# Patient Record
Sex: MALE | Race: WHITE | NOT HISPANIC OR LATINO | Employment: FULL TIME | URBAN - METROPOLITAN AREA
[De-identification: names, ages, dates, MRNs, and addresses within clinical notes are randomized per-mention and may not be internally consistent; named-entity substitution may affect disease eponyms.]

---

## 2017-03-07 ENCOUNTER — OFFICE VISIT (OUTPATIENT)
Dept: FAMILY MEDICINE | Facility: CLINIC | Age: 42
End: 2017-03-07
Payer: COMMERCIAL

## 2017-03-07 VITALS
HEIGHT: 73 IN | BODY MASS INDEX: 25.74 KG/M2 | TEMPERATURE: 98.4 F | WEIGHT: 194.25 LBS | DIASTOLIC BLOOD PRESSURE: 64 MMHG | OXYGEN SATURATION: 98 % | HEART RATE: 62 BPM | SYSTOLIC BLOOD PRESSURE: 116 MMHG

## 2017-03-07 DIAGNOSIS — G47.00 INSOMNIA, UNSPECIFIED: ICD-10-CM

## 2017-03-07 DIAGNOSIS — F10.21 ALCOHOL DEPENDENCE IN REMISSION (H): Primary | ICD-10-CM

## 2017-03-07 PROCEDURE — 36415 COLL VENOUS BLD VENIPUNCTURE: CPT | Performed by: FAMILY MEDICINE

## 2017-03-07 PROCEDURE — 80076 HEPATIC FUNCTION PANEL: CPT | Performed by: FAMILY MEDICINE

## 2017-03-07 PROCEDURE — 99214 OFFICE O/P EST MOD 30 MIN: CPT | Performed by: FAMILY MEDICINE

## 2017-03-07 RX ORDER — DISULFIRAM 250 MG/1
250 TABLET ORAL DAILY
Qty: 90 TABLET | Refills: 1 | Status: SHIPPED | OUTPATIENT
Start: 2017-03-07 | End: 2017-05-25

## 2017-03-07 RX ORDER — TEMAZEPAM 7.5 MG/1
7.5 CAPSULE ORAL
Qty: 30 CAPSULE | Refills: 2 | Status: SHIPPED | OUTPATIENT
Start: 2017-03-07 | End: 2021-07-12

## 2017-03-07 RX ORDER — TEMAZEPAM 15 MG/1
15 CAPSULE ORAL
Qty: 30 CAPSULE | Refills: 3 | Status: CANCELLED | OUTPATIENT
Start: 2017-03-07

## 2017-03-07 ASSESSMENT — ANXIETY QUESTIONNAIRES
6. BECOMING EASILY ANNOYED OR IRRITABLE: MORE THAN HALF THE DAYS
7. FEELING AFRAID AS IF SOMETHING AWFUL MIGHT HAPPEN: SEVERAL DAYS
5. BEING SO RESTLESS THAT IT IS HARD TO SIT STILL: NOT AT ALL
GAD7 TOTAL SCORE: 6
1. FEELING NERVOUS, ANXIOUS, OR ON EDGE: MORE THAN HALF THE DAYS
IF YOU CHECKED OFF ANY PROBLEMS ON THIS QUESTIONNAIRE, HOW DIFFICULT HAVE THESE PROBLEMS MADE IT FOR YOU TO DO YOUR WORK, TAKE CARE OF THINGS AT HOME, OR GET ALONG WITH OTHER PEOPLE: NOT DIFFICULT AT ALL
2. NOT BEING ABLE TO STOP OR CONTROL WORRYING: NOT AT ALL
3. WORRYING TOO MUCH ABOUT DIFFERENT THINGS: SEVERAL DAYS

## 2017-03-07 ASSESSMENT — PATIENT HEALTH QUESTIONNAIRE - PHQ9: 5. POOR APPETITE OR OVEREATING: NOT AT ALL

## 2017-03-07 NOTE — NURSING NOTE
"No chief complaint on file.      Initial /64 (Cuff Size: Adult Large)  Pulse 62  Temp 98.4  F (36.9  C) (Oral)  Ht 6' 1\" (1.854 m)  Wt 194 lb 4 oz (88.1 kg)  SpO2 98%  BMI 25.63 kg/m2 Estimated body mass index is 25.63 kg/(m^2) as calculated from the following:    Height as of this encounter: 6' 1\" (1.854 m).    Weight as of this encounter: 194 lb 4 oz (88.1 kg).  Medication Reconciliation: complete     Niharika Barry, CMA      "

## 2017-03-07 NOTE — MR AVS SNAPSHOT
After Visit Summary   3/7/2017    Jaun Lobo    MRN: [id number]           Patient Information     Date Of Birth          1975        Visit Information        Provider Department      3/7/2017 3:00 PM Parker Bush MD [facility]        Today's Diagnoses     Alcohol dependence in remission (HCC)    -  1    Insomnia, unspecified           Follow-ups after your visit        Additional Services     MENTAL HEALTH REFERRAL       Your provider has referred you to: [place] chemical dependency counseling [phone]     All scheduling is subject to the client's specific insurance plan & benefits, provider/location availability, and provider clinical specialities.  Please arrive 15 minutes early for your first appointment and bring your completed paperwork.    Please be aware that coverage of these services is subject to the terms and limitations of your health insurance plan.  Call member services at your health plan with any benefit or coverage questions.                  Who to contact     If you have questions or need follow up information about today's clinic visit or your schedule please contact [facility] directly at [phone].  Normal or non-critical lab and imaging results will be communicated to you by MyChart, letter or phone within 4 business days after the clinic has received the results. If you do not hear from us within 7 days, please contact the clinic through MyChart or phone. If you have a critical or abnormal lab result, we will notify you by phone as soon as possible.  Submit refill requests through [employer] or call your pharmacy and they will forward the refill request to us. Please allow 3 business days for your refill to be completed.          Additional Information About Your Visit        c-LEctahart Information     [employer] lets you send messages to your doctor, view your test results, renew your prescriptions, schedule appointments and  "more. To sign up, go to [email or web address] . Click on \"Log in\" on the left side of the screen, which will take you to the Welcome page. Then click on \"Sign up Now\" on the right side of the page.     You will be asked to enter the access code listed below, as well as some personal information. Please follow the directions to create your username and password.     Your access code is: [email or web address]  Expires: 2017  3:36 PM     Your access code will  in 90 days. If you need help or a new code, please call your [place] clinic or [phone].        Care EveryWhere ID     This is your Care EveryWhere ID. This could be used by other organizations to access your [place] medical records  [id number]        Your Vitals Were     Pulse Temperature Height Pulse Oximetry BMI (Body Mass Index)       62 98.4  F (36.9  C) (Oral) 6' 1\" (1.854 m) 98% 25.63 kg/m2        Blood Pressure from Last 3 Encounters:   17 116/64   16 124/70   07/28/15 106/70    Weight from Last 3 Encounters:   17 194 lb 4 oz (88.1 kg)   16 182 lb (82.6 kg)   07/28/15 185 lb (83.9 kg)              We Performed the Following     Hepatic panel (Albumin, ALT, AST, Bili, Alk Phos, TP)     MENTAL HEALTH REFERRAL          Today's Medication Changes          These changes are accurate as of: 3/7/17  3:37 PM.  If you have any questions, ask your nurse or doctor.               Start taking these medicines.        Dose/Directions    disulfiram 250 MG tablet   Commonly known as:  ANTABUSE   Used for:  Alcohol dependence in remission (H)   Started by:  Parker Bush MD        Dose:  250 mg   Take 1 tablet (250 mg) by mouth daily   Quantity:  90 tablet   Refills:  1         These medicines have changed or have updated prescriptions.        Dose/Directions    * temazepam 15 MG capsule   Commonly known as:  RESTORIL   This may have changed:  Another medication with the same name was added. Make sure you understand how and " when to take each.   Used for:  Insomnia, unspecified   Changed by:  Parker Bush MD        Dose:  15 mg   Take 1 capsule (15 mg) by mouth nightly as needed for sleep   Quantity:  30 capsule   Refills:  2       * temazepam 7.5 MG capsule   Commonly known as:  RESTORIL   This may have changed:  You were already taking a medication with the same name, and this prescription was added. Make sure you understand how and when to take each.   Used for:  Insomnia, unspecified   Changed by:  Parker Bush MD        Dose:  7.5 mg   Take 1 capsule (7.5 mg) by mouth nightly as needed for sleep   Quantity:  30 capsule   Refills:  2       * Notice:  This list has 2 medication(s) that are the same as other medications prescribed for you. Read the directions carefully, and ask your doctor or other care provider to review them with you.         Where to get your medicines      These medications were sent to [facility] [place] [address]     Phone:  [phone]     disulfiram 250 MG tablet         Some of these will need a paper prescription and others can be bought over the counter.  Ask your nurse if you have questions.     Bring a paper prescription for each of these medications     temazepam 7.5 MG capsule                Primary Care Provider Office Phone # Fax #    Parker Bush [phone]       [place] [phone]        Thank you!     Thank you for choosing [facility]  for your care. Our goal is always to provide you with excellent care. Hearing back from our patients is one way we can continue to improve our services. Please take a few minutes to complete the written survey that you may receive in the mail after your visit with us. Thank you!             Your Updated Medication List - Protect others around you: Learn how to safely use, store  and throw away your medicines at www.disposemymeds.org.          This list is accurate as of: 3/7/17  3:37 PM.  Always use your most recent med list.                   Brand Name Dispense Instructions for use    albuterol 108 (90 BASE) MCG/ACT Inhaler    PROAIR HFA/PROVENTIL HFA/VENTOLIN HFA     Inhale 2 puffs into the lungs every 4 hours as needed for shortness of breath / dyspnea or wheezing       disulfiram 250 MG tablet    ANTABUSE    90 tablet    Take 1 tablet (250 mg) by mouth daily       melatonin 3 MG tablet      Take 3 mg by mouth nightly as needed.       * NALTREXONE HCL PO      Take 50 mg by mouth Reported on 3/7/2017       * naltrexone 50 MG tablet    DEPADE;REVIA    90 tablet    Take 1 tablet (50 mg) by mouth daily       St Johns Wort 450 MG Caps     60    once per day       * temazepam 15 MG capsule    RESTORIL    30 capsule    Take 1 capsule (15 mg) by mouth nightly as needed for sleep       * temazepam 7.5 MG capsule    RESTORIL    30 capsule    Take 1 capsule (7.5 mg) by mouth nightly as needed for sleep       * Notice:  This list has 4 medication(s) that are the same as other medications prescribed for you. Read the directions carefully, and ask your doctor or other care provider to review them with you.

## 2017-03-07 NOTE — PROGRESS NOTES
SUBJECTIVE:                                                    Jaun Lobo is a 42 year old male who presents to clinic today for the following health issues:    Medication Followup of Naltrexone    Taking Medication as prescribed: NO due to side effects, las taken 1 month ago     Side Effects:  Extreme irritability and loss of sexual interest    Medication Helping Symptoms:  Yes    Additional: pt would like to try Campral      Medication Followup of Temazepam    Taking Medication as prescribed: yes    Side Effects:  None    Medication Helping Symptoms:  yes     Went to [facility] a year ago for alcohol use.   Used naltrexone. Helps him to not drink.   Side effects are bothering him - extreme irritability and loss of sexual drive.   Stopped taking it for several months - symptoms improved and started drinking again.   Does not want to drink, restarted meds and side effects are back.   Goes to [employer] meetings.     Problem list and histories reviewed & adjusted, as indicated.  Additional history: as documented    Labs reviewed in EPIC    Reviewed and updated as needed this visit by clinical staff       Reviewed and updated as needed this visit by Provider           Social History     Social History     Marital status: Single     Spouse name: N/A     Number of children: 0     Years of education: N/A     Occupational History       [employer]     Social History Main Topics     Smoking status: Never Smoker     Smokeless tobacco: Never Used     Alcohol use 10.0 oz/week     20 Cans of beer per week      Comment: 40 drinks a week      Drug use: No     Sexual activity: Yes     Partners: Female     Other Topics Concern     Parent/Sibling W/ Cabg, Mi Or Angioplasty Before 65f 55m? No     Social History Narrative    Balanced Diet - Yes    Osteoporosis Preventative measures-  Dairy servings per day: 2 and Weight bearing exercise    Regular Exercise -  Yes Describe Runs every AM for 15 minutes.    Dental Exam  "up - YES - Date: 05/2003    Seatbelts used - Yes    Self Testicular Exam -  Yes    Abuse: Current or Past (Physical, Sexual or Emotional)- No    Do you have any concerns about STD's -  No    Do you feel safe in your environment - Yes    Guns stored in the home - No    Sunscreen used - No    Lipids - YES - Date: Has been several years.    Glucose -  YES - Date: Has been several years.    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                         Allergies   Allergen Reactions     Animal Dander      Dust Mite Extract      No Known Drug Allergies      Ragweeds      goldenrod     Seasonal Allergies      Patient Active Problem List   Diagnosis     Allergic rhinitis     Anxiety state     Headache     Ankylosing spondylitis (H)     DDD (degenerative disc disease), lumbar     Low back pain     Herpes simplex esophagitis     CARDIOVASCULAR SCREENING; LDL GOAL LESS THAN 160     Insomnia     GERD (gastroesophageal reflux disease)     Esophagitis     Alcohol dependence in remission (HCC)     Reviewed medications, social history and  past medical and surgical history.    Review of system: for general, respiratory, CVS, GI and psychiatry negative except for noted above.     EXAM:  /64 (Cuff Size: Adult Large)  Pulse 62  Temp 98.4  F (36.9  C) (Oral)  Ht 6' 1\" (1.854 m)  Wt 194 lb 4 oz (88.1 kg)  SpO2 98%  BMI 25.63 kg/m2  Constitutional: healthy, alert and no distress   Psychiatric: mentation appears normal and affect normal/bright       ASSESSMENT / PLAN:  (F10.21) Alcohol dependence in remission (HCC)  (primary encounter diagnosis)  Comment: irritability with naltrexone. Will stop that. We talked about antabuse and I explained it may be less effective as many people just do not take it when they drink alcohol. I " recommended him to take it in the morning when he feels his will power is highest. We talked about unpleasant reaction with any alcohol product including foot and mouthwash containing alcohol. Baseline LFT today and every 6 months if baseline is normal. Also discussed seeing chemical dependency evaluator. He wishes to see them but wishes to find a counselor who does not use 12 steps as he finds it Quaker. Information for CD counselor given.   Plan: disulfiram (ANTABUSE) 250 MG tablet, Hepatic         panel (Albumin, ALT, AST, Bili, Alk Phos, TP),         MENTAL HEALTH REFERRAL             (G47.00) Insomnia, unspecified  Comment:  Felt 15mg was too much but 7.5 was not covered. Will try it again. Uses very infrequently.   Plan: temazepam (RESTORIL) 7.5 MG capsule           Follow up in 6 months if doing well.

## 2017-03-07 NOTE — LETTER
[facility]   [address] [place]   [address]  [phone]    March 9, 2017      Jaun Lobo  [address] [facility] [address]              Dear Mr. Lobo,    Your baseline liver enzymes are stable today.    Results for orders placed or performed in visit on 03/07/17   Hepatic panel (Albumin, ALT, AST, Bili, Alk Phos, TP)   Result Value Ref Range    Bilirubin Direct 0.2 0.0 - 0.2 mg/dL    Bilirubin Total 0.5 0.2 - 1.3 mg/dL    Albumin 4.0 3.4 - 5.0 g/dL    Protein Total 7.1 6.8 - 8.8 g/dL    Alkaline Phosphatase 56 40 - 150 U/L    ALT 23 0 - 70 U/L    AST 17 0 - 45 U/L           Sincerely,    Parker Bush MD/nr

## 2017-03-08 LAB
ALBUMIN SERPL-MCNC: 4 G/DL (ref 3.4–5)
ALP SERPL-CCNC: 56 U/L (ref 40–150)
ALT SERPL W P-5'-P-CCNC: 23 U/L (ref 0–70)
AST SERPL W P-5'-P-CCNC: 17 U/L (ref 0–45)
BILIRUB DIRECT SERPL-MCNC: 0.2 MG/DL (ref 0–0.2)
BILIRUB SERPL-MCNC: 0.5 MG/DL (ref 0.2–1.3)
PROT SERPL-MCNC: 7.1 G/DL (ref 6.8–8.8)

## 2017-03-08 ASSESSMENT — PATIENT HEALTH QUESTIONNAIRE - PHQ9: SUM OF ALL RESPONSES TO PHQ QUESTIONS 1-9: 13

## 2017-03-08 ASSESSMENT — ANXIETY QUESTIONNAIRES: GAD7 TOTAL SCORE: 6

## 2017-04-26 ENCOUNTER — OFFICE VISIT (OUTPATIENT)
Dept: FAMILY MEDICINE | Facility: CLINIC | Age: 42
End: 2017-04-26
Payer: COMMERCIAL

## 2017-04-26 VITALS
HEIGHT: 73 IN | HEART RATE: 62 BPM | DIASTOLIC BLOOD PRESSURE: 82 MMHG | WEIGHT: 184.5 LBS | OXYGEN SATURATION: 98 % | BODY MASS INDEX: 24.45 KG/M2 | TEMPERATURE: 98.5 F | SYSTOLIC BLOOD PRESSURE: 132 MMHG

## 2017-04-26 DIAGNOSIS — Z11.3 SCREENING FOR STDS (SEXUALLY TRANSMITTED DISEASES): ICD-10-CM

## 2017-04-26 DIAGNOSIS — Z13.220 SCREENING FOR HYPERLIPIDEMIA: ICD-10-CM

## 2017-04-26 DIAGNOSIS — Z00.00 ROUTINE GENERAL MEDICAL EXAMINATION AT A HEALTH CARE FACILITY: Primary | ICD-10-CM

## 2017-04-26 DIAGNOSIS — F10.21 ALCOHOL DEPENDENCE IN REMISSION (H): ICD-10-CM

## 2017-04-26 DIAGNOSIS — Z13.1 SCREENING FOR DIABETES MELLITUS: ICD-10-CM

## 2017-04-26 PROCEDURE — 87389 HIV-1 AG W/HIV-1&-2 AB AG IA: CPT | Performed by: FAMILY MEDICINE

## 2017-04-26 PROCEDURE — 80053 COMPREHEN METABOLIC PANEL: CPT | Performed by: FAMILY MEDICINE

## 2017-04-26 PROCEDURE — 86803 HEPATITIS C AB TEST: CPT | Performed by: FAMILY MEDICINE

## 2017-04-26 PROCEDURE — 36415 COLL VENOUS BLD VENIPUNCTURE: CPT | Performed by: FAMILY MEDICINE

## 2017-04-26 PROCEDURE — 99396 PREV VISIT EST AGE 40-64: CPT | Performed by: FAMILY MEDICINE

## 2017-04-26 PROCEDURE — 80061 LIPID PANEL: CPT | Performed by: FAMILY MEDICINE

## 2017-04-26 PROCEDURE — 87591 N.GONORRHOEAE DNA AMP PROB: CPT | Performed by: FAMILY MEDICINE

## 2017-04-26 PROCEDURE — 86780 TREPONEMA PALLIDUM: CPT | Performed by: FAMILY MEDICINE

## 2017-04-26 PROCEDURE — 87491 CHLMYD TRACH DNA AMP PROBE: CPT | Performed by: FAMILY MEDICINE

## 2017-04-26 NOTE — LETTER
[facility]   [address] [place]   [address]  [phone]    April 28, 2017      Jaun Lobo  [address] [facility] [address]              Dear Mr. Lobo,    Your liver enzymes are high and it is abnormal. It was normal a month ago.   We should hold off on antabuse and should recheck liver enzymes again in a month to make sure it is going back to normal  All of your STD tests are fine.  Your kidney function is fine.    Your good cholesterol (HDL) is low, which improves with exercise. Remaining cholesterol panel is fine.     Results for orders placed or performed in visit on 04/26/17   LIPID REFLEX TO DIRECT LDL PANEL   Result Value Ref Range    Cholesterol 151 <200 mg/dL    Triglycerides 122 <150 mg/dL    HDL Cholesterol 38 (L) >39 mg/dL    LDL Cholesterol Calculated 89 <100 mg/dL    Non HDL Cholesterol 113 <130 mg/dL   Comprehensive metabolic panel   Result Value Ref Range    Sodium 138 133 - 144 mmol/L    Potassium 4.4 3.4 - 5.3 mmol/L    Chloride 105 94 - 109 mmol/L    Carbon Dioxide 24 20 - 32 mmol/L    Anion Gap 9 3 - 14 mmol/L    Glucose 90 70 - 99 mg/dL    Urea Nitrogen 13 7 - 30 mg/dL    Creatinine 0.90 0.66 - 1.25 mg/dL    GFR Estimate >90  Non  GFR Calc   >60 mL/min/1.7m2    GFR Estimate If Black >90   GFR Calc   >60 mL/min/1.7m2    Calcium 8.7 8.5 - 10.1 mg/dL    Bilirubin Total 0.9 0.2 - 1.3 mg/dL    Albumin 4.1 3.4 - 5.0 g/dL    Protein Total 6.9 6.8 - 8.8 g/dL    Alkaline Phosphatase 64 40 - 150 U/L     (H) 0 - 70 U/L    AST 69 (H) 0 - 45 U/L   HIV Antigen Antibody Combo   Result Value Ref Range    HIV Antigen Antibody Combo  NR     Nonreactive   HIV-1 p24 Ag & HIV-1/HIV-2 Ab Not Detected     Anti Treponema   Result Value Ref Range    Treponema pallidum Antibody Negative NEG   Hepatitis C Screen Reflex to HCV RNA Quant and Genotype   Result Value Ref Range    Hepatitis C Antibody  NR     Nonreactive   Assay performance  characteristics have not been established for newborns,   infants, and children     NEISSERIA GONORRHOEA PCR   Result Value Ref Range    Specimen Descrip Urine     N Gonorrhea PCR  NEG     Negative   Negative for N. gonorrhoeae rRNA by transcription mediated amplification.   A negative result by transcription mediated amplification does not preclude the   presence of N. gonorrhoeae infection because results are dependent on proper   and adequate collection, absence of inhibitors, and sufficient rRNA to be   detected.     CHLAMYDIA TRACHOMATIS PCR   Result Value Ref Range    Specimen Description Urine     Chlamydia Trachomatis PCR  NEG     Negative   Negative for C. trachomatis rRNA by transcription mediated amplification.   A negative result by transcription mediated amplification does not preclude the   presence of C. trachomatis infection because results are dependent on proper   and adequate collection, absence of inhibitors, and sufficient rRNA to be   detected.             Sincerely,    Parker Bush MD/nr

## 2017-04-26 NOTE — NURSING NOTE
"Chief Complaint   Patient presents with     Physical       Initial /82 (Cuff Size: Adult Regular)  Pulse 62  Temp 98.5  F (36.9  C) (Oral)  Ht 6' 1\" (1.854 m)  Wt 184 lb 8 oz (83.7 kg)  SpO2 98%  BMI 24.34 kg/m2 Estimated body mass index is 24.34 kg/(m^2) as calculated from the following:    Height as of this encounter: 6' 1\" (1.854 m).    Weight as of this encounter: 184 lb 8 oz (83.7 kg).  Medication Reconciliation: complete     Niharika Barry, PANCHO      "

## 2017-04-26 NOTE — MR AVS SNAPSHOT
After Visit Summary   4/26/2017    Jaun Lobo    MRN: [id number]           Patient Information     Date Of Birth          1975        Visit Information        Provider Department      4/26/2017 3:00 PM Parker Bush MD [facility]        Today's Diagnoses     Routine general medical examination at a health care facility    -  1    Alcohol dependence in remission (HCC)        Screening for diabetes mellitus        Screening for hyperlipidemia        Screening for STDs (sexually transmitted diseases)          Care Instructions      Preventive Health Recommendations  Male Ages 40 to 49    Yearly exam:             See your health care provider every year in order to  o   Review health changes.   o   Discuss preventive care.    o   Review your medicines if your doctor has prescribed any.    You should be tested each year for STDs (sexually transmitted diseases) if you re at risk.     Have a cholesterol test every 5 years.     Have a colonoscopy (test for colon cancer) if someone in your family has had colon cancer or polyps before age 50.     After age 45, have a diabetes test (fasting glucose). If you are at risk for diabetes, you should have this test every 3 years.      Talk with your health care provider about whether or not a prostate cancer screening test (PSA) is right for you.    Shots: Get a flu shot each year. Get a tetanus shot every 10 years.     Nutrition:    Eat at least 5 servings of fruits and vegetables daily.     Eat whole-grain bread, whole-wheat pasta and brown rice instead of white grains and rice.     Talk to your provider about Calcium and Vitamin D.     Lifestyle    Exercise for at least 150 minutes a week (30 minutes a day, 5 days a week). This will help you control your weight and prevent disease.     Limit alcohol to one drink per day.     No smoking.     Wear sunscreen to prevent skin cancer.     See your dentist every six months for an exam  "and cleaning.            Follow-ups after your visit        Who to contact     If you have questions or need follow up information about today's clinic visit or your schedule please contact [facility] directly at [phone].  Normal or non-critical lab and imaging results will be communicated to you by MyChart, letter or phone within 4 business days after the clinic has received the results. If you do not hear from us within 7 days, please contact the clinic through MyChart or phone. If you have a critical or abnormal lab result, we will notify you by phone as soon as possible.  Submit refill requests through [employer] or call your pharmacy and they will forward the refill request to us. Please allow 3 business days for your refill to be completed.          Additional Information About Your Visit        [employer] Information     [employer]k lets you send messages to your doctor, view your test results, renew your prescriptions, schedule appointments and more. To sign up, go to [email or web address] . Click on \"Log in\" on the left side of the screen, which will take you to the Welcome page. Then click on \"Sign up Now\" on the right side of the page.     You will be asked to enter the access code listed below, as well as some personal information. Please follow the directions to create your username and password.     Your access code is: [email or web address]  Expires: 2017  4:36 PM     Your access code will  in 90 days. If you need help or a new code, please call your [place] clinic or [phone].        Care EveryWhere ID     This is your Care EveryWhere ID. This could be used by other organizations to access your [place] medical records  [id number]        Your Vitals Were     Pulse Temperature Height Pulse Oximetry BMI (Body Mass Index)       62 98.5  F (36.9  C) (Oral) 6' 1\" (1.854 m) 98% 24.34 kg/m2        Blood Pressure from Last 3 Encounters:   17 132/82   17 116/64   16 " 124/70    Weight from Last 3 Encounters:   04/26/17 184 lb 8 oz (83.7 kg)   03/07/17 194 lb 4 oz (88.1 kg)   04/29/16 182 lb (82.6 kg)              We Performed the Following     Anti Treponema     CHLAMYDIA TRACHOMATIS PCR     Comprehensive metabolic panel     Hepatitis C Screen Reflex to HCV RNA Quant and Genotype     HIV Antigen Antibody Combo     LIPID REFLEX TO DIRECT LDL PANEL     NEISSERIA GONORRHOEA PCR          Today's Medication Changes          These changes are accurate as of: 4/26/17  3:32 PM.  If you have any questions, ask your nurse or doctor.               These medicines have changed or have updated prescriptions.        Dose/Directions    temazepam 7.5 MG capsule   Commonly known as:  RESTORIL   This may have changed:  Another medication with the same name was removed. Continue taking this medication, and follow the directions you see here.   Used for:  Insomnia, unspecified   Changed by:  Parker Bush MD        Dose:  7.5 mg   Take 1 capsule (7.5 mg) by mouth nightly as needed for sleep   Quantity:  30 capsule   Refills:  2         Stop taking these medicines if you haven't already. Please contact your care team if you have questions.     naltrexone 50 MG tablet   Commonly known as:  DEPADE;REVIA   Stopped by:  Parker Bush MD           NALTREXONE HCL PO   Stopped by:  Parker Bush MD                    Primary Care Provider Office Phone # Fax #    Parker Bush [phone]       [address] [address]        Thank you!     Thank you for choosing [facility]  for your care. Our goal is always to provide you with excellent care. Hearing back from our patients is one way we can continue to improve our services. Please take a few minutes to complete the written survey that you may receive in the mail after your visit with us. Thank you!             Your Updated Medication List -  Protect others around you: Learn how to safely use, store and throw away your medicines at www.disposemymeds.org.          This list is accurate as of: 4/26/17  3:32 PM.  Always use your most recent med list.                   Brand Name Dispense Instructions for use    albuterol 108 (90 BASE) MCG/ACT Inhaler    PROAIR HFA/PROVENTIL HFA/VENTOLIN HFA     Inhale 2 puffs into the lungs every 4 hours as needed for shortness of breath / dyspnea or wheezing       disulfiram 250 MG tablet    ANTABUSE    90 tablet    Take 1 tablet (250 mg) by mouth daily       melatonin 3 MG tablet      Take 3 mg by mouth nightly as needed.       St Johns Wort 450 MG Caps     60    once per day       temazepam 7.5 MG capsule    RESTORIL    30 capsule    Take 1 capsule (7.5 mg) by mouth nightly as needed for sleep

## 2017-04-26 NOTE — PROGRESS NOTES
SUBJECTIVE:     CC: Jaun Lobo is an 42 year old male who presents for preventative health visit.     Healthy Habits:    Do you get at least three servings of calcium containing foods daily (dairy, green leafy vegetables, etc.)? yes    Amount of exercise or daily activities, outside of work: 5-6 day(s) per week    Problems taking medications regularly No    Medication side effects: No    Have you had an eye exam in the past two years? yes    Do you see a dentist twice per year? yes  Do you have sleep apnea, excessive snoring or daytime drowsiness?no    Today's PHQ-2 Score:   PHQ-2 ( 1999 [employer]) 4/26/2017 3/7/2017   Q1: Little interest or pleasure in doing things 0 3   Q2: Feeling down, depressed or hopeless 0 3   PHQ-2 Score 0 6       Abuse: Current or Past(Physical, Sexual or Emotional)- No  Do you feel safe in your environment - Yes    Social History   Substance Use Topics     Smoking status: Never Smoker     Smokeless tobacco: Never Used     Alcohol use No      Comment: 40 drinks a week , quit drinking 6 weeks ago      The patient does not drink >3 drinks per day nor >7 drinks per week.    Last PSA: No results found for: PSA    Recent Labs   Lab Test  02/18/14   1022  12/04/12   1047   CHOL  154  144   HDL  61  58   LDL  83  69   TRIG  51  83   CHOLHDLRATIO  2.5  2.5       Reviewed orders with patient. Reviewed health maintenance and updated orders accordingly - Yes    Reviewed and updated as needed this visit by clinical staff  Tobacco  Allergies  Meds  Med Hx  Surg Hx  Fam Hx  Soc Hx      Reviewed and updated as needed this visit by Provider    Using antabuse. Feels it is helping a lot. Not drinking alcohol. Happy that it does not have naltrexone side effects. Currently using 1/2 the dose due to garlic taste in mouth.     ROS:  C: NEGATIVE for fever, chills, change in weight  I: NEGATIVE for worrisome rashes, moles or lesions  E: NEGATIVE for vision changes or irritation  ENT: NEGATIVE for ear,  "mouth and throat problems  R: NEGATIVE for significant cough or SOB  CV: NEGATIVE for chest pain, palpitations or peripheral edema  GI: NEGATIVE for nausea, abdominal pain, heartburn, or change in bowel habits   male: negative for dysuria, hematuria, decreased urinary stream, erectile dysfunction, urethral discharge  M: NEGATIVE for significant arthralgias or myalgia  N: NEGATIVE for weakness, dizziness or paresthesias  E: NEGATIVE for temperature intolerance, skin/hair changes  P: NEGATIVE for changes in mood or affect    Problem list, Medication list, Allergies, and Medical/Social/Surgical histories reviewed in [facility] and updated as appropriate.  OBJECTIVE:     /82 (Cuff Size: Adult Regular)  Pulse 62  Temp 98.5  F (36.9  C) (Oral)  Ht 6' 1\" (1.854 m)  Wt 184 lb 8 oz (83.7 kg)  SpO2 98%  BMI 24.34 kg/m2  EXAM:  GENERAL: healthy, alert and no distress  EYES: Eyes grossly normal to inspection, PERRL and conjunctivae and sclerae normal  HENT: ear canals and TM's normal, nose and mouth without ulcers or lesions  NECK: no adenopathy, no asymmetry, masses, or scars and thyroid normal to palpation  RESP: lungs clear to auscultation - no rales, rhonchi or wheezes  CV: regular rate and rhythm, normal S1 S2, no S3 or S4, no murmur, click or rub, no peripheral edema and peripheral pulses strong  ABDOMEN: soft, nontender, no hepatosplenomegaly, no masses and bowel sounds normal   (male): normal male genitalia without lesions or urethral discharge, no hernia  MS: no gross musculoskeletal defects noted, no edema  SKIN: no suspicious lesions or rashes  NEURO: Normal strength and tone, mentation intact and speech normal  PSYCH: mentation appears normal, affect normal/bright    ASSESSMENT/PLAN:     (Z00.00) Routine general medical examination at a health care facility  (primary encounter diagnosis)  Comment:    Plan:      (F10.21) Alcohol dependence in remission (HCC)  Comment: doing really well with antabuse. Did " "not tolerate naltrexone. Recheck LFTs today.   Plan: Comprehensive metabolic panel             (Z13.1) Screening for diabetes mellitus  Comment:    Plan: Comprehensive metabolic panel             (Z13.220) Screening for hyperlipidemia  Comment:    Plan: LIPID REFLEX TO DIRECT LDL PANEL             (Z11.3) Screening for STDs (sexually transmitted diseases)  Comment: immune to hep b.  Plan: NEISSERIA GONORRHOEA PCR, CHLAMYDIA TRACHOMATIS        PCR, HIV Antigen Antibody Combo, Anti         Treponema, Hepatitis C Screen Reflex to HCV RNA        Quant and Genotype           COUNSELING:  Reviewed preventive health counseling, as reflected in patient instructions  Special attention given to:        Regular exercise       Healthy diet/nutrition       Vision screening       Hearing screening       Alcohol Use         reports that he has never smoked. He has never used smokeless tobacco.    Estimated body mass index is 24.34 kg/(m^2) as calculated from the following:    Height as of this encounter: 6' 1\" (1.854 m).    Weight as of this encounter: 184 lb 8 oz (83.7 kg).       Counseling Resources:  ATP IV Guidelines  Pooled Cohorts Equation Calculator  FRAX Risk Assessment  ICSI Preventive Guidelines  Dietary Guidelines for Americans, 2010  USDA's MyPlate  ASA Prophylaxis  Lung CA Screening    Parker Bush MD  [facility]         "

## 2017-04-27 LAB
ALBUMIN SERPL-MCNC: 4.1 G/DL (ref 3.4–5)
ALP SERPL-CCNC: 64 U/L (ref 40–150)
ALT SERPL W P-5'-P-CCNC: 164 U/L (ref 0–70)
ANION GAP SERPL CALCULATED.3IONS-SCNC: 9 MMOL/L (ref 3–14)
AST SERPL W P-5'-P-CCNC: 69 U/L (ref 0–45)
BILIRUB SERPL-MCNC: 0.9 MG/DL (ref 0.2–1.3)
BUN SERPL-MCNC: 13 MG/DL (ref 7–30)
C TRACH DNA SPEC QL NAA+PROBE: NORMAL
CALCIUM SERPL-MCNC: 8.7 MG/DL (ref 8.5–10.1)
CHLORIDE SERPL-SCNC: 105 MMOL/L (ref 94–109)
CHOLEST SERPL-MCNC: 151 MG/DL
CO2 SERPL-SCNC: 24 MMOL/L (ref 20–32)
CREAT SERPL-MCNC: 0.9 MG/DL (ref 0.66–1.25)
GFR SERPL CREATININE-BSD FRML MDRD: ABNORMAL ML/MIN/1.7M2
GLUCOSE SERPL-MCNC: 90 MG/DL (ref 70–99)
HCV AB SERPL QL IA: NORMAL
HDLC SERPL-MCNC: 38 MG/DL
HIV 1+2 AB+HIV1 P24 AG SERPL QL IA: NORMAL
LDLC SERPL CALC-MCNC: 89 MG/DL
N GONORRHOEA DNA SPEC QL NAA+PROBE: NORMAL
NONHDLC SERPL-MCNC: 113 MG/DL
POTASSIUM SERPL-SCNC: 4.4 MMOL/L (ref 3.4–5.3)
PROT SERPL-MCNC: 6.9 G/DL (ref 6.8–8.8)
SODIUM SERPL-SCNC: 138 MMOL/L (ref 133–144)
SPECIMEN SOURCE: NORMAL
SPECIMEN SOURCE: NORMAL
T PALLIDUM IGG+IGM SER QL: NEGATIVE
TRIGL SERPL-MCNC: 122 MG/DL

## 2017-05-10 ENCOUNTER — TELEPHONE (OUTPATIENT)
Dept: FAMILY MEDICINE | Facility: CLINIC | Age: 42
End: 2017-05-10

## 2017-05-10 DIAGNOSIS — R79.89 ELEVATED LFTS: Primary | ICD-10-CM

## 2017-05-10 NOTE — TELEPHONE ENCOUNTER
The patient was advised by Dr. Bush  to stop taking antiabuse (see letter dated 4/28/17).  The patient has additional questions that he would like to ask.  Please follow up with the patient, okay to leave a message.

## 2017-05-10 NOTE — TELEPHONE ENCOUNTER
LVM regarding exchange below. Patient updated to continue with 1/4 pill daily of antabuse. Patient instructed to come in for hepatic panel by the end of the month to see if enzymes are still elevated. Patient also instructed on plan if enzymes are not at baseline. Patient to call back if any questions.     Thanks! Vonnie Tiwari RN

## 2017-05-10 NOTE — TELEPHONE ENCOUNTER
OK. Signed.  If still not at baseline - he should stop antabuse and should see addiction specialist to see if they have any other recommendations.

## 2017-05-10 NOTE — TELEPHONE ENCOUNTER
Dr. Bush,    Please advise  Patient questions/concerns are as follows:    1. Patient would like to reduce amt of Antabuse. Patient does not feel ready to stop yet. Patient has reduced dose from 1/2 a pill daily to a 1/4 pill daily. Patient reduced Antabuse on 4/28.    2. Patient has been drinking Kava stress relief tea which patient  states can have negative impact on liver. Patient has since stopped.    3. Patient is wondering if he can get another Liver enzyme test to see if improved.     Thanks! Vonnie Tiwari RN

## 2017-05-24 ENCOUNTER — TELEPHONE (OUTPATIENT)
Dept: FAMILY MEDICINE | Facility: CLINIC | Age: 42
End: 2017-05-24

## 2017-05-24 DIAGNOSIS — R79.89 ELEVATED LFTS: ICD-10-CM

## 2017-05-24 PROCEDURE — 36415 COLL VENOUS BLD VENIPUNCTURE: CPT | Performed by: FAMILY MEDICINE

## 2017-05-24 PROCEDURE — 80076 HEPATIC FUNCTION PANEL: CPT | Performed by: FAMILY MEDICINE

## 2017-05-24 NOTE — TELEPHONE ENCOUNTER
Pt was waiting to hear from Dr Bush about his antabuse and wondering about getting labs as he made an appt today for labwork but wasn't sure if that is what he was suppose to do. Apparently he never listened to voicemail that ANDRES Shankar left for him 5/10/17.  (*see phone encounter dated 5/10/17)    Future lab order has been placed and pt will plan on coming in today for his labwork.    Abby Escobar, RN, BSN

## 2017-05-25 ENCOUNTER — APPOINTMENT (OUTPATIENT)
Dept: ULTRASOUND IMAGING | Facility: CLINIC | Age: 42
End: 2017-05-25
Attending: FAMILY MEDICINE
Payer: COMMERCIAL

## 2017-05-25 ENCOUNTER — HOSPITAL ENCOUNTER (EMERGENCY)
Facility: CLINIC | Age: 42
Discharge: HOME | End: 2017-05-25
Attending: FAMILY MEDICINE | Admitting: FAMILY MEDICINE
Payer: COMMERCIAL

## 2017-05-25 ENCOUNTER — TELEPHONE (OUTPATIENT)
Dept: FAMILY MEDICINE | Facility: CLINIC | Age: 42
End: 2017-05-25

## 2017-05-25 VITALS
TEMPERATURE: 98 F | SYSTOLIC BLOOD PRESSURE: 137 MMHG | BODY MASS INDEX: 24.73 KG/M2 | WEIGHT: 187.44 LBS | DIASTOLIC BLOOD PRESSURE: 87 MMHG | RESPIRATION RATE: 16 BRPM | OXYGEN SATURATION: 100 % | HEART RATE: 75 BPM

## 2017-05-25 DIAGNOSIS — K75.9 HEPATITIS: ICD-10-CM

## 2017-05-25 DIAGNOSIS — R79.89 ELEVATED LFTS: Primary | ICD-10-CM

## 2017-05-25 LAB
ALBUMIN SERPL-MCNC: 4.1 G/DL (ref 3.4–5)
ALBUMIN SERPL-MCNC: 4.3 G/DL (ref 3.4–5)
ALP SERPL-CCNC: 76 U/L (ref 40–150)
ALP SERPL-CCNC: 82 U/L (ref 40–150)
ALT SERPL W P-5'-P-CCNC: 637 U/L (ref 0–70)
ALT SERPL W P-5'-P-CCNC: 701 U/L (ref 0–70)
AST SERPL W P-5'-P-CCNC: 222 U/L (ref 0–45)
AST SERPL W P-5'-P-CCNC: 252 U/L (ref 0–45)
BILIRUB DIRECT SERPL-MCNC: 0.2 MG/DL (ref 0–0.2)
BILIRUB DIRECT SERPL-MCNC: 0.3 MG/DL (ref 0–0.2)
BILIRUB SERPL-MCNC: 0.9 MG/DL (ref 0.2–1.3)
BILIRUB SERPL-MCNC: 1.2 MG/DL (ref 0.2–1.3)
PROT SERPL-MCNC: 6.9 G/DL (ref 6.8–8.8)
PROT SERPL-MCNC: 7.5 G/DL (ref 6.8–8.8)

## 2017-05-25 PROCEDURE — 87340 HEPATITIS B SURFACE AG IA: CPT | Performed by: FAMILY MEDICINE

## 2017-05-25 PROCEDURE — 86706 HEP B SURFACE ANTIBODY: CPT | Performed by: FAMILY MEDICINE

## 2017-05-25 PROCEDURE — 86704 HEP B CORE ANTIBODY TOTAL: CPT | Performed by: FAMILY MEDICINE

## 2017-05-25 PROCEDURE — 99283 EMERGENCY DEPT VISIT LOW MDM: CPT | Mod: Z6 | Performed by: FAMILY MEDICINE

## 2017-05-25 PROCEDURE — 86709 HEPATITIS A IGM ANTIBODY: CPT | Performed by: FAMILY MEDICINE

## 2017-05-25 PROCEDURE — 80076 HEPATIC FUNCTION PANEL: CPT | Performed by: FAMILY MEDICINE

## 2017-05-25 PROCEDURE — 76705 ECHO EXAM OF ABDOMEN: CPT

## 2017-05-25 PROCEDURE — 99284 EMERGENCY DEPT VISIT MOD MDM: CPT | Mod: 25 | Performed by: FAMILY MEDICINE

## 2017-05-25 PROCEDURE — 86803 HEPATITIS C AB TEST: CPT | Performed by: FAMILY MEDICINE

## 2017-05-25 ASSESSMENT — ENCOUNTER SYMPTOMS
SHORTNESS OF BREATH: 0
FEVER: 0
ABDOMINAL PAIN: 0

## 2017-05-25 NOTE — ED AVS SNAPSHOT
[facility], [place], Emergency Department    [address]    [facility] [address]    Phone:  [phone]    Fax:  [phone]                                       Jaun Lobo   MRN: [id number]    Department:  [facility], Emergency Department   Date of Visit:  5/25/2017           After Visit Summary Signature Page     I have received my discharge instructions, and my questions have been answered. I have discussed any challenges I see with this plan with the nurse or doctor.    ..........................................................................................................................................  Patient/Patient Representative Signature      ..........................................................................................................................................  Patient Representative Print Name and Relationship to Patient    ..................................................               ................................................  Date                                            Time    ..........................................................................................................................................  Reviewed by Signature/Title    ...................................................              ..............................................  Date                                                            Time

## 2017-05-25 NOTE — TELEPHONE ENCOUNTER
Due to sudden rise in LFTs - stop antabuse completely and should go to ER to rule out other etiology  I do not think he should wait for his office visit

## 2017-05-25 NOTE — TELEPHONE ENCOUNTER
Called both home and work numbers, left message to call back and ask to speak with an available triage nurse.  NEELAM SolorioN, RN

## 2017-05-25 NOTE — ED AVS SNAPSHOT
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        Discharge Instructions       NO ALCOHOL  NO ANTABUSE  NO TYLENOL  AVOID ALL OVER THE COUNTER MEDICATIONS  FOLLOW UP WITH YOUR DOCTOR ON Monday  IF YOUR LIVER TESTS DO NOT IMPROVE HE MAY SEND YOU TO THE [facility]- YOUR CLINIC CAN ASSIST YOU IN MAKING THE APPT IF NEEDED    Future Appointments        Provider Department Dept Phone Center    5/31/2017 8:00 AM [facility] [phone]     6/1/2017 7:40 AM Tonia Jolly PA-C [facility] [phone]       24 Hour Appointment Hotline       To make an appointment at any [facility], call [phone] ([phone]). If you don't have a family doctor or clinic, we will help you find one. [facility] are conveniently located to serve the needs of you and your family.             Review of your medicines      Our records show that you are taking the medicines listed below. If these are incorrect, please call your family doctor or clinic.        Dose / Directions Last dose taken    albuterol 108 (90 BASE) MCG/ACT Inhaler   Commonly known as:  PROAIR HFA/PROVENTIL HFA/VENTOLIN HFA   Dose:  2 puff        Inhale 2 puffs into the lungs every 4 hours as needed for shortness of breath / dyspnea or wheezing   Refills:  0        melatonin 3 MG tablet   Dose:  3 mg        Take 3 mg by mouth nightly as needed.   Refills:  0        St Johns Wort 450 MG Caps   Quantity:  60        once per day   Refills:  0        temazepam 7.5 MG  "capsule   Commonly known as:  RESTORIL   Dose:  7.5 mg   Quantity:  30 capsule        Take 1 capsule (7.5 mg) by mouth nightly as needed for sleep   Refills:  2                Procedures and tests performed during your visit     Abdomen US, limited (RUQ only)    Hepatic panel    Hepatitis A antibody IgM    Hepatitis B core antibody    Hepatitis B surface angeles immune s    Hepatitis B surface antigen    Hepatitis C antibody      Orders Needing Specimen Collection     None      Pending Results     Date and Time Order Name Status Description    5/25/2017 2025 Hepatitis B core antibody In process     5/25/2017 2006 Hepatitis C antibody In process     5/25/2017 2006 Hepatitis B surface angeles immune s In process     5/25/2017 2006 Hepatitis A antibody IgM In process     5/25/2017 2006 Hepatitis B surface antigen In process             Pending Culture Results     No orders found from 5/23/2017 to 5/26/2017.            Pending Results Instructions     If you had any lab results that were not finalized at the time of your Discharge, you can call the ED Lab Result RN at [phone]. You will be contacted by this team for any positive Lab results or changes in treatment. The nurses are available 7 days a week from 10A to 6:30P.  You can leave a message 24 hours per day and they will return your call.        Thank you for choosing [facility]       Thank you for choosing [facility] for your care. Our goal is always to provide you with excellent care. Hearing back from our patients is one way we can continue to improve our services. Please take a few minutes to complete the written survey that you may receive in the mail after you visit with us. Thank you!        [employer] Information     [employer] lets you send messages to your doctor, view your test results, renew your prescriptions, schedule appointments and more. To sign up, go to [email or web address] . Click on \"Log in\" on the left side of the screen, which will take you to the " "Welcome page. Then click on \"Sign up Now\" on the right side of the page.     You will be asked to enter the access code listed below, as well as some personal information. Please follow the directions to create your username and password.     Your access code is: [email or web address]  Expires: 2017  4:36 PM     Your access code will  in 90 days. If you need help or a new code, please call your [facility] clinic or [phone].        Care EveryWhere ID     This is your Care EveryWhere ID. This could be used by other organizations to access your [facility] medical records  [id number]        After Visit Summary       This is your record. Keep this with you and show to your community pharmacist(s) and doctor(s) at your next visit.                  "

## 2017-05-25 NOTE — TELEPHONE ENCOUNTER
Patient called back and was informed of message per below from SOULEYMANE Borrego CNP.    Patient verbalized understanding and in agreement with plan.    Patient denied abdominal pain, stated [place] is leaving out [place] on [place]17 so would need follow up appt on 6/1/17, early AM and lab only on 5/31/17 because he will not be back in town until late evening of 5/30/17.    Lab only scheduled on 5/31/17 and follow up appt scheduled with YNES Jolly PA-C, on 5/31/17.  Appt dates, times and location confirmed with patient.    Patient asked if elevated liver enzymes could be caused by something other than medication and if this will cause long lasting liver issues/damage.    Writer huddled with SOULEYMAEN Borrego CNP, who stated:  1. Concern with these elevated liver enzymes would be gallbladder disease, which is why ER evaluation recommended if experiencing abdominal pain  2. Unlikely to cause long term liver damage   A. Liver is an organ that can regenerate, so highly likely enzymes will normalize    Left message to call back and ask to speak with an available triage nurse.    Patient called back and was informed of SOULEYMANE Borrego's message per above.    Patient verbalized understanding, questioned if he should be going out of the country and if there is a way to find out if he does, in fact, have gall bladder disease.    Writer informed patient questions would best be addressed by a provider and offered to schedule patient appt in clinic tomorrow.  Patient declined and stated he will keep appointments as is.      RONALDO Sanchez, NEELAMN, RN

## 2017-05-25 NOTE — TELEPHONE ENCOUNTER
Called patient and reviewed message per below from Dr. Bush.    Patient verbalized understanding and in agreement with plan.    Patient asked which ER he should go to and what he should say upon arrival.  Is he going to be admitted and what will be done in ER?    Writer reviewed closest ER to clinic is [facility] and reviewed location.    Informed patent he can inform ER staff he was instructed to present to ER due to elevated liver enzymes and reassured patient ER will be able to locate lab work in his chart.    ER evaluation will determine what recommendation is: ER discharge or admission.    Informed patient likely additional lab work and possibly imaging studies such as ultrasound, MRI or CT scan will be performed.    Asked patient if there was anyone with him who could accompany to ER.  Patient stated there was not.    Writer asked if there is a family member or friend he would like writer to call to meet him at ER.  Patient stated no, but if he is in ER a while he has friends he can reach out to.    Patient stated he will arrive to ER in probably a half hour.    NEELAM SolorioN, RN

## 2017-05-25 NOTE — TELEPHONE ENCOUNTER
Please call pt to notify him liver enzymes are very elevated.  Recommend stop antabuse immediately.  If pt is having any abd pain he needs to go to ER for evaluation.  Please follow up in 1 week for lab check and schedule follow up with Dr. butler 2-7 days after lab check.    Taisha Borrego, CNP

## 2017-05-26 ENCOUNTER — TELEPHONE (OUTPATIENT)
Dept: FAMILY MEDICINE | Facility: CLINIC | Age: 42
End: 2017-05-26

## 2017-05-26 LAB
HAV IGM SERPL QL IA: NORMAL
HBV CORE AB SERPL QL IA: NONREACTIVE
HBV SURFACE AB SERPL IA-ACNC: 64.44 M[IU]/ML
HBV SURFACE AG SERPL QL IA: NONREACTIVE
HCV AB SERPL QL IA: NORMAL

## 2017-05-26 NOTE — DISCHARGE INSTRUCTIONS
NO ALCOHOL  NO ANTABUSE  NO TYLENOL  AVOID ALL OVER THE COUNTER MEDICATIONS  FOLLOW UP WITH YOUR DOCTOR ON Monday  IF YOUR LIVER TESTS DO NOT IMPROVE HE MAY SEND YOU TO THE [place] [facility]- YOUR CLINIC CAN ASSIST YOU IN MAKING THE APPT IF NEEDED

## 2017-05-26 NOTE — TELEPHONE ENCOUNTER
Reason for call:  Other   Patient called regarding (reason for call): patient is calling asking to speak to RN Bre the person that he spoke with the other day he would like to speak with her regarding there conversation they had and also about his ER visit last night  Additional comments:     Phone number to reach patient:  Home number on file [phone] (home)    Best Time:  ASAP    Can we leave a detailed message on this number?  YES

## 2017-05-26 NOTE — TELEPHONE ENCOUNTER
Writer called patient back.    Patient informed writer of ER findings from last night.    Per patient:  1. LFTs even more elevated last night  2. ER provider said patient does not need to worry about liver failure right now  3. US result showed fatty liver   A. He will take measures to protect his liver function  4. ER provider recommended patient have follow up labs on 5/29/17, but due to patient being out of town, ER provider said it is fine for patient to have follow up labs on 5/31/17 as previously scheduled  5. Insurance ends on 5/31/17 and will have 6 day gap between insurance coverage   A. Can he have follow up office visit later on 5/31/17 or a telephone visit on 6/1/17?    Writer informed patient SOULEYMANE Borrego CNP, changed lab order to STAT and in person office visit would be most appropriate for follow up.    Appt on 6/1/17 cancelled and follow up scheduled with Dr. Bush on 5/31/17 at 1600.    Writer informed patient writer will discuss personally with lab staff on 5/30/17 regarding need for lab to be run stat so results are hopefully back by time of appt.    Patient verbalized understanding and in agreement with plan.    RONALDO Sanchez, NEELAMN, RN

## 2017-05-26 NOTE — ED PROVIDER NOTES
History     Chief Complaint   Patient presents with     Abnormal Labs     Pt sent here by MD for elevated Liver enzymes.     HPI  Jaun Lobo is a 42 year old male with a history of alcohol dependence in remission on Antabuse who presents to the Emergency Department for evaluation of abnormal labs. The patient is currently taking Antabuse and had labs drawn yesterday. He received a phone call from his physician today informing him his ALT was high at 637 and AST of 222 and referred him to the ED. The patient states he is currently taking a quarter tablet of Antabuse daily. He denies any other concerns or complaints at this time. No Tylenol or Ibuprofen use.  No alcohol use in the past 3 months. Of note, the patient had received the Hepatitis B immunization. He has been told that he has a fatty liver. No hx of hepatitis or hiv.    Past Medical History:   Diagnosis Date     Alcohol dependence (H)      Allergic rhinitis, cause unspecified     allergy shots as kid     Anxiety state, unspecified     on Maryellen's     Back disorder     degenerative      DDD (degenerative disc disease)      Esophagitis      Lichen planus   2007     MEDICAL HISTORY OF - 3/10/10    ulcerative esophagitis     Mild intermittent asthma     rare use of albuteral     Tobacco use disorder     quit 2002     Unspecified hemorrhoids without mention of complication        Past Surgical History:   Procedure Laterality Date     C NONSPECIFIC PROCEDURE      nasal fx; left clavic fx     C NONSPECIFIC PROCEDURE  12/04    normal colonoscopy; rpt age 49     CL AFF SURGICAL PATHOLOGY       ESOPHAGOSCOPY, GASTROSCOPY, DUODENOSCOPY (EGD), COMBINED  5/9/2014    Procedure: COMBINED ESOPHAGOSCOPY, GASTROSCOPY, DUODENOSCOPY (EGD), BIOPSY SINGLE OR MULTIPLE;  Surgeon: Yanely Macias MD;  Location:  GI     wisdom teeth         Family History   Problem Relation Age of Onset     CEREBROVASCULAR DISEASE Maternal Grandfather      had strokes in his  80's     GASTROINTESTINAL DISEASE Mother      ulcerative colitis     Anxiety Disorder Mother      CANCER Other      esophageal cancer, cousin     C.A.D. No family hx of      DIABETES No family hx of      Hypertension No family hx of      Breast Cancer No family hx of      Cancer - colorectal No family hx of      Prostate Cancer No family hx of      Unknown/Adopted No family hx of      Depression No family hx of      Schizophrenia No family hx of      Bipolar Disorder No family hx of      Suicide No family hx of      Substance Abuse No family hx of      Dementia No family hx of      West Coxsackie Disease No family hx of      Parkinsonism No family hx of      Autism Spectrum Disorder No family hx of      Intellectual Disability (Mental Retardation) No family hx of      MENTAL ILLNESS No family hx of        Social History   Substance Use Topics     Smoking status: Never Smoker     Smokeless tobacco: Never Used     Alcohol use No      Comment: 40 drinks a week , quit drinking 6 weeks ago        No current facility-administered medications for this encounter.      Current Outpatient Prescriptions   Medication     temazepam (RESTORIL) 7.5 MG capsule     albuterol (PROAIR HFA, PROVENTIL HFA, VENTOLIN HFA) 108 (90 BASE) MCG/ACT inhaler     melatonin 3 MG tablet     ST PATRICK WORT 450 MG OR CAPS        Allergies   Allergen Reactions     Animal Dander      Dust Mite Extract      No Known Drug Allergies      Ragweeds      goldenrod     Seasonal Allergies      I have reviewed the Medications, Allergies, Past Medical and Surgical History, and Social History in the Epic system.    Review of Systems   Constitutional: Negative for chills and fever.   Eyes: Negative for discharge and redness.        No yellow eyes noted   Respiratory: Negative for cough and shortness of breath.    Cardiovascular: Negative for chest pain and palpitations.   Gastrointestinal: Negative for abdominal pain, nausea and vomiting.        Stools brown    Genitourinary:        Urine is yellow   Musculoskeletal: Negative for myalgias.   Skin:        No itching or yellow color   Allergic/Immunologic: Negative for immunocompromised state.   Neurological: Negative.    All other systems reviewed and are negative.      Physical Exam   BP: 130/73  Pulse: 68  Temp: 96.4  F (35.8  C)  Resp: 16  Weight: 85 kg (187 lb 7 oz)  SpO2: 98 %  Physical Exam   Constitutional: He is oriented to person, place, and time. He appears well-developed and well-nourished. No distress.   HENT:   Head: Normocephalic and atraumatic.   Eyes: Conjunctivae are normal. Pupils are equal, round, and reactive to light.   No icterus   Neck: Neck supple.   Cardiovascular: Normal rate and regular rhythm.    Pulmonary/Chest: No respiratory distress.   Abdominal: Soft. He exhibits no mass. There is no tenderness.   No hs dustin   Musculoskeletal: Normal range of motion.   Neurological: He is alert and oriented to person, place, and time.   Skin: Skin is warm and dry. No rash noted. He is not diaphoretic.   Psychiatric: He has a normal mood and affect. His behavior is normal. Judgment and thought content normal.   Nursing note and vitals reviewed.      ED Course     7:37 PM  The patient was seen and examined by Dr. Andrews in Room 20.     ED Course     Procedures  NOT SURE PT NEEDS ACUTE ED EVALUATION GIVEN THE LIVER FUNCTION TESTS IN THE CHART. HE LIKELY HAS LIVER IRRITATION DUE TO ANTABUSE.  I called gi fellow.  Since the pt is in the ed suggests   RUQ us  Repeat liver tests  Hepatitis screen  Labs Ordered and Resulted from Time of ED Arrival Up to the Time of Departure from the ED   HEPATIC PANEL - Abnormal; Notable for the following:        Result Value    Bilirubin Direct 0.3 (*)      (*)      (*)     All other components within normal limits     US shows fatty liver  LFTS not dissimilar from yesterday       Assessments & Plan (with Medical Decision Making)   Not certain why pt sent urgently  to the ed.  He has hepatitis lab wise.  Obviously he needs to stop the antabuse and all other medications except the melatonin which he needs for sleep.  The pt does not use any acetimenophen.  He does hunt and eat Haq mushrooms. It is conceivable that several weeks ago he mistook a liver toxic mushroom for a Haq.  He needs follow up. If the primary doctor is not able - there is a hepatology clinic at the [facility].    I have reviewed the nursing notes.    I have reviewed the findings, diagnosis, plan and need for follow up with the patient.    Discharge Medication List as of 5/25/2017  9:42 PM          Final diagnoses:   Hepatitis likely due to antabuse     IAmanda, am serving as a trained medical scribe to document services personally performed by Niels Andrews MD, based on the provider's statements to me.      Niels MARIE MD, was physically present and have reviewed and verified the accuracy of this note documented by Amanda Carlton.     5/25/2017   [facility], EMERGENCY DEPARTMENT     Niels Andrews MD  05/30/17 2042

## 2017-05-30 ENCOUNTER — TELEPHONE (OUTPATIENT)
Dept: FAMILY MEDICINE | Facility: CLINIC | Age: 42
End: 2017-05-30

## 2017-05-30 ASSESSMENT — ENCOUNTER SYMPTOMS
VOMITING: 0
PALPITATIONS: 0
NEUROLOGICAL NEGATIVE: 1
CHILLS: 0
MYALGIAS: 0
NAUSEA: 0
EYE DISCHARGE: 0
COUGH: 0
EYE REDNESS: 0

## 2017-05-30 NOTE — TELEPHONE ENCOUNTER
Writer spoke with Karli QUICK In laboratory regarding STAT hepatic panel tomorrow and patient having follow up appt tomorrow afternoon.    Per Karli:  1. Lab result will be back by time of patient's follow up appointment.    Left message on identified voicemail statin. Writer spoke with lab staff about STAT labs for tomorrow  2. Lab assured writer results will be back by follow up appt tomorrow afternoon  3. Please also mention during lab appt tomorrow that orders are STAT  4. Call with any questions or concerns.  Writer will be leaving clinic shortly, but any triage nurse can assist.    NEELAM SolorioN, RN

## 2017-05-31 ENCOUNTER — OFFICE VISIT (OUTPATIENT)
Dept: FAMILY MEDICINE | Facility: CLINIC | Age: 42
End: 2017-05-31
Payer: COMMERCIAL

## 2017-05-31 VITALS
OXYGEN SATURATION: 98 % | DIASTOLIC BLOOD PRESSURE: 79 MMHG | HEART RATE: 95 BPM | RESPIRATION RATE: 16 BRPM | HEIGHT: 73 IN | SYSTOLIC BLOOD PRESSURE: 130 MMHG | TEMPERATURE: 97.9 F | WEIGHT: 180.75 LBS | BODY MASS INDEX: 23.96 KG/M2

## 2017-05-31 DIAGNOSIS — R79.89 ELEVATED LFTS: Primary | ICD-10-CM

## 2017-05-31 DIAGNOSIS — R79.89 ELEVATED LFTS: ICD-10-CM

## 2017-05-31 LAB
ALBUMIN SERPL-MCNC: 4 G/DL (ref 3.4–5)
ALP SERPL-CCNC: 82 U/L (ref 40–150)
ALT SERPL W P-5'-P-CCNC: 679 U/L (ref 0–70)
AST SERPL W P-5'-P-CCNC: 241 U/L (ref 0–45)
BILIRUB DIRECT SERPL-MCNC: 0.2 MG/DL (ref 0–0.2)
BILIRUB SERPL-MCNC: 1.2 MG/DL (ref 0.2–1.3)
PROT SERPL-MCNC: 6.9 G/DL (ref 6.8–8.8)

## 2017-05-31 PROCEDURE — 99213 OFFICE O/P EST LOW 20 MIN: CPT | Performed by: FAMILY MEDICINE

## 2017-05-31 PROCEDURE — 36415 COLL VENOUS BLD VENIPUNCTURE: CPT | Performed by: NURSE PRACTITIONER

## 2017-05-31 PROCEDURE — 80076 HEPATIC FUNCTION PANEL: CPT | Performed by: NURSE PRACTITIONER

## 2017-05-31 NOTE — MR AVS SNAPSHOT
After Visit Summary   5/31/2017    Jaun Lobo    MRN: [id number]           Patient Information     Date Of Birth          1975        Visit Information        Provider Department      5/31/2017 4:00 PM Parker Bush MD [facility]        Today's Diagnoses     Elevated LFTs    -  1       Follow-ups after your visit        Additional Services     GASTROENTEROLOGY ADULT REF CONSULT ONLY       Preferred Location: [facility], [facility] [phone]   OR  [facility] [phone]      Please be aware that coverage of these services is subject to the terms and limitations of your health insurance plan.  Call member services at your health plan with any benefit or coverage questions.  Any procedures must be performed at a [facility] facility OR coordinated by your clinic's referral office.    Please bring the following with you to your appointment:    (1) Any X-Rays, CTs or MRIs which have been performed.  Contact the facility where they were done to arrange for  prior to your scheduled appointment.    (2) List of current medications   (3) This referral request   (4) Any documents/labs given to you for this referral                  Future tests that were ordered for you today     Open Standing Orders        Priority Remaining Interval Expires Ordered    **Hepatic panel FUTURE 2mo Routine 5/5 5/31/2018 5/31/2017            Who to contact     If you have questions or need follow up information about today's clinic visit or your schedule please contact [facility] directly at [phone].  Normal or non-critical lab and imaging results will be communicated to you by MyChart, letter or phone within 4 business days after the clinic has received the results. If you do not hear from us within 7 days, please contact the clinic through MyChart or phone. If you have a critical or abnormal lab result, we will notify you by phone as soon as possible.  Submit refill  "requests through Specle or call your pharmacy and they will forward the refill request to us. Please allow 3 business days for your refill to be completed.          Additional Information About Your Visit        [facility] Information     Specle lets you send messages to your doctor, view your test results, renew your prescriptions, schedule appointments and more. To sign up, go to [email or web address] . Click on \"Log in\" on the left side of the screen, which will take you to the Welcome page. Then click on \"Sign up Now\" on the right side of the page.     You will be asked to enter the access code listed below, as well as some personal information. Please follow the directions to create your username and password.     Your access code is: [email or web address]  Expires: 2017  4:36 PM     Your access code will  in 90 days. If you need help or a new code, please call your [facility] clinic or [phone].        Care EveryWhere ID     This is your Care EveryWhere ID. This could be used by other organizations to access your [facility] medical records  [id number]        Your Vitals Were     Pulse Temperature Respirations Height Pulse Oximetry BMI (Body Mass Index)    95 97.9  F (36.6  C) (Oral) 16 6' 1\" (1.854 m) 98% 23.85 kg/m2       Blood Pressure from Last 3 Encounters:   17 130/79   17 137/87   17 132/82    Weight from Last 3 Encounters:   17 180 lb 12 oz (82 kg)   17 187 lb 7 oz (85 kg)   17 184 lb 8 oz (83.7 kg)              We Performed the Following     GASTROENTEROLOGY ADULT REF CONSULT ONLY        Primary Care Provider Office Phone # Fax #    Parker Komal Bush [phone]       [facility] [address] [place] [id number]        Thank you!     Thank you for choosing [facility]  for your care. Our goal is always to provide you with excellent care. Hearing back from our patients is one way we can continue to improve " our services. Please take a few minutes to complete the written survey that you may receive in the mail after your visit with us. Thank you!             Your Updated Medication List - Protect others around you: Learn how to safely use, store and throw away your medicines at www.disposemymeds.org.          This list is accurate as of: 5/31/17  4:35 PM.  Always use your most recent med list.                   Brand Name Dispense Instructions for use    albuterol 108 (90 BASE) MCG/ACT Inhaler    PROAIR HFA/PROVENTIL HFA/VENTOLIN HFA     Inhale 2 puffs into the lungs every 4 hours as needed for shortness of breath / dyspnea or wheezing       melatonin 3 MG tablet      Take 3 mg by mouth nightly as needed.       St Johns Wort 450 MG Caps     60    once per day       temazepam 7.5 MG capsule    RESTORIL    30 capsule    Take 1 capsule (7.5 mg) by mouth nightly as needed for sleep

## 2017-05-31 NOTE — TELEPHONE ENCOUNTER
Patient called clinic, asked to speak with writer but writer was unavailable.    Writer called patient back.    Per patient:  1. Got writer's message and did inform lab about STAT labs drawn this AM   A. Lab staff stated STAT notation was not on hepatic panel order  2. Wanted to double check labs were sent STAT    Writer informed patient:  1. Yes, lab appears to have been sent out STAT and Dr. Bush will have results to review at this afternoon's appt.    Patient verbalized understanding and in agreement with plan.  RONALDO Sanchez, NEELAMN, RN

## 2017-05-31 NOTE — NURSING NOTE
"Chief Complaint   Patient presents with     ER F/U       Initial /79 (BP Location: Left arm, Patient Position: Chair, Cuff Size: Adult Large)  Pulse 95  Temp 97.9  F (36.6  C) (Oral)  Resp 16  Ht 6' 1\" (1.854 m)  Wt 180 lb 12 oz (82 kg)  SpO2 98%  BMI 23.85 kg/m2 Estimated body mass index is 23.85 kg/(m^2) as calculated from the following:    Height as of this encounter: 6' 1\" (1.854 m).    Weight as of this encounter: 180 lb 12 oz (82 kg).  Medication Reconciliation: complete     Sabrina Dodson MA      "

## 2017-05-31 NOTE — PROGRESS NOTES
SUBJECTIVE:                                                    Jaun Lobo is a 42 year old male who presents to clinic today for the following health issues:    ED/UC Followup:    [facility]   Date of visit: 05/25/2017  Reason for visit: elevated liver enzymes  Current Status: feeling good     No stomach pain, no nausea, no vomiting, feeling fine. No skin color changes, no dizziness.     Felt perfectly fine.     Last dose of antabuse - last Thursday.     Started using melatonin around a month ago.     Has upcoming trip for 9 days.     Problem list and histories reviewed & adjusted, as indicated.  Additional history: as documented    Labs reviewed in EPIC    Reviewed and updated as needed this visit by clinical staff    Reviewed and updated as needed this visit by Provider        Social History     Social History     Marital status: Single     Spouse name: N/A     Number of children: 0     Years of education: N/A     Occupational History       [employer]     Social History Main Topics     Smoking status: Never Smoker     Smokeless tobacco: Never Used     Alcohol use No      Comment: 40 drinks a week , quit drinking 6 weeks ago      Drug use: No     Sexual activity: Yes     Partners: Female     Other Topics Concern     Parent/Sibling W/ Cabg, Mi Or Angioplasty Before 65f 55m? No     Social History Narrative    Balanced Diet - Yes    Osteoporosis Preventative measures-  Dairy servings per day: 2 and Weight bearing exercise    Regular Exercise -  Yes Describe Runs every AM for 15 minutes.    Dental Exam up - YES - Date: 05/2003    Seatbelts used - Yes    Self Testicular Exam -  Yes    Abuse: Current or Past (Physical, Sexual or Emotional)- No    Do you have any concerns about STD's -  No    Do you feel safe in your environment - Yes    Guns stored in the home - No    Sunscreen used - No    Lipids - YES - Date: Has been several years.    Glucose -  YES - Date: Has been several years.     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                         Allergies   Allergen Reactions     Animal Dander      Dust Mite Extract      No Known Drug Allergies      Ragweeds      goldenrod     Seasonal Allergies      Patient Active Problem List   Diagnosis     Allergic rhinitis     Anxiety state     Headache     Ankylosing spondylitis (H)     DDD (degenerative disc disease), lumbar     Low back pain     Herpes simplex esophagitis     CARDIOVASCULAR SCREENING; LDL GOAL LESS THAN 160     Insomnia     GERD (gastroesophageal reflux disease)     Esophagitis     Alcohol dependence in remission (HCC)     Reviewed medications, social history and  past medical and surgical history.    Review of system: for general, respiratory, CVS, GI and psychiatry negative except for noted above.     EXAM:  /79 (BP Location: Left arm, Patient Position: Chair, Cuff Size: Adult Large)  Pulse 95  Temp 97.9  F (36.6  C) (Oral)  Resp 16  Ht 6' 1\" (185.4 cm)  Wt 180 lb 12 oz (82 kg)  SpO2 98%  BMI 23.85 kg/m2  Constitutional: healthy, alert and no distress   Psychiatric: mentation appears normal and affect normal/bright  GI - no abdominal deformity, bowel sounds normal, no right upper quadrant tenderness, no hepatosplenomegaly.     ASSESSMENT / PLAN:  (A65.94) Elevated LFTs  (primary encounter diagnosis)  Comment: persistently. Tiny bit better from ER visit. Fortunately no gallbladder disease and infectious hepatitis workup was negative. Stopped antabuse. Using melatonin which also has hepatic metabolism but I am not sure entirely sure if that is the main culprit. We will also stop melatonin. He is not using any OTC, herbal products etc. Will also avoid any raw, uncooked food, mushroom etc. Will come back for LFTs in a week. " Should call GI right now to john paulle an appointment as he may not be able to see them for weeks. If his LFTs are trending down - ok to cancel appt. He agreed. We talked about going back to ER if he is becoming symptomatic. He agreed.   Plan: GASTROENTEROLOGY ADULT REF CONSULT ONLY,         **Hepatic panel FUTURE 2mo               There are no Patient Instructions on file for this visit.

## 2017-06-20 DIAGNOSIS — R79.89 ELEVATED LFTS: ICD-10-CM

## 2017-06-20 PROCEDURE — 36415 COLL VENOUS BLD VENIPUNCTURE: CPT | Performed by: FAMILY MEDICINE

## 2017-06-20 PROCEDURE — 80076 HEPATIC FUNCTION PANEL: CPT | Performed by: FAMILY MEDICINE

## 2017-06-21 ENCOUNTER — TELEPHONE (OUTPATIENT)
Dept: FAMILY MEDICINE | Facility: CLINIC | Age: 42
End: 2017-06-21

## 2017-06-21 LAB
ALBUMIN SERPL-MCNC: 3.9 G/DL (ref 3.4–5)
ALP SERPL-CCNC: 77 U/L (ref 40–150)
ALT SERPL W P-5'-P-CCNC: 526 U/L (ref 0–70)
AST SERPL W P-5'-P-CCNC: 186 U/L (ref 0–45)
BILIRUB DIRECT SERPL-MCNC: 0.2 MG/DL (ref 0–0.2)
BILIRUB SERPL-MCNC: 1 MG/DL (ref 0.2–1.3)
PROT SERPL-MCNC: 6.6 G/DL (ref 6.8–8.8)

## 2017-06-21 NOTE — TELEPHONE ENCOUNTER
Dr. uBsh huddled with writer regarding hepatic lab results and patient to return for repeat hepatic panel in 1-2 weeks.    Left detailed message on identified voicemail relaying message per below and follow up recommendation per Dr. Bush.    RONALDO Sanchez, BSN, RN

## 2017-06-21 NOTE — TELEPHONE ENCOUNTER
ALT is back and it is still high but down from 89 yo 526.  Please notify him - recheck labs as suggested by nurse. Ideally in 2 weeks or so.     Results for orders placed or performed in visit on 06/20/17   **Hepatic panel FUTURE 2mo   Result Value Ref Range    Bilirubin Direct 0.2 0.0 - 0.2 mg/dL    Bilirubin Total 1.0 0.2 - 1.3 mg/dL    Albumin 3.9 3.4 - 5.0 g/dL    Protein Total 6.6 (L) 6.8 - 8.8 g/dL    Alkaline Phosphatase 77 40 - 150 U/L     (HH) 0 - 70 U/L     (H) 0 - 45 U/L

## 2017-06-21 NOTE — TELEPHONE ENCOUNTER
Reason for Call:  Request for results:    Name of test or procedure: **Hepatic panel FUTURE 2mo [LAB20] (Order [id number])     Date of test of procedure: 6/20/2017    Location of the test or procedure:  Lab    OK to leave the result message on voice mail or with a family member? YES    Phone number Patient can be reached at:  Cell number on file:    Telephone Information:   Mobile [phone]       Additional comments: Patient is requesting a call back with his lab results from the date above. Informed him it states 'in process' and patient is wondering why they are not final yet. Please follow up. Thanks!     Call taken on 6/21/2017 at 11:48 AM by Zo Turner

## 2017-06-21 NOTE — TELEPHONE ENCOUNTER
Patient called back and was informed of messages per below from Dr. Bush.    Patient verbalized understanding and in agreement with plan.    Patient asked questions about trends of AST and ALT lab results.    Writer reviewed these with patient.      Lab appt scheduled on 7/6/17.  Appt date, time and location confirmed with patient.    NEELAM SolorioN, RN

## 2017-06-21 NOTE — TELEPHONE ENCOUNTER
Detailed message left on identified voicemail relaying message per below from Dr. Bush.    RONALDO Sanchez, BSN, RN

## 2017-06-21 NOTE — TELEPHONE ENCOUNTER
ALT is still pending.  AST is improving really well and I suspect ALT would be the same.  Once we have results, we can follow up (I will be out of the clinic and my partners can look up if there is any concerns )

## 2017-06-21 NOTE — TELEPHONE ENCOUNTER
6/20/17 hepatic panel results are back.    Dr. Bush-please advise.    Thank you!  RONALDO Sanchez, NEELAMN, RN

## 2017-06-24 ENCOUNTER — HOSPITAL ENCOUNTER (EMERGENCY)
Facility: CLINIC | Age: 42
Discharge: HOME | End: 2017-06-25
Attending: EMERGENCY MEDICINE | Admitting: EMERGENCY MEDICINE
Payer: COMMERCIAL

## 2017-06-24 DIAGNOSIS — R42 DIZZINESS: ICD-10-CM

## 2017-06-24 DIAGNOSIS — R55 NEAR SYNCOPE: ICD-10-CM

## 2017-06-24 PROCEDURE — 96360 HYDRATION IV INFUSION INIT: CPT

## 2017-06-24 PROCEDURE — 96361 HYDRATE IV INFUSION ADD-ON: CPT

## 2017-06-24 PROCEDURE — 93005 ELECTROCARDIOGRAM TRACING: CPT

## 2017-06-24 PROCEDURE — 99284 EMERGENCY DEPT VISIT MOD MDM: CPT

## 2017-06-24 NOTE — ED AVS SNAPSHOT
Emergency Department    [address] [place] [address]    Phone:  [phone]    Fax:  [phone]                                       Jaun Lobo   MRN: [id number]    Department:   Emergency Department   Date of Visit:  6/24/2017           After Visit Summary Signature Page     I have received my discharge instructions, and my questions have been answered. I have discussed any challenges I see with this plan with the nurse or doctor.    ..........................................................................................................................................  Patient/Patient Representative Signature      ..........................................................................................................................................  Patient Representative Print Name and Relationship to Patient    ..................................................               ................................................  Date                                            Time    ..........................................................................................................................................  Reviewed by Signature/Title    ...................................................              ..............................................  Date                                                            Time

## 2017-06-24 NOTE — ED AVS SNAPSHOT
Emergency Department    [address] [facility] [address]    Phone:  [phone]    Fax:  [phone]                                       Jaun Lobo   MRN: [id number]    Department:   Emergency Department   Date of Visit:  6/24/2017           Patient Information     Date Of Birth          1975        Your diagnoses for this visit were:     Dizziness     Near syncope        You were seen by Demetris Corbett MD.      Follow-up Information     Follow up with Parker Bush MD. Schedule an appointment as soon as possible for a visit in 1 week.    Specialty:  Family Practice    Contact information:    [place] [id number] [facility] [address] [phone]          Follow up with  Emergency Department.    Specialty:  EMERGENCY MEDICINE    Why:  If symptoms worsen    Contact information:    [address] [facility] [id number] [phone]        Discharge Instructions         Understanding Dizziness, Balance Problems, and Fainting     The eyes, inner ear, joints, and muscles send signals to the brain to achieve balance.     Balance is a group effort of the eyes, inner ear, joints, and muscles. They each send signals to the brain about body position and head movement. Then the brain uses this information to achieve balance. When the brain receives conflicting signals, or when there is a problem with blood flow, dizziness or fainting can happen.  Vertigo  Vertigo is the feeling of spinning. It may happen if the brain receives conflicting balance signals. Vertigo is often caused by a problem in the inner ear. Problems include changes in inner ear structures, infection, swelling, or excess fluid. Sometimes vertigo is due to a brain problem, such as migraine.   Dysequilibrium  Dysequilibrium is the feeling of imbalance without a sense of spinning. It may happen if the signal path between the body and brain is disrupted. There are many  causes of dysequilibrium, including diabetes, anemia, head injury, and aging.  Syncope  Syncope is losing consciousness or fainting. The brain needs oxygen-rich blood to function. The heart pumps that blood to the brain. If there is a problem with the heart, blood flow (such as low blood pressure), or blood vessels, faintness may happen.  Date Last Reviewed: 10/6/2015    7941-1961 [employer]. [address] [place] [address], [place] [address]. All rights reserved. This information is not intended as a substitute for professional medical care. Always follow your healthcare professional's instructions.          Inner Ear Problems: Causes of Dizziness (Vertigo)       Benign positional vertigo (BPV)  This is the most common cause of vertigo. BPV is also called benign positional paroxysmal vertigo (BPPV). It happens when crystals in the ear canals shift into the wrong place. Vertigo usually occurs when you move your head in a certain way. This can happen when turning in bed, bending, or looking up. Because BPV comes on quickly, you should think about if you are safe to drive or do other tasks that need your full attention.  BPV:    Causes vertigo that last for seconds. Vertigo can occur several times a day, depending on body position.    Doesn t cause hearing loss    Often goes away on its own. But it but may go away sooner with treatment.  Infection or inflammation  Sometimes the semicircular canals swell and send incorrect balance signals. This problem may be caused by a viral infection. Depending on the cause, your hearing can be affected (labyrinthitis). Or your hearing can remain normal (neuronitis).  Infection or inflammation:    Causes vertigo that lasts for hours or days. The first episode is usually the worst.    Can cause hearing loss    Often goes away on its own. But it may go away sooner with treatment.  You may need vestibular rehabilitation if you have balance problems that don't go away.  Meniere s  disease  This condition is uncommon. It happens when there is too much fluid in the ear canals. This causes increased pressure and swelling. It affects balance and hearing signals.  Meniere s disease may:    Cause vertigo that last for hours    Cause hearing problems that come and go. The problems are usually in one ear and get worse over time.    Cause buzzing or ringing in the ears (tinnitus)    Cause a feeling of fullness or pressure in the ear    Cause any of these symptoms: vertigo, hearing loss, tinnitus, or ear fullness to last a lifetime  Date Last Reviewed: 11/1/2016 2000-2017 [employer]. [address] [place] [address]. All rights reserved. This information is not intended as a substitute for professional medical care. Always follow your healthcare professional's instructions.          Discharge References/Attachments     VERTIGO, UNSPECIFIED (ENGLISH)      Future Appointments        Provider Department Dept Phone Center    7/6/2017 8:15 AM [facility] [phone]     8/1/2017 8:30 AM [facility] [phone] [facility]    8/1/2017 9:30 AM Brenda Posey MD [facility] [phone] [facility]      24 Hour Appointment Hotline       To make an appointment at any [facility], call [phone] ([phone]). If you don't have a family doctor or clinic, we will help you find one. [place] clinics are conveniently located to serve the needs of you and your family.             Review of your medicines      START taking        Dose / Directions Last dose taken    meclizine 25 MG tablet   Commonly known as:  ANTIVERT   Dose:  25 mg   Quantity:  30 tablet        Take 1 tablet (25 mg) by mouth 4 times daily as needed for dizziness   Refills:  0          Our records show that you are taking the medicines listed below. If these are incorrect, please call your family doctor or clinic.        Dose / Directions Last dose taken    albuterol  108 (90 BASE) MCG/ACT Inhaler   Commonly known as:  PROAIR HFA/PROVENTIL HFA/VENTOLIN HFA   Dose:  2 puff        Inhale 2 puffs into the lungs every 4 hours as needed for shortness of breath / dyspnea or wheezing   Refills:  0        melatonin 3 MG tablet   Dose:  3 mg        Take 3 mg by mouth nightly as needed.   Refills:  0        temazepam 7.5 MG capsule   Commonly known as:  RESTORIL   Dose:  7.5 mg   Quantity:  30 capsule        Take 1 capsule (7.5 mg) by mouth nightly as needed for sleep   Refills:  2                Prescriptions were sent or printed at these locations (1 Prescription)                   Other Prescriptions                Printed at Department/Unit printer (1 of 1)         meclizine (ANTIVERT) 25 MG tablet                Procedures and tests performed during your visit     CBC with platelets differential    Comprehensive metabolic panel    EKG 12 lead    Orthostatic blood pressure and pulse    Troponin I      Orders Needing Specimen Collection     None      Pending Results     No orders found for last 3 day(s).            Pending Culture Results     No orders found for last 3 day(s).            Pending Results Instructions     If you had any lab results that were not finalized at the time of your Discharge, you can call the ED Lab Result RN at [phone]. You will be contacted by this team for any positive Lab results or changes in treatment. The nurses are available 7 days a week from 10A to 6:30P.  You can leave a message 24 hours per day and they will return your call.        Test Results From Your Hospital Stay        6/25/2017 12:35 AM      Component Results     Component Value Ref Range & Units Status    WBC 6.0 4.0 - 11.0 10e9/L Final    RBC Count 4.88 4.4 - 5.9 10e12/L Final    Hemoglobin 15.0 13.3 - 17.7 g/dL Final    Hematocrit 42.7 40.0 - 53.0 % Final    MCV 88 78 - 100 fl Final    MCH 30.7 26.5 - 33.0 pg Final    MCHC 35.1 31.5 - 36.5 g/dL Final    RDW 12.4 10.0 - 15.0 % Final     Platelet Count 210 150 - 450 10e9/L Final    Diff Method Automated Method  Final    % Neutrophils 62.0 % Final    % Lymphocytes 24.9 % Final    % Monocytes 9.2 % Final    % Eosinophils 2.8 % Final    % Basophils 0.8 % Final    % Immature Granulocytes 0.3 % Final    Absolute Neutrophil 3.7 1.6 - 8.3 10e9/L Final    Absolute Lymphocytes 1.5 0.8 - 5.3 10e9/L Final    Absolute Monocytes 0.6 0.0 - 1.3 10e9/L Final    Absolute Eosinophils 0.2 0.0 - 0.7 10e9/L Final    Absolute Basophils 0.1 0.0 - 0.2 10e9/L Final    Abs Immature Granulocytes 0.0 0 - 0.4 10e9/L Final         6/25/2017 12:57 AM      Component Results     Component Value Ref Range & Units Status    Sodium 140 133 - 144 mmol/L Final    Potassium 3.7 3.4 - 5.3 mmol/L Final    Chloride 108 94 - 109 mmol/L Final    Carbon Dioxide 25 20 - 32 mmol/L Final    Anion Gap 7 3 - 14 mmol/L Final    Glucose 130 (H) 70 - 99 mg/dL Final    Urea Nitrogen 15 7 - 30 mg/dL Final    Creatinine 0.86 0.66 - 1.25 mg/dL Final    GFR Estimate >90  Non  GFR Calc   >60 mL/min/1.7m2 Final    GFR Estimate If Black >90   GFR Calc   >60 mL/min/1.7m2 Final    Calcium 8.5 8.5 - 10.1 mg/dL Final    Bilirubin Total 0.9 0.2 - 1.3 mg/dL Final    Albumin 3.8 3.4 - 5.0 g/dL Final    Protein Total 6.6 (L) 6.8 - 8.8 g/dL Final    Alkaline Phosphatase 80 40 - 150 U/L Final     (H) 0 - 70 U/L Final     (H) 0 - 45 U/L Final         6/25/2017  2:08 AM      Component Results     Component Value Ref Range & Units Status    Troponin I ES  0.000 - 0.045 ug/L Final    <0.015  The 99th percentile for upper reference range is 0.045 ug/L.  Troponin values in   the range of 0.045 - 0.120 ug/L may be associated with risks of adverse   clinical events.                  Clinical Quality Measure: Blood Pressure Screening     Your blood pressure was checked while you were in the emergency department today. The last reading we obtained was  BP: 109/62 . Please read the  "guidelines below about what these numbers mean and what you should do about them.  If your systolic blood pressure (the top number) is less than 120 and your diastolic blood pressure (the bottom number) is less than 80, then your blood pressure is normal. There is nothing more that you need to do about it.  If your systolic blood pressure (the top number) is 120-139 or your diastolic blood pressure (the bottom number) is 80-89, your blood pressure may be higher than it should be. You should have your blood pressure rechecked within a year by a primary care provider.  If your systolic blood pressure (the top number) is 140 or greater or your diastolic blood pressure (the bottom number) is 90 or greater, you may have high blood pressure. High blood pressure is treatable, but if left untreated over time it can put you at risk for heart attack, stroke, or kidney failure. You should have your blood pressure rechecked by a primary care provider within the next 4 weeks.  If your provider in the emergency department today gave you specific instructions to follow-up with your doctor or provider even sooner than that, you should follow that instruction and not wait for up to 4 weeks for your follow-up visit.        Thank you for choosing [facility]       Thank you for choosing [facility] for your care. Our goal is always to provide you with excellent care. Hearing back from our patients is one way we can continue to improve our services. Please take a few minutes to complete the written survey that you may receive in the mail after you visit with us. Thank you!        [employer] Information     [employer] lets you send messages to your doctor, view your test results, renew your prescriptions, schedule appointments and more. To sign up, go to [email or web address] . Click on \"Log in\" on the left side of the screen, which will take you to the Welcome page. Then click on \"Sign up Now\" on the right side of the page.     You will be " asked to enter the access code listed below, as well as some personal information. Please follow the directions to create your username and password.     Your access code is: [email or web address]  Expires: 2017  3:17 AM     Your access code will  in 90 days. If you need help or a new code, please call your [place] clinic or [phone].        Care EveryWhere ID     This is your Care EveryWhere ID. This could be used by other organizations to access your [place] medical records  [id number]        Equal Access to Services     [facility]: Haddelores Tuttle, tato coffey, mustapha jeanaljacob schrader, stacy chairez . So [facility] [phone].    ATENCIÓN: Si habla español, tiene a bowman disposición servicios gratuitos de asistencia lingüística. Jessicaame al [phone].    We comply with applicable federal civil rights laws and [place] laws. We do not discriminate on the basis of race, color, national origin, age, disability sex, sexual orientation or gender identity.            After Visit Summary       This is your record. Keep this with you and show to your community pharmacist(s) and doctor(s) at your next visit.

## 2017-06-25 VITALS
HEIGHT: 74 IN | HEART RATE: 82 BPM | RESPIRATION RATE: 11 BRPM | WEIGHT: 180 LBS | TEMPERATURE: 97.7 F | OXYGEN SATURATION: 95 % | DIASTOLIC BLOOD PRESSURE: 62 MMHG | BODY MASS INDEX: 23.1 KG/M2 | SYSTOLIC BLOOD PRESSURE: 109 MMHG

## 2017-06-25 LAB
ALBUMIN SERPL-MCNC: 3.8 G/DL (ref 3.4–5)
ALP SERPL-CCNC: 80 U/L (ref 40–150)
ALT SERPL W P-5'-P-CCNC: 479 U/L (ref 0–70)
ANION GAP SERPL CALCULATED.3IONS-SCNC: 7 MMOL/L (ref 3–14)
AST SERPL W P-5'-P-CCNC: 177 U/L (ref 0–45)
BASOPHILS # BLD AUTO: 0.1 10E9/L (ref 0–0.2)
BASOPHILS NFR BLD AUTO: 0.8 %
BILIRUB SERPL-MCNC: 0.9 MG/DL (ref 0.2–1.3)
BUN SERPL-MCNC: 15 MG/DL (ref 7–30)
CALCIUM SERPL-MCNC: 8.5 MG/DL (ref 8.5–10.1)
CHLORIDE SERPL-SCNC: 108 MMOL/L (ref 94–109)
CO2 SERPL-SCNC: 25 MMOL/L (ref 20–32)
CREAT SERPL-MCNC: 0.86 MG/DL (ref 0.66–1.25)
DIFFERENTIAL METHOD BLD: NORMAL
EOSINOPHIL # BLD AUTO: 0.2 10E9/L (ref 0–0.7)
EOSINOPHIL NFR BLD AUTO: 2.8 %
ERYTHROCYTE [DISTWIDTH] IN BLOOD BY AUTOMATED COUNT: 12.4 % (ref 10–15)
GFR SERPL CREATININE-BSD FRML MDRD: ABNORMAL ML/MIN/1.7M2
GLUCOSE SERPL-MCNC: 130 MG/DL (ref 70–99)
HCT VFR BLD AUTO: 42.7 % (ref 40–53)
HGB BLD-MCNC: 15 G/DL (ref 13.3–17.7)
IMM GRANULOCYTES # BLD: 0 10E9/L (ref 0–0.4)
IMM GRANULOCYTES NFR BLD: 0.3 %
INTERPRETATION ECG - MUSE: NORMAL
LYMPHOCYTES # BLD AUTO: 1.5 10E9/L (ref 0.8–5.3)
LYMPHOCYTES NFR BLD AUTO: 24.9 %
MCH RBC QN AUTO: 30.7 PG (ref 26.5–33)
MCHC RBC AUTO-ENTMCNC: 35.1 G/DL (ref 31.5–36.5)
MCV RBC AUTO: 88 FL (ref 78–100)
MONOCYTES # BLD AUTO: 0.6 10E9/L (ref 0–1.3)
MONOCYTES NFR BLD AUTO: 9.2 %
NEUTROPHILS # BLD AUTO: 3.7 10E9/L (ref 1.6–8.3)
NEUTROPHILS NFR BLD AUTO: 62 %
PLATELET # BLD AUTO: 210 10E9/L (ref 150–450)
POTASSIUM SERPL-SCNC: 3.7 MMOL/L (ref 3.4–5.3)
PROT SERPL-MCNC: 6.6 G/DL (ref 6.8–8.8)
RBC # BLD AUTO: 4.88 10E12/L (ref 4.4–5.9)
SODIUM SERPL-SCNC: 140 MMOL/L (ref 133–144)
TROPONIN I SERPL-MCNC: NORMAL UG/L (ref 0–0.04)
WBC # BLD AUTO: 6 10E9/L (ref 4–11)

## 2017-06-25 PROCEDURE — 96360 HYDRATION IV INFUSION INIT: CPT

## 2017-06-25 PROCEDURE — 25000128 H RX IP 250 OP 636: Performed by: EMERGENCY MEDICINE

## 2017-06-25 PROCEDURE — 25000131 ZZH RX MED GY IP 250 OP 636 PS 637: Performed by: EMERGENCY MEDICINE

## 2017-06-25 PROCEDURE — 84484 ASSAY OF TROPONIN QUANT: CPT | Performed by: EMERGENCY MEDICINE

## 2017-06-25 PROCEDURE — 80053 COMPREHEN METABOLIC PANEL: CPT | Performed by: EMERGENCY MEDICINE

## 2017-06-25 PROCEDURE — 85025 COMPLETE CBC W/AUTO DIFF WBC: CPT | Performed by: EMERGENCY MEDICINE

## 2017-06-25 PROCEDURE — 96361 HYDRATE IV INFUSION ADD-ON: CPT

## 2017-06-25 RX ORDER — MECLIZINE HYDROCHLORIDE 25 MG/1
25 TABLET ORAL 4 TIMES DAILY PRN
Qty: 30 TABLET | Refills: 0 | Status: SHIPPED | OUTPATIENT
Start: 2017-06-25 | End: 2018-05-08

## 2017-06-25 RX ORDER — LIDOCAINE 40 MG/G
CREAM TOPICAL
Status: DISCONTINUED | OUTPATIENT
Start: 2017-06-25 | End: 2017-06-25 | Stop reason: HOSPADM

## 2017-06-25 RX ORDER — MECLIZINE HYDROCHLORIDE 25 MG/1
25 TABLET ORAL ONCE
Status: COMPLETED | OUTPATIENT
Start: 2017-06-25 | End: 2017-06-25

## 2017-06-25 RX ORDER — SODIUM CHLORIDE 9 MG/ML
1000 INJECTION, SOLUTION INTRAVENOUS CONTINUOUS
Status: DISCONTINUED | OUTPATIENT
Start: 2017-06-25 | End: 2017-06-25 | Stop reason: HOSPADM

## 2017-06-25 RX ADMIN — SODIUM CHLORIDE 1000 ML: 9 INJECTION, SOLUTION INTRAVENOUS at 00:32

## 2017-06-25 RX ADMIN — MECLIZINE HYDROCHLORIDE 25 MG: 25 TABLET ORAL at 00:10

## 2017-06-25 ASSESSMENT — ENCOUNTER SYMPTOMS
NUMBNESS: 1
DIZZINESS: 1

## 2017-06-25 NOTE — ED PROVIDER NOTES
"  History     Chief Complaint:  Fall     HPI   Jaun Lobo is a 42 year old male who presents to the emergency department today for evaluation of a fall. The patient reports earlier this evening he was at a movie with a girlfriend when he went to use the restroom. While urinating he became dizzy and fell forward, striking his head on the tile wall, and \"everything went black for a short period\". He denies hitting the ground. Additionally, he reports nausea for a short time after the fall. Due to the fall and loss of consciousness, he presents to the emergency department for further evaluation. On exam, patient endorses numbness to his cheeks bilaterally. His girlfriend states the patient is acting normal and walked out of the theater after the incident with no problem. He states he normally has a good diet, however today he ate high amounts of sugar and pizza. Of note, patient voiced concern that this episode may be connected to recent liver problems related to Antabuse he was taking, however he has not taken this in three weeks.     Allergies:  No known drug allergies.    Medications:    temazepam (RESTORIL) 7.5 MG capsule  albuterol (PROAIR HFA, PROVENTIL HFA, VENTOLIN HFA) 108 (90 BASE) MCG/ACT inhaler    Past Medical History:    Alcohol dependence    Allergic rhinitis, cause unspecified   Anxiety state, unspecified   Back disorder   DDD (degenerative disc disease)   Esophagitis   Lichen planus     Mild intermittent asthma   Tobacco use disorder   Unspecified hemorrhoids without mention of complication     Past Surgical History:    Nasal and left clavicle fix  CL AFF surgical pathology   [place] teeth removal     Family History:    Ulcerative colitis   Anxiety disorder     Social History:  The patient was accompanied to the ED by his girlfriend.  Smoking Status: Former light smoker  Smokeless Tobacco: Never used   Alcohol Use: Former heavy drinker    Marital Status:  Single     Review of Systems " "  Neurological: Positive for dizziness and numbness (cheeks bilaterally). Negative for syncope.        Positive near syncope   All other systems reviewed and are negative.    Physical Exam     Patient Vitals for the past 24 hrs:   BP Temp Temp src Pulse Heart Rate Resp SpO2 Height Weight   06/25/17 0230 109/62 - - - 55 11 95 % - -   06/25/17 0215 115/62 - - - 52 13 97 % - -   06/25/17 0200 109/56 - - - 52 10 97 % - -   06/25/17 0145 110/56 - - - 56 8 - - -   06/25/17 0130 108/55 - - - 55 11 98 % - -   06/25/17 0115 107/57 - - - 56 (!) 39 - - -   06/25/17 0100 110/58 - - - 60 (!) 7 96 % - -   06/25/17 0045 115/57 - - - 63 11 98 % - -   06/25/17 0015 125/66 - - 82 - - 98 % - -   06/25/17 0000 125/62 - - 86 - - 97 % - -   06/24/17 2345 113/59 - - - - - 96 % - -   06/24/17 2330 - - - - - - 100 % - -   06/24/17 2303 132/55 97.7  F (36.5  C) Oral - 73 12 100 % 1.88 m (6' 2\") 81.6 kg (180 lb)        Physical Exam  Constitutional:  Appears well-developed and well-nourished. Cooperative.   HENT:    Fluid behind right T.M. No erythema, landmarks visible   Head:    Atraumatic.   Mouth/Throat:   Oropharynx is without erythema or exudate and mucous     membranes are moist.   Eyes:    Conjunctivae normal and EOM are normal. Few beats of horizontal nystagmus most prominent with right upward gaze.      Pupils are equal, round, and reactive to light.   Neck:    Normal range of motion. Neck supple.   Cardiovascular:  Normal rate, regular rhythm, normal heart sounds and radial and    dorsalis pedis pulses are 2+ and symmetric.    Pulmonary/Chest:  Effort normal and breath sounds normal.   Abdominal:   Soft. Bowel sounds are normal.      No splenomegaly or hepatomegaly. No tenderness. No rebound.   Musculoskeletal:  Normal range of motion. No edema and no tenderness.   Neurological:  Alert. Normal strength. No cranial nerve deficit. 5/5 strength to upper and lower extremities bilaterally. Normal finger nose finger. Fluent speech. " Sensation to light touch in tact in face and all four extremities. Gait in tact.   Skin:    Skin is warm and dry.   Psychiatric:   Normal mood and affect.        Emergency Department Course     ECG:  Indication: Fall   Completed at 0138.  Read at 0140.   Sinus bradycardia.   Otherwise normal ECG.   Rate 55 bpm. ND interval 140. QRS duration 96. QT/QTc 418/399. P-R-T axes 66 67 60.     Laboratory:  Laboratory findings were communicated with the patient who voiced understanding of the findings.    CBC: WNL. (WBC 6.0, HGB 15.0, )    CMP: Glucose 130 (H), Protein total 6.6 (L),  (H),  (H), o/w WNL. (Creatinine 0.86)   Troponin (Collected 0019): <0.015    Interventions:  0010 Antivert 25mg PO  0032 NS 1,000mL IV     Emergency Department Course:  Nursing notes and vitals reviewed.  I performed an exam of the patient as documented above.   IV was inserted and blood was drawn for laboratory testing, results above.   EKG obtained in the ED, see results above.    I discussed the treatment plan with the patient. They expressed understanding of this plan and consented to discharge. They will be discharged home with instructions for care and follow up. In addition, the patient will return to the emergency department if their symptoms persist, worsen, if new symptoms arise or if there is any concern.  All questions were answered.   I personally reviewed the laboratory results with the Patient and answered all related questions prior to discharge.    Impression & Plan      Medical Decision Making:  Jaun Lobo is a 42 year old male who presents after na episode of dizziness and near syncope that occurred while he was urinating. He said he was watching a movie and walked to the bathroom. While urinating he began to feel lightheaded with a room spinning sensation and fell forward, hitting his forehead on the wall. On initial exam he has a few beats of horizontal nystagmus and fluid behind his right TM. There  are no other focal neurologic deficit. His symptoms seem consistent with a peripheral vertigo. 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 He discontinued the Antabuse a few weeks ago and has remained sober. Laboratory testing today looks normal aside from his LFTs but these show improvement from last week. CBC with diff looks normal and troponin looks normal. His ECG looks normal with regular intervals and he remained in sinus rhythm on the monitor.     I considered near syncope as a source of the patient's symptoms. There was no chest pain, palpitations or family history of sudden death and no ECG abnormalities seen.    With peripheral symptoms and no focal neurologic deficits I did not think there was a role for emergent brain imaging. The patient's dizziness symptoms completely resolved after a dose of oral meclizine. He still has subtle nystagmus with right upward gaze but it has also improved following meclizine.    I think the patient is safe for discharge. I have asked him to follow up with PCP for recheck in the next week if not improving. He was given general discharge precautions for near syncope and vertigo. He will return to the ED for concerning symptoms. He left in good condition.     Diagnosis:    ICD-10-CM   1. Dizziness R42   2. Near syncope R55      Disposition:   Discharged to home with the below prescription      Discharge Medications:  New Prescriptions    MECLIZINE (ANTIVERT) 25 MG TABLET    Take 1 tablet (25 mg) by mouth 4 times daily as needed for dizziness       Scribe Disclosure:  Mike MARIE, am serving as a scribe at 12:01 AM on 6/25/2017 to document services personally performed by Demetris Corbett MD, based on my observations and the provider's statements to me.     Mike Jeff  6/24/2017    EMERGENCY DEPARTMENT       Demetris Corbett MD  06/26/17 [id number]

## 2017-06-25 NOTE — DISCHARGE INSTRUCTIONS
Understanding Dizziness, Balance Problems, and Fainting     The eyes, inner ear, joints, and muscles send signals to the brain to achieve balance.     Balance is a group effort of the eyes, inner ear, joints, and muscles. They each send signals to the brain about body position and head movement. Then the brain uses this information to achieve balance. When the brain receives conflicting signals, or when there is a problem with blood flow, dizziness or fainting can happen.  Vertigo  Vertigo is the feeling of spinning. It may happen if the brain receives conflicting balance signals. Vertigo is often caused by a problem in the inner ear. Problems include changes in inner ear structures, infection, swelling, or excess fluid. Sometimes vertigo is due to a brain problem, such as migraine.   Dysequilibrium  Dysequilibrium is the feeling of imbalance without a sense of spinning. It may happen if the signal path between the body and brain is disrupted. There are many causes of dysequilibrium, including diabetes, anemia, head injury, and aging.  Syncope  Syncope is losing consciousness or fainting. The brain needs oxygen-rich blood to function. The heart pumps that blood to the brain. If there is a problem with the heart, blood flow (such as low blood pressure), or blood vessels, faintness may happen.  Date Last Reviewed: 10/6/2015    4482-7006 [employer]. [address] [place] [address]. All rights reserved. This information is not intended as a substitute for professional medical care. Always follow your healthcare professional's instructions.          Inner Ear Problems: Causes of Dizziness (Vertigo)       Benign positional vertigo (BPV)  This is the most common cause of vertigo. BPV is also called benign positional paroxysmal vertigo (BPPV). It happens when crystals in the ear canals shift into the wrong place. Vertigo usually occurs when you move your head in a certain way. This can happen when  turning in bed, bending, or looking up. Because BPV comes on quickly, you should think about if you are safe to drive or do other tasks that need your full attention.  BPV:    Causes vertigo that last for seconds. Vertigo can occur several times a day, depending on body position.    Doesn t cause hearing loss    Often goes away on its own. But it but may go away sooner with treatment.  Infection or inflammation  Sometimes the semicircular canals swell and send incorrect balance signals. This problem may be caused by a viral infection. Depending on the cause, your hearing can be affected (labyrinthitis). Or your hearing can remain normal (neuronitis).  Infection or inflammation:    Causes vertigo that lasts for hours or days. The first episode is usually the worst.    Can cause hearing loss    Often goes away on its own. But it may go away sooner with treatment.  You may need vestibular rehabilitation if you have balance problems that don't go away.  Meniere s disease  This condition is uncommon. It happens when there is too much fluid in the ear canals. This causes increased pressure and swelling. It affects balance and hearing signals.  Meniere s disease may:    Cause vertigo that last for hours    Cause hearing problems that come and go. The problems are usually in one ear and get worse over time.    Cause buzzing or ringing in the ears (tinnitus)    Cause a feeling of fullness or pressure in the ear    Cause any of these symptoms: vertigo, hearing loss, tinnitus, or ear fullness to last a lifetime  Date Last Reviewed: 11/1/2016 2000-2017 [employer] [address] [place] [address], [place] [address]. All rights reserved. This information is not intended as a substitute for professional medical care. Always follow your healthcare professional's instructions.

## 2017-06-25 NOTE — ED NOTES
Patient reports he went to bathroom, became dizzy & hit head on wall.  Denies fall.  Ate a lot of carbohydrates & sugar today.  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

## 2017-07-10 DIAGNOSIS — R79.89 ELEVATED LFTS: ICD-10-CM

## 2017-07-10 PROCEDURE — 80076 HEPATIC FUNCTION PANEL: CPT | Performed by: FAMILY MEDICINE

## 2017-07-10 PROCEDURE — 36415 COLL VENOUS BLD VENIPUNCTURE: CPT | Performed by: FAMILY MEDICINE

## 2017-07-11 ENCOUNTER — TELEPHONE (OUTPATIENT)
Dept: FAMILY MEDICINE | Facility: CLINIC | Age: 42
End: 2017-07-11

## 2017-07-11 DIAGNOSIS — R79.89 ELEVATED LFTS: Primary | ICD-10-CM

## 2017-07-11 LAB
ALBUMIN SERPL-MCNC: 4 G/DL (ref 3.4–5)
ALP SERPL-CCNC: 75 U/L (ref 40–150)
ALT SERPL W P-5'-P-CCNC: 195 U/L (ref 0–70)
AST SERPL W P-5'-P-CCNC: 89 U/L (ref 0–45)
BILIRUB DIRECT SERPL-MCNC: 0.2 MG/DL (ref 0–0.2)
BILIRUB SERPL-MCNC: 1 MG/DL (ref 0.2–1.3)
PROT SERPL-MCNC: 6.7 G/DL (ref 6.8–8.8)

## 2017-07-11 NOTE — TELEPHONE ENCOUNTER
Patient is anxious to get his hepatic panel results back as soon as possible.  Please call patient when they come in.  OK to leave message on voicemail.

## 2017-07-11 NOTE — TELEPHONE ENCOUNTER
Patient given results and message per Dr Bush's notes  Lab only appointment scheduled for 1 month from today  Orders pended.  Yaima Richey RN

## 2017-07-20 DIAGNOSIS — R94.5 ABNORMAL RESULTS OF LIVER FUNCTION STUDIES: Primary | ICD-10-CM

## 2017-08-07 ENCOUNTER — TELEPHONE (OUTPATIENT)
Dept: FAMILY MEDICINE | Facility: CLINIC | Age: 42
End: 2017-08-07

## 2017-08-07 NOTE — TELEPHONE ENCOUNTER
Live call.  --Patient calling to schedule an appointment to discuss alternatives to antabuse.  --Patient did not want triage.  --I scheduled appointment with Aurea because patient was busy during [employer]s openings.  --I scheduled lab only appointment for him.    Nathalie Griffin RN

## 2017-08-08 DIAGNOSIS — R94.5 ABNORMAL RESULTS OF LIVER FUNCTION STUDIES: ICD-10-CM

## 2017-08-08 LAB
ALBUMIN SERPL-MCNC: 3.8 G/DL (ref 3.4–5)
ALP SERPL-CCNC: 59 U/L (ref 40–150)
ALT SERPL W P-5'-P-CCNC: 54 U/L (ref 0–70)
ANION GAP SERPL CALCULATED.3IONS-SCNC: 9 MMOL/L (ref 3–14)
AST SERPL W P-5'-P-CCNC: 33 U/L (ref 0–45)
BILIRUB DIRECT SERPL-MCNC: 0.2 MG/DL (ref 0–0.2)
BILIRUB SERPL-MCNC: 0.7 MG/DL (ref 0.2–1.3)
BUN SERPL-MCNC: 11 MG/DL (ref 7–30)
CALCIUM SERPL-MCNC: 8.8 MG/DL (ref 8.5–10.1)
CHLORIDE SERPL-SCNC: 102 MMOL/L (ref 94–109)
CO2 SERPL-SCNC: 26 MMOL/L (ref 20–32)
CREAT SERPL-MCNC: 0.92 MG/DL (ref 0.66–1.25)
ERYTHROCYTE [DISTWIDTH] IN BLOOD BY AUTOMATED COUNT: 13.1 % (ref 10–15)
GFR SERPL CREATININE-BSD FRML MDRD: 90 ML/MIN/1.7M2
GLUCOSE SERPL-MCNC: 114 MG/DL (ref 70–99)
HCT VFR BLD AUTO: 45.1 % (ref 40–53)
HGB BLD-MCNC: 15.9 G/DL (ref 13.3–17.7)
INR PPP: 1.02 (ref 0.86–1.14)
MCH RBC QN AUTO: 31.5 PG (ref 26.5–33)
MCHC RBC AUTO-ENTMCNC: 35.3 G/DL (ref 31.5–36.5)
MCV RBC AUTO: 89 FL (ref 78–100)
PLATELET # BLD AUTO: 231 10E9/L (ref 150–450)
POTASSIUM SERPL-SCNC: 4.3 MMOL/L (ref 3.4–5.3)
PROT SERPL-MCNC: 6.9 G/DL (ref 6.8–8.8)
RBC # BLD AUTO: 5.05 10E12/L (ref 4.4–5.9)
SODIUM SERPL-SCNC: 137 MMOL/L (ref 133–144)
WBC # BLD AUTO: 4.7 10E9/L (ref 4–11)

## 2017-08-08 PROCEDURE — 85027 COMPLETE CBC AUTOMATED: CPT | Performed by: FAMILY MEDICINE

## 2017-08-08 PROCEDURE — 80076 HEPATIC FUNCTION PANEL: CPT | Performed by: FAMILY MEDICINE

## 2017-08-08 PROCEDURE — 80048 BASIC METABOLIC PNL TOTAL CA: CPT | Performed by: FAMILY MEDICINE

## 2017-08-08 PROCEDURE — 85610 PROTHROMBIN TIME: CPT | Performed by: FAMILY MEDICINE

## 2017-08-08 PROCEDURE — 36415 COLL VENOUS BLD VENIPUNCTURE: CPT | Performed by: FAMILY MEDICINE

## 2017-08-11 ENCOUNTER — OFFICE VISIT (OUTPATIENT)
Dept: FAMILY MEDICINE | Facility: CLINIC | Age: 42
End: 2017-08-11
Payer: COMMERCIAL

## 2017-08-11 VITALS
BODY MASS INDEX: 24.39 KG/M2 | HEART RATE: 96 BPM | SYSTOLIC BLOOD PRESSURE: 134 MMHG | DIASTOLIC BLOOD PRESSURE: 84 MMHG | OXYGEN SATURATION: 96 % | WEIGHT: 190 LBS | TEMPERATURE: 96.1 F

## 2017-08-11 DIAGNOSIS — F10.20 UNCOMPLICATED ALCOHOL DEPENDENCE (H): Primary | ICD-10-CM

## 2017-08-11 PROCEDURE — 99213 OFFICE O/P EST LOW 20 MIN: CPT | Performed by: FAMILY MEDICINE

## 2017-08-11 RX ORDER — ACAMPROSATE CALCIUM 333 MG/1
666 TABLET, DELAYED RELEASE ORAL 3 TIMES DAILY
Qty: 90 TABLET | Refills: 3 | Status: SHIPPED | OUTPATIENT
Start: 2017-08-11 | End: 2017-08-24 | Stop reason: SINTOL

## 2017-08-11 NOTE — PROGRESS NOTES
SUBJECTIVE:                                                    Jaun Lobo is a 42 year old male who presents to clinic today for the following health issues:      Pt comes in clinic today to discuss replacing antabuse for campral. He states that he had to stop taking the antabuse because of liver damage, recently very elevated LFTs. He's tried antabuse, which made him very agitated, and had significant road rage, which is unusual for him.    He's been to  treatment [facility]  2 1/2 months spring 2016. He's only been able to stay sober for a few months after treatment. He does participate in AA meetings, sees a therapist regularly.     When he drinks he drinks 10-15 at a time, usually at night when home alone. Significant history of alcoholism on his paternal side. He started drinking pretty regularly in high school.     He has had a new job as  for [employer] for the past 3 months, has been stressful, has tried yoga, meditation, hypnotherapy., but finds he turns to alcohol.  He has a long term relationship but he knows its hard on his girlfriend. No legal problems.    Problem list and histories reviewed & adjusted, as indicated.  Additional history: as documented    Patient Active Problem List   Diagnosis     Allergic rhinitis     Anxiety state     Headache     Ankylosing spondylitis (H)     DDD (degenerative disc disease), lumbar     Low back pain     Herpes simplex esophagitis     CARDIOVASCULAR SCREENING; LDL GOAL LESS THAN 160     Insomnia     GERD (gastroesophageal reflux disease)     Esophagitis     Alcohol dependence in remission (HCC)     Uncomplicated alcohol dependence (H)     Past Surgical History:   Procedure Laterality Date     C NONSPECIFIC PROCEDURE      nasal fx; left clavic fx     C NONSPECIFIC PROCEDURE  12/04    normal colonoscopy; rpt age 49     CL AFF SURGICAL PATHOLOGY       ESOPHAGOSCOPY, GASTROSCOPY, DUODENOSCOPY (EGD), COMBINED  5/9/2014    Procedure: COMBINED  ESOPHAGOSCOPY, GASTROSCOPY, DUODENOSCOPY (EGD), BIOPSY SINGLE OR MULTIPLE;  Surgeon: Yanely Macias MD;  Location: [facility]     wisdom teeth         Social History   Substance Use Topics     Smoking status: Never Smoker     Smokeless tobacco: Never Used     Alcohol use No      Comment: 40 drinks a week , quit drinking 6 weeks ago      Family History   Problem Relation Age of Onset     CEREBROVASCULAR DISEASE Maternal Grandfather      had strokes in his 80's     GASTROINTESTINAL DISEASE Mother      ulcerative colitis     Anxiety Disorder Mother      CANCER Other      esophageal cancer, cousin     C.A.D. No family hx of      DIABETES No family hx of      Hypertension No family hx of      Breast Cancer No family hx of      Cancer - colorectal No family hx of      Prostate Cancer No family hx of      Unknown/Adopted No family hx of      Depression No family hx of      Schizophrenia No family hx of      Bipolar Disorder No family hx of      Suicide No family hx of      Substance Abuse No family hx of      Dementia No family hx of      Alva Disease No family hx of      Parkinsonism No family hx of      Autism Spectrum Disorder No family hx of      Intellectual Disability (Mental Retardation) No family hx of      MENTAL ILLNESS No family hx of          Current Outpatient Prescriptions   Medication Sig Dispense Refill     acamprosate (CAMPRAL) 333 MG EC tablet Take 2 tablets (666 mg) by mouth 3 times daily 90 tablet 3     meclizine (ANTIVERT) 25 MG tablet Take 1 tablet (25 mg) by mouth 4 times daily as needed for dizziness 30 tablet 0     temazepam (RESTORIL) 7.5 MG capsule Take 1 capsule (7.5 mg) by mouth nightly as needed for sleep 30 capsule 2     albuterol (PROAIR HFA, PROVENTIL HFA, VENTOLIN HFA) 108 (90 BASE) MCG/ACT inhaler Inhale 2 puffs into the lungs every 4 hours as needed for shortness of breath / dyspnea or wheezing       melatonin 3 MG tablet Take 3 mg by mouth nightly as needed.        Allergies   Allergen Reactions     Animal Dander      Dust Mite Extract      No Known Drug Allergies      Ragweeds      goldenrod     Seasonal Allergies      Recent Labs   Lab Test  08/08/17   0829  07/10/17   0808  06/25/17   0019   04/26/17   1547   02/18/14   1022  12/04/12   1047   LDL   --    --    --    --   89   --   83  69   HDL   --    --    --    --   38*   --   61  58   TRIG   --    --    --    --   122   --   51  83   ALT  54  195*  479*   < >  164*   < >   --    --    CR  0.92   --   0.86   --   0.90   --    --    --    GFRESTIMATED  90   --   >90  Non  GFR Calc     --   >90  Non  GFR Calc     --    --    --    GFRESTBLACK  >90   GFR Calc     --   >90   GFR Calc     --   >90   GFR Calc     --    --    --    POTASSIUM  4.3   --   3.7   --   4.4   --    --    --    TSH   --    --    --    --    --    --   1.33   --     < > = values in this interval not displayed.      BP Readings from Last 3 Encounters:   08/11/17 134/84   06/25/17 109/62   05/31/17 130/79    Wt Readings from Last 3 Encounters:   08/11/17 190 lb (86.2 kg)   06/24/17 180 lb (81.6 kg)   05/31/17 180 lb 12 oz (82 kg)          Reviewed and updated as needed this visit by clinical staffTobacco  Allergies  Meds  Med Hx  Surg Hx  Fam Hx  Soc Hx      Reviewed and updated as needed this visit by Provider       There are no preventive care reminders to display for this patient.   ROS:  Constitutional, HEENT, cardiovascular, pulmonary, GI, , musculoskeletal, neuro, skin, endocrine and psych systems are negative, except as otherwise noted.      OBJECTIVE:   /84  Pulse 96  Temp 96.1  F (35.6  C) (Tympanic)  Wt 190 lb (86.2 kg)  SpO2 96%  BMI 24.39 kg/m2  Body mass index is 24.39 kg/(m^2).  GENERAL: healthy, alert and no distress  NECK: no adenopathy, no asymmetry, masses, or scars and thyroid normal to palpation  RESP: lungs clear to auscultation -  no rales, rhonchi or wheezes  CV: regular rate and rhythm, normal S1 S2, no S3 or S4, no murmur, click or rub, no peripheral edema and peripheral pulses strong  ABDOMEN: soft, nontender, no hepatosplenomegaly, no masses and bowel sounds normal  MS: no gross musculoskeletal defects noted, no edema  NEURO: Normal strength and tone, mentation intact and speech normal  PSYCH: mentation appears normal, affect normal/bright    Diagnostic Test Results:  none     ASSESSMENT/PLAN:     1. Uncomplicated alcohol dependence (H)  Persistent, severe  Discussed ongoing group support, options for CD treatment, provided info from [facility] alphonse Molina always an option again, as well as [place] options  Return to clinic 1 mo for fu  - acamprosate (CAMPRAL) 333 MG EC tablet; Take 2 tablets (666 mg) by mouth 3 times daily  Dispense: 90 tablet; Refill: 3      Ioana Villar MD  [facility]

## 2017-08-11 NOTE — NURSING NOTE
"Chief Complaint   Patient presents with     Medication Request       Initial /84  Pulse 96  Temp 96.1  F (35.6  C) (Tympanic)  Wt 190 lb (86.2 kg)  SpO2 96%  BMI 24.39 kg/m2 Estimated body mass index is 24.39 kg/(m^2) as calculated from the following:    Height as of 6/24/17: 6' 2\" (1.88 m).    Weight as of this encounter: 190 lb (86.2 kg).  Medication Reconciliation: complete     Grace Elias MA    "

## 2017-08-11 NOTE — MR AVS SNAPSHOT
"              After Visit Summary   2017    Jaun Lobo    MRN: [id number]           Patient Information     Date Of Birth          1975        Visit Information        Provider Department      2017 8:40 AM Ioana Villar MD [facility]        Today's Diagnoses     Uncomplicated alcohol dependence (H)    -  1       Follow-ups after your visit        Who to contact     If you have questions or need follow up information about today's clinic visit or your schedule please contact [facility] directly at [phone].  Normal or non-critical lab and imaging results will be communicated to you by [employer], letter or phone within 4 business days after the clinic has received the results. If you do not hear from us within 7 days, please contact the clinic through [employer] or phone. If you have a critical or abnormal lab result, we will notify you by phone as soon as possible.  Submit refill requests through [employer] or call your pharmacy and they will forward the refill request to us. Please allow 3 business days for your refill to be completed.          Additional Information About Your Visit        MyChart Information     [employer] lets you send messages to your doctor, view your test results, renew your prescriptions, schedule appointments and more. To sign up, go to [email or web address] . Click on \"Log in\" on the left side of the screen, which will take you to the Welcome page. Then click on \"Sign up Now\" on the right side of the page.     You will be asked to enter the access code listed below, as well as some personal information. Please follow the directions to create your username and password.     Your access code is: [email or web address]  Expires: 2017  3:17 AM     Your access code will  in 90 days. If you need help or a new code, please call your [facility] or [phone].        Care EveryWhere ID     This is your Care EveryWhere ID. This could be " used by other organizations to access your [place] medical records  [id number]        Your Vitals Were     Pulse Temperature Pulse Oximetry BMI (Body Mass Index)          96 96.1  F (35.6  C) (Tympanic) 96% 24.39 kg/m2         Blood Pressure from Last 3 Encounters:   08/11/17 134/84   06/25/17 109/62   05/31/17 130/79    Weight from Last 3 Encounters:   08/11/17 190 lb (86.2 kg)   06/24/17 180 lb (81.6 kg)   05/31/17 180 lb 12 oz (82 kg)              Today, you had the following     No orders found for display         Today's Medication Changes          These changes are accurate as of: 8/11/17  9:28 AM.  If you have any questions, ask your nurse or doctor.               Start taking these medicines.        Dose/Directions    acamprosate 333 MG EC tablet   Commonly known as:  CAMPRAL   Used for:  Uncomplicated alcohol dependence (H)   Started by:  Ioana Villar MD        Dose:  666 mg   Take 2 tablets (666 mg) by mouth 3 times daily   Quantity:  90 tablet   Refills:  3            Where to get your medicines      These medications were sent to [facility] [place] [address]  [address] [facility] [address]     Phone:  [phone]     acamprosate 333 MG EC tablet                Primary Care Provider Office Phone # Fax #    Parker Komal Bush [phone]       [address] [facility] [address]        Equal Access to Services     DAISY ZARAGOZA AH: Hadii shahid ku hadasho Soomaali, waaxda luqadaha, qaybta kaalmada adeegyada, waxchandrakant jennyfer chairez . So [facility] [phone].    ATENCIÓN: Si habla español, tiene a bowman disposición servicios gratuitos de asistencia lingüística. Llame al [phone].    We comply with applicable federal civil rights laws and [place] laws. We do not discriminate on the basis of race, color, national origin, age, disability sex, sexual orientation or gender identity.            Thank you!     Thank you  for choosing [facility]  for your care. Our goal is always to provide you with excellent care. Hearing back from our patients is one way we can continue to improve our services. Please take a few minutes to complete the written survey that you may receive in the mail after your visit with us. Thank you!             Your Updated Medication List - Protect others around you: Learn how to safely use, store and throw away your medicines at www.disposemymeds.org.          This list is accurate as of: 8/11/17  9:28 AM.  Always use your most recent med list.                   Brand Name Dispense Instructions for use Diagnosis    acamprosate 333 MG EC tablet    CAMPRAL    90 tablet    Take 2 tablets (666 mg) by mouth 3 times daily    Uncomplicated alcohol dependence (H)       albuterol 108 (90 BASE) MCG/ACT Inhaler    PROAIR HFA/PROVENTIL HFA/VENTOLIN HFA     Inhale 2 puffs into the lungs every 4 hours as needed for shortness of breath / dyspnea or wheezing        meclizine 25 MG tablet    ANTIVERT    30 tablet    Take 1 tablet (25 mg) by mouth 4 times daily as needed for dizziness        melatonin 3 MG tablet      Take 3 mg by mouth nightly as needed.    Insomnia       temazepam 7.5 MG capsule    RESTORIL    30 capsule    Take 1 capsule (7.5 mg) by mouth nightly as needed for sleep    Insomnia, unspecified

## 2017-08-14 ENCOUNTER — TELEPHONE (OUTPATIENT)
Dept: FAMILY MEDICINE | Facility: CLINIC | Age: 42
End: 2017-08-14

## 2017-08-24 ENCOUNTER — TELEPHONE (OUTPATIENT)
Dept: FAMILY MEDICINE | Facility: CLINIC | Age: 42
End: 2017-08-24

## 2017-08-24 DIAGNOSIS — F10.20 UNCOMPLICATED ALCOHOL DEPENDENCE (H): Primary | ICD-10-CM

## 2017-08-24 NOTE — TELEPHONE ENCOUNTER
"  --Patient calling to say he had a bad reaction to Campral.  --He started the Campral this week and he described \"immediate side effects\" to be like a \"bad acid trip.\"  \"Buzzing head.\" \"Vacillated between anger and euphoria and confusion.\"  --He says he was feeling these side effects last night and so did not take his dose and the morning he is feeling \"much better\" - \"90 % back to himself.\"  --He had tried antabuse for his etoh problem but that caused hepatitis.    --He scheduled an appointment with  today but feels he has exhausted his alternatives and wonders if what he really needs is to be seen by a psychiatrist that specializes in substance abuse and mood disorder.  --He says he would like to be able to see someone within 1 or 2 weeks for help with this problem.  He is afraid if he waits longer than this he will get into trouble again.    --He wants to thank  for helping him last minute at the last appointment..  He is grateful.    "

## 2017-08-24 NOTE — TELEPHONE ENCOUNTER
Side effects on Campral and Antabuse, and given hx of hepatitis with Antabuse, I'm reluctant for him to try naltrexone.  I do think an addiction specialist is a good idea, but my best guess is that it would take longer than 2 weeks to get an appointment as a new patient.   CD treatment inpatient or outpatient is always an option, see phone number below. I'll put referral in for that.  [place] counseling number to schedule: [facility] [phone]    I 'd recommend he schedule with both, but also that he meet with Mega Uribe as well, who can't prescribe medications, but could recommend appropriate referrals.    Please ask him to schedule with Mega and give him the phone number, thank you!    *[place] CD Intake  (type CD intake in the search field)  [email or web address]  [phone] outpatient  inpatient services [phone]  or [phone] To arrange an appointment with CD counselor               Sincerely,  Dr. Ioana Villar MD  8/24/2017

## 2017-08-24 NOTE — TELEPHONE ENCOUNTER
I called Jaun and left Dr.Sparks morataya for him on his cell as he requested.  I asked him to call back if he needs any help or questions.    I called Jaun and he thanks  for the information and guidance.  He will call [facility] and may also call Mega Uribe for help.  He did not want to keep his appointment today with Aurea.      Nathalie Griffin RN

## 2017-09-12 ENCOUNTER — TELEPHONE (OUTPATIENT)
Dept: FAMILY MEDICINE | Facility: CLINIC | Age: 42
End: 2017-09-12

## 2017-09-12 DIAGNOSIS — Z51.81 ENCOUNTER FOR THERAPEUTIC DRUG MONITORING: ICD-10-CM

## 2017-09-12 DIAGNOSIS — Z11.3 SCREEN FOR STD (SEXUALLY TRANSMITTED DISEASE): Primary | ICD-10-CM

## 2017-09-12 NOTE — TELEPHONE ENCOUNTER
"Patient labs look good from 8/8/2017. Patient thought he was supposed to f/u in one month for repeat labs. Note from office visit on 8/11 states, \"Discussed ongoing group support, options for CD treatment, provided info from [facility], certainly [facility] always an option again, as well as [place] options  Return to clinic 1 mo for follow up\"    Is the follow up for labs?    Please advise.   "

## 2017-09-12 NOTE — TELEPHONE ENCOUNTER
Reason for Call: Request for an order or referral:    Order or referral being requested: Pt is calling req a lab order to check his kidney levels-renal. Pt is on acamprosate (CAMPRAL) 333 MG EC tablet and was last seen on 8/11 and thought he was suppose to come back in a month for labs but no orders are in. Pt is wanting to come in tomorrow around 11/1130am. Please call pt when labs are in.    Date needed: as soon as possible    Has the patient been seen by the PCP for this problem? YES    Additional comments: NA    Phone number Patient can be reached at:  Home number on file [phone] (home)    Best Time:  ASAP    Can we leave a detailed message on this number?  YES    Call taken on 9/12/2017 at 3:52 PM by Susan Jackson

## 2017-09-13 ENCOUNTER — OFFICE VISIT (OUTPATIENT)
Dept: FAMILY MEDICINE | Facility: CLINIC | Age: 42
End: 2017-09-13
Payer: COMMERCIAL

## 2017-09-13 VITALS
HEART RATE: 70 BPM | TEMPERATURE: 97.9 F | BODY MASS INDEX: 24.36 KG/M2 | SYSTOLIC BLOOD PRESSURE: 124 MMHG | WEIGHT: 189.75 LBS | OXYGEN SATURATION: 98 % | DIASTOLIC BLOOD PRESSURE: 81 MMHG

## 2017-09-13 DIAGNOSIS — Z23 NEED FOR PROPHYLACTIC VACCINATION AND INOCULATION AGAINST INFLUENZA: ICD-10-CM

## 2017-09-13 DIAGNOSIS — Z11.3 SCREENING FOR STDS (SEXUALLY TRANSMITTED DISEASES): Primary | ICD-10-CM

## 2017-09-13 LAB
HCV AB SERPL QL IA: NONREACTIVE
HIV 1+2 AB+HIV1 P24 AG SERPL QL IA: NONREACTIVE
T PALLIDUM IGG+IGM SER QL: NEGATIVE

## 2017-09-13 PROCEDURE — 99213 OFFICE O/P EST LOW 20 MIN: CPT | Mod: 25 | Performed by: FAMILY MEDICINE

## 2017-09-13 PROCEDURE — 87591 N.GONORRHOEAE DNA AMP PROB: CPT | Performed by: FAMILY MEDICINE

## 2017-09-13 PROCEDURE — 90471 IMMUNIZATION ADMIN: CPT | Performed by: FAMILY MEDICINE

## 2017-09-13 PROCEDURE — 36415 COLL VENOUS BLD VENIPUNCTURE: CPT | Performed by: FAMILY MEDICINE

## 2017-09-13 PROCEDURE — 87491 CHLMYD TRACH DNA AMP PROBE: CPT | Performed by: FAMILY MEDICINE

## 2017-09-13 PROCEDURE — 87389 HIV-1 AG W/HIV-1&-2 AB AG IA: CPT | Performed by: FAMILY MEDICINE

## 2017-09-13 PROCEDURE — 86780 TREPONEMA PALLIDUM: CPT | Performed by: FAMILY MEDICINE

## 2017-09-13 PROCEDURE — 90686 IIV4 VACC NO PRSV 0.5 ML IM: CPT | Performed by: FAMILY MEDICINE

## 2017-09-13 PROCEDURE — 86803 HEPATITIS C AB TEST: CPT | Performed by: FAMILY MEDICINE

## 2017-09-13 NOTE — TELEPHONE ENCOUNTER
Spoke with pt regarding the STD testing, pt stated that he wasn't as worried about the STD testing as he was the labs for his Kidney levels. He stated that he came in today and seen Dr Bush for STD testing but still is unsure about the labs while on the Campral.     Dr Villar please advise what Jaun next steps are as he is unsure. Did not have a clear understanding if he is to return in one month for follow up labs.     Okay to leave a detailed message.    Thanks    Grace Elias MA

## 2017-09-13 NOTE — PROGRESS NOTES
Injectable Influenza Immunization Documentation    1.  Are you sick today? (Fever of 100.5 or higher on the day of the clinic)   No    2.  Have you ever had Guillain-[place] Syndrome within 6 weeks of an influenza vaccionation?  No    3. Do you have a life-threatening allergy to eggs?  No    4. Do you have a life-threatening allergy to a component of the vaccine? May include antibiotics, gelatin or latex.  No     5. Have you ever had a reaction to a dose of flu vaccine that needed immediate medical attention?  No     Form completed by Niharika Barry, [facility]

## 2017-09-13 NOTE — PROGRESS NOTES
"  SUBJECTIVE:   Jaun Lobo is a 42 year old male who presents to clinic today for the following health issues:      Patient would like STD testing done,     Campral- would like 180 tab a month instead of 90 tabs    Not using alcohol.     Couples of months ago - \"reckless behavior\".  Did not use condom.   Current partner is describing some itching and some abdominal pain.     On campral.     Problem list and histories reviewed & adjusted, as indicated.  Additional history: as documented    Labs reviewed in EPIC    Reviewed and updated as needed this visit by clinical staff  Tobacco  Allergies  Meds  Med Hx  Surg Hx  Fam Hx  Soc Hx      Reviewed and updated as needed this visit by Provider           Social History     Social History     Marital status: Single     Spouse name: N/A     Number of children: 0     Years of education: N/A     Occupational History       [employer]     Social History Main Topics     Smoking status: Never Smoker     Smokeless tobacco: Never Used     Alcohol use No      Comment: 40 drinks a week , quit drinking 6 weeks ago      Drug use: No     Sexual activity: Yes     Partners: Female     Other Topics Concern     Parent/Sibling W/ Cabg, Mi Or Angioplasty Before 65f 55m? No     Social History Narrative    Balanced Diet - Yes    Osteoporosis Preventative measures-  Dairy servings per day: 2 and Weight bearing exercise    Regular Exercise -  Yes Describe Runs every AM for 15 minutes.    Dental Exam up - YES - Date: 05/2003    Seatbelts used - Yes    Self Testicular Exam -  Yes    Abuse: Current or Past (Physical, Sexual or Emotional)- No    Do you have any concerns about STD's -  No    Do you feel safe in your environment - Yes    Guns stored in the home - No    Sunscreen used - No    Lipids - YES - Date: Has been several years.    Glucose -  YES - Date: Has been several years.    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                         Allergies   Allergen Reactions     Antabuse [Disulfiram] Other (See Comments)     hepatitis     Campral [Acamprosate] Other (See Comments)     Mood changes     Animal Dander      Dust Mite Extract      No Known Drug Allergies      Ragweeds      goldenrod     Seasonal Allergies      Patient Active Problem List   Diagnosis     Allergic rhinitis     Anxiety state     Headache     Ankylosing spondylitis (H)     DDD (degenerative disc disease), lumbar     Low back pain     Herpes simplex esophagitis     CARDIOVASCULAR SCREENING; LDL GOAL LESS THAN 160     Insomnia     GERD (gastroesophageal reflux disease)     Esophagitis     Alcohol dependence in remission (HCC)     Uncomplicated alcohol dependence (H)     Reviewed medications, social history and  past medical and surgical history.    Review of system: for general, respiratory, CVS, GI and psychiatry negative except for noted above.     EXAM:  /81 (Cuff Size: Adult Large)  Pulse 70  Temp 97.9  F (36.6  C) (Oral)  Wt 189 lb 12 oz (86.1 kg)  SpO2 98%  BMI 24.36 kg/m2  Constitutional: healthy, alert and no distress   Psychiatric: mentation appears normal and affect normal/bright    ASSESSMENT / PLAN:  (Z11.3) Screening for STDs (sexually transmitted diseases)  (primary encounter diagnosis)  Comment: no specific concern.   Plan: NEISSERIA GONORRHOEA PCR, CHLAMYDIA TRACHOMATIS        PCR, HIV Antigen Antibody Combo, Anti         Treponema, Hepatitis C Screen Reflex to HCV RNA        Quant and Genotype             (Z23) Need for prophylactic vaccination and inoculation against influenza  Comment:    Plan: FLU VAC, SPLIT VIRUS IM > 3 YO (QUADRIVALENT)         [80375], Vaccine Administration, Initial         [16339]

## 2017-09-13 NOTE — MR AVS SNAPSHOT
After Visit Summary   9/13/2017    Jaun Lobo    MRN: [id number]           Patient Information     Date Of Birth          1975        Visit Information        Provider Department      9/13/2017 9:20 AM Parker Bush MD [facility]        Today's Diagnoses     Screening for STDs (sexually transmitted diseases)    -  1    Need for prophylactic vaccination and inoculation against influenza           Follow-ups after your visit        Who to contact     If you have questions or need follow up information about today's clinic visit or your schedule please contact [facility] directly at [phone].  Normal or non-critical lab and imaging results will be communicated to you by Welocalizehart, letter or phone within 4 business days after the clinic has received the results. If you do not hear from us within 7 days, please contact the clinic through Welocalizehart or phone. If you have a critical or abnormal lab result, we will notify you by phone as soon as possible.  Submit refill requests through [employer] or call your pharmacy and they will forward the refill request to us. Please allow 3 business days for your refill to be completed.          Additional Information About Your Visit        MyChart Information     [employer] gives you secure access to your electronic health record. If you see a primary care provider, you can also send messages to your care team and make appointments. If you have questions, please call your primary care clinic.  If you do not have a primary care provider, please call [phone] and they will assist you.        Care EveryWhere ID     This is your Care EveryWhere ID. This could be used by other organizations to access your [facility] medical records  [id number]        Your Vitals Were     Pulse Temperature Pulse Oximetry BMI (Body Mass Index)          70 97.9  F (36.6  C) (Oral) 98% 24.36 kg/m2         Blood Pressure from Last 3 Encounters:    09/13/17 124/81   08/11/17 134/84   06/25/17 109/62    Weight from Last 3 Encounters:   09/13/17 189 lb 12 oz (86.1 kg)   08/11/17 190 lb (86.2 kg)   06/24/17 180 lb (81.6 kg)              We Performed the Following     Anti Treponema     CHLAMYDIA TRACHOMATIS PCR     FLU VAC, SPLIT VIRUS IM > 3 YO (QUADRIVALENT) [27271]     Hepatitis C Screen Reflex to HCV RNA Quant and Genotype     HIV Antigen Antibody Combo     NEISSERIA GONORRHOEA PCR     Vaccine Administration, Initial [[id number]]        Primary Care Provider Office Phone # Fax #    Parker Komal Bush [phone] [address] [place], [place] [id number]        Equal Access to Services     DAISY ZARAGOZA : Mohsen stuart Soeugenio, waaxda luqadaha, qaybta kaalmada ademarielleyanakul, stacy chairez . So [facility] [phone].    ATENCIÓN: Si habla español, tiene a bowman disposición servicios gratuitos de asistencia lingüística. Llame al [phone].    We comply with applicable federal civil rights laws and [place] laws. We do not discriminate on the basis of race, color, national origin, age, disability sex, sexual orientation or gender identity.            Thank you!     Thank you for choosing [facility]  for your care. Our goal is always to provide you with excellent care. Hearing back from our patients is one way we can continue to improve our services. Please take a few minutes to complete the written survey that you may receive in the mail after your visit with us. Thank you!             Your Updated Medication List - Protect others around you: Learn how to safely use, store and throw away your medicines at www.disposemymeds.org.          This list is accurate as of: 9/13/17 11:59 PM.  Always use your most recent med list.                   Brand Name Dispense Instructions for use Diagnosis    albuterol 108 (90 BASE) MCG/ACT Inhaler    PROAIR HFA/PROVENTIL HFA/VENTOLIN HFA     Inhale 2 puffs into the  lungs every 4 hours as needed for shortness of breath / dyspnea or wheezing        CAMPRAL PO      Take by mouth 3 times daily        meclizine 25 MG tablet    ANTIVERT    30 tablet    Take 1 tablet (25 mg) by mouth 4 times daily as needed for dizziness        melatonin 3 MG tablet      Take 3 mg by mouth nightly as needed.    Insomnia       temazepam 7.5 MG capsule    RESTORIL    30 capsule    Take 1 capsule (7.5 mg) by mouth nightly as needed for sleep    Insomnia, unspecified

## 2017-09-13 NOTE — NURSING NOTE
"Chief Complaint   Patient presents with     STD     check up       Initial /81 (Cuff Size: Adult Large)  Pulse 70  Temp 97.9  F (36.6  C) (Oral)  Wt 189 lb 12 oz (86.1 kg)  SpO2 98%  BMI 24.36 kg/m2 Estimated body mass index is 24.36 kg/(m^2) as calculated from the following:    Height as of 6/24/17: 6' 2\" (1.88 m).    Weight as of this encounter: 189 lb 12 oz (86.1 kg).  Medication Reconciliation: complete     Niharika Barry, PANCHO      "

## 2017-09-13 NOTE — TELEPHONE ENCOUNTER
Pt called to check the status of his request. He would also like to take an STD exam if possible before 3pm. Please contact pt regarding this.

## 2017-09-14 LAB
C TRACH DNA SPEC QL NAA+PROBE: NEGATIVE
N GONORRHOEA DNA SPEC QL NAA+PROBE: NEGATIVE
SPECIMEN SOURCE: NORMAL
SPECIMEN SOURCE: NORMAL

## 2017-09-14 NOTE — PROGRESS NOTES
Jaun,    Chlamydia and gonorrhea screen was negative.    -Taisha Borrego CNP (covering for Dr. Bush who is currently out of the office)

## 2017-09-15 NOTE — TELEPHONE ENCOUNTER
Hi, I do recommend he get a blood draw to recheck kidney function, he can either schedule an appointment with me if he has questions or needs to check in, or just a lab appointment if he prefers, future labs have been ordered.    Sincerely,  Dr. Iaona Villar MD  9/15/2017

## 2017-09-15 NOTE — TELEPHONE ENCOUNTER
Lvm instructing patient to call clinic back to inform him of Dr. Villar's message and to schedule appt.     Thanks! Vonnie Tiwari RN

## 2017-09-21 DIAGNOSIS — Z51.81 ENCOUNTER FOR THERAPEUTIC DRUG MONITORING: ICD-10-CM

## 2017-09-21 DIAGNOSIS — R79.89 ELEVATED LFTS: ICD-10-CM

## 2017-09-21 PROCEDURE — 80048 BASIC METABOLIC PNL TOTAL CA: CPT | Performed by: FAMILY MEDICINE

## 2017-09-21 PROCEDURE — 80076 HEPATIC FUNCTION PANEL: CPT | Performed by: FAMILY MEDICINE

## 2017-09-21 PROCEDURE — 36415 COLL VENOUS BLD VENIPUNCTURE: CPT | Performed by: FAMILY MEDICINE

## 2017-09-22 LAB
ALBUMIN SERPL-MCNC: 4 G/DL (ref 3.4–5)
ALP SERPL-CCNC: 60 U/L (ref 40–150)
ALT SERPL W P-5'-P-CCNC: 48 U/L (ref 0–70)
ANION GAP SERPL CALCULATED.3IONS-SCNC: 9 MMOL/L (ref 3–14)
AST SERPL W P-5'-P-CCNC: 34 U/L (ref 0–45)
BILIRUB DIRECT SERPL-MCNC: 0.3 MG/DL (ref 0–0.2)
BILIRUB SERPL-MCNC: 1.1 MG/DL (ref 0.2–1.3)
BUN SERPL-MCNC: 12 MG/DL (ref 7–30)
CALCIUM SERPL-MCNC: 8.9 MG/DL (ref 8.5–10.1)
CHLORIDE SERPL-SCNC: 102 MMOL/L (ref 94–109)
CO2 SERPL-SCNC: 25 MMOL/L (ref 20–32)
CREAT SERPL-MCNC: 0.87 MG/DL (ref 0.66–1.25)
GFR SERPL CREATININE-BSD FRML MDRD: >90 ML/MIN/1.7M2
GLUCOSE SERPL-MCNC: 85 MG/DL (ref 70–99)
POTASSIUM SERPL-SCNC: 4.3 MMOL/L (ref 3.4–5.3)
PROT SERPL-MCNC: 7 G/DL (ref 6.8–8.8)
SODIUM SERPL-SCNC: 136 MMOL/L (ref 133–144)

## 2017-11-20 ENCOUNTER — RADIANT APPOINTMENT (OUTPATIENT)
Dept: GENERAL RADIOLOGY | Facility: CLINIC | Age: 42
End: 2017-11-20
Attending: FAMILY MEDICINE
Payer: COMMERCIAL

## 2017-11-20 ENCOUNTER — OFFICE VISIT (OUTPATIENT)
Dept: FAMILY MEDICINE | Facility: CLINIC | Age: 42
End: 2017-11-20
Payer: COMMERCIAL

## 2017-11-20 VITALS
OXYGEN SATURATION: 98 % | HEART RATE: 93 BPM | BODY MASS INDEX: 24.39 KG/M2 | WEIGHT: 190 LBS | SYSTOLIC BLOOD PRESSURE: 126 MMHG | TEMPERATURE: 97.7 F | RESPIRATION RATE: 16 BRPM | DIASTOLIC BLOOD PRESSURE: 76 MMHG

## 2017-11-20 DIAGNOSIS — Z11.3 SCREEN FOR STD (SEXUALLY TRANSMITTED DISEASE): ICD-10-CM

## 2017-11-20 DIAGNOSIS — R79.89 ELEVATED LFTS: ICD-10-CM

## 2017-11-20 DIAGNOSIS — M25.511 ACUTE PAIN OF RIGHT SHOULDER: ICD-10-CM

## 2017-11-20 DIAGNOSIS — R21 RASH: ICD-10-CM

## 2017-11-20 DIAGNOSIS — M25.511 ACUTE PAIN OF RIGHT SHOULDER: Primary | ICD-10-CM

## 2017-11-20 PROCEDURE — 99214 OFFICE O/P EST MOD 30 MIN: CPT | Performed by: FAMILY MEDICINE

## 2017-11-20 PROCEDURE — 87389 HIV-1 AG W/HIV-1&-2 AB AG IA: CPT | Performed by: FAMILY MEDICINE

## 2017-11-20 PROCEDURE — 87491 CHLMYD TRACH DNA AMP PROBE: CPT | Performed by: FAMILY MEDICINE

## 2017-11-20 PROCEDURE — 86780 TREPONEMA PALLIDUM: CPT | Performed by: FAMILY MEDICINE

## 2017-11-20 PROCEDURE — 87591 N.GONORRHOEAE DNA AMP PROB: CPT | Performed by: FAMILY MEDICINE

## 2017-11-20 PROCEDURE — 36415 COLL VENOUS BLD VENIPUNCTURE: CPT | Performed by: FAMILY MEDICINE

## 2017-11-20 PROCEDURE — 80076 HEPATIC FUNCTION PANEL: CPT | Performed by: FAMILY MEDICINE

## 2017-11-20 PROCEDURE — 87529 HSV DNA AMP PROBE: CPT | Performed by: FAMILY MEDICINE

## 2017-11-20 PROCEDURE — 73010 X-RAY EXAM OF SHOULDER BLADE: CPT | Mod: RT

## 2017-11-20 PROCEDURE — 87529 HSV DNA AMP PROBE: CPT | Mod: 91 | Performed by: FAMILY MEDICINE

## 2017-11-20 RX ORDER — SULFAMETHOXAZOLE/TRIMETHOPRIM 800-160 MG
1 TABLET ORAL 2 TIMES DAILY
Qty: 20 TABLET | Refills: 0 | Status: SHIPPED | OUTPATIENT
Start: 2017-11-20 | End: 2018-05-08
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
## 2017-11-20 NOTE — NURSING NOTE
"Chief Complaint   Patient presents with     Derm Problem     groin area     Musculoskeletal Problem     right shoulder and neck pain       Initial /76 (BP Location: Left arm, Patient Position: Sitting, Cuff Size: Adult Regular)  Pulse 93  Temp 97.7  F (36.5  C) (Tympanic)  Resp 16  Wt 190 lb (86.2 kg)  SpO2 98%  BMI 24.39 kg/m2 Estimated body mass index is 24.39 kg/(m^2) as calculated from the following:    Height as of 6/24/17: 6' 2\" (1.88 m).    Weight as of this encounter: 190 lb (86.2 kg).  Medication Reconciliation: complete     Rey Law MA      "

## 2017-11-21 LAB
ALBUMIN SERPL-MCNC: 4.2 G/DL (ref 3.4–5)
ALP SERPL-CCNC: 56 U/L (ref 40–150)
ALT SERPL W P-5'-P-CCNC: 35 U/L (ref 0–70)
AST SERPL W P-5'-P-CCNC: 26 U/L (ref 0–45)
BILIRUB DIRECT SERPL-MCNC: 0.3 MG/DL (ref 0–0.2)
BILIRUB SERPL-MCNC: 1.1 MG/DL (ref 0.2–1.3)
C TRACH DNA SPEC QL NAA+PROBE: NEGATIVE
HIV 1+2 AB+HIV1 P24 AG SERPL QL IA: NONREACTIVE
HSV1 DNA SPEC QL NAA+PROBE: NEGATIVE
HSV2 DNA SPEC QL NAA+PROBE: NEGATIVE
N GONORRHOEA DNA SPEC QL NAA+PROBE: NEGATIVE
PROT SERPL-MCNC: 7.4 G/DL (ref 6.8–8.8)
SPECIMEN SOURCE: NORMAL

## 2017-11-22 ENCOUNTER — MYC MEDICAL ADVICE (OUTPATIENT)
Dept: FAMILY MEDICINE | Facility: CLINIC | Age: 42
End: 2017-11-22

## 2017-11-22 LAB — T PALLIDUM IGG+IGM SER QL: NEGATIVE

## 2017-11-22 NOTE — TELEPHONE ENCOUNTER
Dr. Wegener-Please review and may close encounter.    Thank you!  RONALDO Thibodeaux, NEELAMN, RN

## 2017-11-29 ENCOUNTER — TELEPHONE (OUTPATIENT)
Dept: FAMILY MEDICINE | Facility: CLINIC | Age: 42
End: 2017-11-29

## 2017-11-29 DIAGNOSIS — Z87.19 HISTORY OF ESOPHAGEAL ULCER: ICD-10-CM

## 2017-11-29 DIAGNOSIS — K21.9 GASTROESOPHAGEAL REFLUX DISEASE, ESOPHAGITIS PRESENCE NOT SPECIFIED: Primary | ICD-10-CM

## 2017-11-29 NOTE — TELEPHONE ENCOUNTER
Signed referral for consult and they will order endoscopy also.   May take time to get in to see them.   If he has symptoms that are getting worse and cant wait to be seen by GI - schedule office visit or if getting worse - go to ER

## 2017-11-29 NOTE — TELEPHONE ENCOUNTER
Reason for Call: Request for an order or referral:    Order or referral being requested: Pt is calling req a referral for endoscopy. Pt has a HX of esophagitis and lesions in the esophagus.    Date needed: ASAP    Has the patient been seen by the PCP for this problem? YES    Additional comments: please call pt when ref is in    Phone number Patient can be reached at:  Home number on file [phone] (home)    Best Time:  anytime    Can we leave a detailed message on this number?  YES    Call taken on 11/29/2017 at 8:08 AM by Susan Jackson

## 2017-11-29 NOTE — TELEPHONE ENCOUNTER
--Please see patient's request in previous documentation this encounter.  --I loaded endoscopy for you to authorize if it is correct.    Nathalie Griffin RN

## 2017-12-02 ENCOUNTER — OFFICE VISIT (OUTPATIENT)
Dept: URGENT CARE | Facility: URGENT CARE | Age: 42
End: 2017-12-02
Payer: COMMERCIAL

## 2017-12-02 VITALS
DIASTOLIC BLOOD PRESSURE: 79 MMHG | TEMPERATURE: 98.4 F | OXYGEN SATURATION: 97 % | SYSTOLIC BLOOD PRESSURE: 131 MMHG | HEART RATE: 81 BPM | HEIGHT: 74 IN | WEIGHT: 191 LBS | BODY MASS INDEX: 24.51 KG/M2

## 2017-12-02 DIAGNOSIS — L73.9 FOLLICULITIS: Primary | ICD-10-CM

## 2017-12-02 PROCEDURE — 99213 OFFICE O/P EST LOW 20 MIN: CPT | Performed by: FAMILY MEDICINE

## 2017-12-02 RX ORDER — MUPIROCIN 20 MG/G
OINTMENT TOPICAL 2 TIMES DAILY
Qty: 22 G | Refills: 3 | Status: SHIPPED | OUTPATIENT
Start: 2017-12-02 | End: 2017-12-09

## 2017-12-02 RX ORDER — SULFAMETHOXAZOLE/TRIMETHOPRIM 800-160 MG
1 TABLET ORAL 2 TIMES DAILY
Qty: 20 TABLET | Refills: 0 | Status: SHIPPED | OUTPATIENT
Start: 2017-12-02 | End: 2018-05-08

## 2017-12-02 NOTE — NURSING NOTE
"Chief Complaint   Patient presents with     Urgent Care     Pt in clinic to have eval for recurrent genital rash.     Rash       Initial /79  Pulse 81  Temp 98.4  F (36.9  C) (Tympanic)  Ht 6' 2\" (1.88 m)  Wt 191 lb (86.6 kg)  SpO2 97%  BMI 24.52 kg/m2 Estimated body mass index is 24.52 kg/(m^2) as calculated from the following:    Height as of this encounter: 6' 2\" (1.88 m).    Weight as of this encounter: 191 lb (86.6 kg).  Medication Reconciliation: complete   Brea Bro/ MA    "

## 2017-12-02 NOTE — PROGRESS NOTES
SUBJECTIVE:  Chief Complaint   Patient presents with     Urgent Care     Pt in clinic to have eval for recurrent genital rash.     Rash       Jaun Lobo is a 42 year old male who presents to the clinic today for a rash.  Onset of rash was 1 day(s) ago.   Rash is sudden onset, still present and constant.  Location of the rash:  Lower abdomen and pubic area.  Quality/symptoms of rash: burning and redness   Symptoms are moderate and rash seems to be worsening.  Previous history of a similar rash? Yes: about 2 weeks ago he had sex and the next morning he had similar rash in the pubic area/ low abdomen-  He was diagnosed with folliculitis , treated with bactrim with resolution  Recent exposure history:   He had sex with the same person and developed rash in the same location of pubic area and lower abdomen    Associated symptoms include: nothing.  Patient denies: fever, nausea and vomiting.    Past Medical History:   Diagnosis Date     Alcohol dependence (H)      Allergic rhinitis, cause unspecified     allergy shots as kid     Anxiety state, unspecified     on [facility]     Back disorder     degenerative      DDD (degenerative disc disease)      Esophagitis      Lichen planus   2007     MEDICAL HISTORY OF - 3/10/10    ulcerative esophagitis     Mild intermittent asthma     rare use of albuteral     Tobacco use disorder     quit 2002     Unspecified hemorrhoids without mention of complication        ALLERGIES:  Antabuse [disulfiram]; Campral [acamprosate]; Animal dander; Dust mite extract; No known drug allergies; Ragweeds; and Seasonal allergies      Current Outpatient Prescriptions on File Prior to Visit:  sulfamethoxazole-trimethoprim (BACTRIM DS/SEPTRA DS) 800-160 MG per tablet Take 1 tablet by mouth 2 times daily (Patient not taking: Reported on 12/2/2017)   Acamprosate Calcium (CAMPRAL PO) Take by mouth 3 times daily   meclizine (ANTIVERT) 25 MG tablet Take 1 tablet (25 mg) by mouth 4 times daily as needed for  dizziness (Patient not taking: Reported on 12/2/2017)   temazepam (RESTORIL) 7.5 MG capsule Take 1 capsule (7.5 mg) by mouth nightly as needed for sleep (Patient not taking: Reported on 12/2/2017)   albuterol (PROAIR HFA, PROVENTIL HFA, VENTOLIN HFA) 108 (90 BASE) MCG/ACT inhaler Inhale 2 puffs into the lungs every 4 hours as needed for shortness of breath / dyspnea or wheezing   melatonin 3 MG tablet Take 3 mg by mouth nightly as needed.     No current facility-administered medications on file prior to visit.     Social History   Substance Use Topics     Smoking status: Never Smoker     Smokeless tobacco: Never Used     Alcohol use No      Comment: 40 drinks a week , quit drinking 6 weeks ago        Family History   Problem Relation Age of Onset     CEREBROVASCULAR DISEASE Maternal Grandfather      had strokes in his 80's     GASTROINTESTINAL DISEASE Mother      ulcerative colitis     Anxiety Disorder Mother      CANCER Other      esophageal cancer, cousin     C.A.D. No family hx of      DIABETES No family hx of      Hypertension No family hx of      Breast Cancer No family hx of      Cancer - colorectal No family hx of      Prostate Cancer No family hx of      Unknown/Adopted No family hx of      Depression No family hx of      Schizophrenia No family hx of      Bipolar Disorder No family hx of      Suicide No family hx of      Substance Abuse No family hx of      Dementia No family hx of      [place] Disease No family hx of      Parkinsonism No family hx of      Autism Spectrum Disorder No family hx of      Intellectual Disability (Mental Retardation) No family hx of      MENTAL ILLNESS No family hx of          ROS:  CONSTITUTIONAL:NEGATIVE for fever, chills, change in weight  EYES: NEGATIVE for vision changes or irritation  ENT/MOUTH: NEGATIVE for ear, mouth and throat problems  RESP:NEGATIVE for significant cough or SOB  GI: NEGATIVE for nausea, abdominal pain, heartburn, or change in bowel habits    EXAM:  "  /79  Pulse 81  Temp 98.4  F (36.9  C) (Tympanic)  Ht 6' 2\" (1.88 m)  Wt 191 lb (86.6 kg)  SpO2 97%  BMI 24.52 kg/m2  GENERAL: alert, no acute distress.  SKIN: Rash description:    Distribution: localized  Location: pubic area and lower abdomen 20 x 10 cm    Color: red,  Lesion type: maculopapular, blotchy, scattered discrete lesions with warmth, tenderness, swelling and inflammation  GENERAL APPEARANCE: healthy, alert and no distress  ABDOMEN:  soft, nontender, no HSM or masses and bowel sounds normal  Extremities: no peripheral edema or tenderness, peripheral pulses normal  MS:  extremities normal- no gross deformities noted, no erythema, FROM noted in all extremities    ASSESSMENT:  Folliculitis     - sulfamethoxazole-trimethoprim (BACTRIM DS/SEPTRA DS) 800-160 MG per tablet; Take 1 tablet by mouth 2 times daily  - mupirocin (BACTROBAN) 2 % ointment; Apply topically 2 times daily for 7 days     May try to resolve with topical and if not improving start oral antibiotic   Follow-up with primary clinic if not improving  "

## 2017-12-02 NOTE — MR AVS SNAPSHOT
"              After Visit Summary   12/2/2017    Jaun Lobo    MRN: [id number]           Patient Information     Date Of Birth          1975        Visit Information        Provider Department      12/2/2017 11:50 AM Clare Shea MD [facility]        Today's Diagnoses     Folliculitis    -  1       Follow-ups after your visit        Who to contact     If you have questions or need follow up information about today's clinic visit or your schedule please contact [facility] directly at [phone].  Normal or non-critical lab and imaging results will be communicated to you by [employer], letter or phone within 4 business days after the clinic has received the results. If you do not hear from us within 7 days, please contact the clinic through [employer] or phone. If you have a critical or abnormal lab result, we will notify you by phone as soon as possible.  Submit refill requests through [employer] or call your pharmacy and they will forward the refill request to us. Please allow 3 business days for your refill to be completed.          Additional Information About Your Visit        MyChart Information     [employer] gives you secure access to your electronic health record. If you see a primary care provider, you can also send messages to your care team and make appointments. If you have questions, please call your primary care clinic.  If you do not have a primary care provider, please call [phone] and they will assist you.        Care EveryWhere ID     This is your Care EveryWhere ID. This could be used by other organizations to access your [facility] medical records  [id number]        Your Vitals Were     Pulse Temperature Height Pulse Oximetry BMI (Body Mass Index)       81 98.4  F (36.9  C) (Tympanic) 6' 2\" (1.88 m) 97% 24.52 kg/m2        Blood Pressure from Last 3 Encounters:   12/02/17 131/79   11/20/17 126/76   09/13/17 124/81    Weight from Last 3 " Encounters:   12/02/17 191 lb (86.6 kg)   11/20/17 190 lb (86.2 kg)   09/13/17 189 lb 12 oz (86.1 kg)              Today, you had the following     No orders found for display         Today's Medication Changes          These changes are accurate as of: 12/2/17 12:19 PM.  If you have any questions, ask your nurse or doctor.               Start taking these medicines.        Dose/Directions    mupirocin 2 % ointment   Commonly known as:  BACTROBAN   Used for:  Folliculitis   Started by:  Clare Shea MD        Apply topically 2 times daily for 7 days   Quantity:  22 g   Refills:  3         These medicines have changed or have updated prescriptions.        Dose/Directions    * sulfamethoxazole-trimethoprim 800-160 MG per tablet   Commonly known as:  BACTRIM DS/SEPTRA DS   This may have changed:  Another medication with the same name was added. Make sure you understand how and when to take each.   Used for:  Rash   Changed by:  Wegener, Joel Daniel Irwin, MD        Dose:  1 tablet   Take 1 tablet by mouth 2 times daily   Quantity:  20 tablet   Refills:  0       * sulfamethoxazole-trimethoprim 800-160 MG per tablet   Commonly known as:  BACTRIM DS/SEPTRA DS   This may have changed:  You were already taking a medication with the same name, and this prescription was added. Make sure you understand how and when to take each.   Used for:  Folliculitis   Changed by:  Clare Shea MD        Dose:  1 tablet   Take 1 tablet by mouth 2 times daily   Quantity:  20 tablet   Refills:  0       * Notice:  This list has 2 medication(s) that are the same as other medications prescribed for you. Read the directions carefully, and ask your doctor or other care provider to review them with you.         Where to get your medicines      These medications were sent to [id number] - [place], [place] - [address]  [address], [place] [address]     Phone:  [phone]     mupirocin 2 % ointment         Some of  these will need a paper prescription and others can be bought over the counter.  Ask your nurse if you have questions.     Bring a paper prescription for each of these medications     sulfamethoxazole-trimethoprim 800-160 MG per tablet                Primary Care Provider Office Phone # Fax #    Parker Komal Bush [phone] [address] [place], [place] [id number]        Equal Access to Services     DAISY ZARAGOZA : Hadii aad ku hadasho Soomaali, waaxda luqadaha, qaybta kaalmada adeegyada, waxay idiin hayaan ademarielle angalton lajo . So [facility] [phone].    ATENCIÓN: Si habla espclinton, tiene a bowman disposición servicios gratuitos de asistencia lingüística. [facility] [phone].    We comply with applicable federal civil rights laws and [place] laws. We do not discriminate on the basis of race, color, national origin, age, disability, sex, sexual orientation, or gender identity.            Thank you!     Thank you for choosing [facility]  for your care. Our goal is always to provide you with excellent care. Hearing back from our patients is one way we can continue to improve our services. Please take a few minutes to complete the written survey that you may receive in the mail after your visit with us. Thank you!             Your Updated Medication List - Protect others around you: Learn how to safely use, store and throw away your medicines at www.disposemymeds.org.          This list is accurate as of: 12/2/17 12:19 PM.  Always use your most recent med list.                   Brand Name Dispense Instructions for use Diagnosis    albuterol 108 (90 BASE) MCG/ACT Inhaler    PROAIR HFA/PROVENTIL HFA/VENTOLIN HFA     Inhale 2 puffs into the lungs every 4 hours as needed for shortness of breath / dyspnea or wheezing        CAMPRAL PO      Take by mouth 3 times daily        meclizine 25 MG tablet    ANTIVERT    30 tablet    Take 1 tablet (25 mg) by mouth 4 times daily as  needed for dizziness        melatonin 3 MG tablet      Take 3 mg by mouth nightly as needed.    Insomnia       mupirocin 2 % ointment    BACTROBAN    22 g    Apply topically 2 times daily for 7 days    Folliculitis       * sulfamethoxazole-trimethoprim 800-160 MG per tablet    BACTRIM DS/SEPTRA DS    20 tablet    Take 1 tablet by mouth 2 times daily    Rash       * sulfamethoxazole-trimethoprim 800-160 MG per tablet    BACTRIM DS/SEPTRA DS    20 tablet    Take 1 tablet by mouth 2 times daily    Folliculitis       temazepam 7.5 MG capsule    RESTORIL    30 capsule    Take 1 capsule (7.5 mg) by mouth nightly as needed for sleep    Insomnia, unspecified       * Notice:  This list has 2 medication(s) that are the same as other medications prescribed for you. Read the directions carefully, and ask your doctor or other care provider to review them with you.

## 2017-12-07 ENCOUNTER — TELEPHONE (OUTPATIENT)
Dept: FAMILY MEDICINE | Facility: CLINIC | Age: 42
End: 2017-12-07

## 2017-12-07 DIAGNOSIS — M25.511 ACUTE PAIN OF RIGHT SHOULDER: Primary | ICD-10-CM

## 2017-12-07 DIAGNOSIS — Z82.69 FAMILY HISTORY OF ANKYLOSING SPONDYLITIS: ICD-10-CM

## 2017-12-07 DIAGNOSIS — M54.9 PAIN IN SPINAL COLUMN: ICD-10-CM

## 2017-12-07 NOTE — TELEPHONE ENCOUNTER
"Reason for Call: Request for an order or referral:    Order or referral being requested: Whatever PCP deems appropriate    Date needed: as soon as possible    Has the patient been seen by the PCP for this problem? YES See 11/20/17 office visit notes.    Additional comments: Patient is still symptomatic with worsening symptoms.  Patient reports an \"electric\" feel periodically on his right side.    Phone number Patient can be reached at:  Home number on file [phone] (home)    Best Time:      Can we leave a detailed message on this number?  YES    Call taken on 12/7/2017 at 9:42 AM by Vonnie Ibrahim    "

## 2017-12-07 NOTE — TELEPHONE ENCOUNTER
Dr. Wegener-Please review and advise.  Writer pended PT referral.    Thank you!  NEELAM DonovanN, RN

## 2017-12-08 NOTE — TELEPHONE ENCOUNTER
The patient does not believe that PT will be the solution to his problem.  The patient would like an MRI ordered.  Please follow up with the patient.

## 2017-12-08 NOTE — TELEPHONE ENCOUNTER
I appreciate well thought out info.     I think ortho referral best place to start, they can also eval for ankylosing spondylitis and decide on imaging.     Referral signed.     Joel Wegener,MD

## 2017-12-08 NOTE — TELEPHONE ENCOUNTER
Patient informed referral to Ortho  given.  Referral sent to patient via My Chart.  Yaima Richey RN

## 2017-12-08 NOTE — TELEPHONE ENCOUNTER
Patient called back after leaving message on voice mail and he is saying that he does not need physical therapy he would like to get a MRI done in order to find out what is going on with him. He does not want physical therapy he is asking for a MRI to be done. Is asking that one of the nurse's to call back and he can explain more in detail as to why he wants this MRI

## 2017-12-08 NOTE — TELEPHONE ENCOUNTER
"Writer called patient who stated:  1. \"Deep amorphous ache\" starts in middle of night: [id number]   A. So intense it wakes him up   B. Location: between spinal column and shoulder   C. Timing suggests inflammatory cause  2. First half of day, feels electric sensation throughout anterior and posterior chest  3. Around noon has daily workout and pain starts to go away  4. This cycle \"repeats like clockwork\" every night and day   A. Has not gotten a good night's sleep in over a month  5. Has degenerative disc disease and stenosis   A. This is possibly happening in his upper spine but current pain feels different   6. Thinks referral to physical therapy is \"premature\" since cause of this pain is unknown  7. Does not feel this is muscular/joint related, this has slowly developed over time  8. Worried about a tumor  9. Based on his health history, would like to suggest Dr. Wegener consider rheumatology or orthopedic referral  10. Familial history of Ankylosing Spondylitis   11. Symptoms steadily getting worse  12. Very healthy lifestyle: swims and does yoga    Writer discussed with patient to try not to worry right now as there is not evidence suggestive of a tumor.    MRI, Rheumatology and Orthopedic  referral pended.    Dr. Wegener-Please review and advise.    Thank you!  NEELAM DonovanN, RN    "

## 2017-12-11 ENCOUNTER — OFFICE VISIT (OUTPATIENT)
Dept: NEUROSURGERY | Facility: CLINIC | Age: 42
End: 2017-12-11
Attending: NURSE PRACTITIONER
Payer: COMMERCIAL

## 2017-12-11 VITALS
OXYGEN SATURATION: 96 % | HEART RATE: 69 BPM | SYSTOLIC BLOOD PRESSURE: 127 MMHG | WEIGHT: 190 LBS | HEIGHT: 73 IN | TEMPERATURE: 97.2 F | DIASTOLIC BLOOD PRESSURE: 79 MMHG | BODY MASS INDEX: 25.18 KG/M2

## 2017-12-11 DIAGNOSIS — M54.2 CERVICALGIA: Primary | ICD-10-CM

## 2017-12-11 PROCEDURE — 99243 OFF/OP CNSLTJ NEW/EST LOW 30: CPT | Performed by: NURSE PRACTITIONER

## 2017-12-11 PROCEDURE — 99211 OFF/OP EST MAY X REQ PHY/QHP: CPT | Performed by: NURSE PRACTITIONER

## 2017-12-11 NOTE — PROGRESS NOTES
"Dr. Rickey Newton  [facility]  Neurosurgery Clinic Visit        CC: Right scapular pain    Primary care Provider: Parker Bush      Reason For Visit:   I was asked by Dr. Wegener to consult on the patient for acute pain of the right shoulder .      HPI: Jaun Lobo is a 42 year old male with pain that he describes to the right scapular region.  He states he has an aching pain to the right upper back/scapular area that awakens him in the early morning around 3am.  He generally takes Advil and uses heat and is able to get back to sleep.  When he awakens he states he has a \"stiffness\" the neck as well.  He states the morning is usually the worst time for him as far as the pain and then as the day progresses the pain gradually improves.  He also describes an \"electrical\" pain to the right chest wall in the morning as well.  He denies SOB.  He denies weakness to BUE or pain radiating into the arms.  He denies issues with gait or balance.  He has not had recent PT but states he is extremely active and does yoga and exercises.  Yoga tends to relieve his pain.    Pain right now:  2    Past Medical History:   Diagnosis Date     Alcohol dependence (H)      Allergic rhinitis, cause unspecified     allergy shots as kid     Anxiety state, unspecified     on Maryellen's     Back disorder     degenerative      DDD (degenerative disc disease)      Esophagitis      Lichen planus   2007     MEDICAL HISTORY OF - 3/10/10    ulcerative esophagitis     Mild intermittent asthma     rare use of albuteral     Tobacco use disorder     quit 2002     Unspecified hemorrhoids without mention of complication        Past Medical History reviewed with patient during visit.    Past Surgical History:   Procedure Laterality Date     C NONSPECIFIC PROCEDURE      nasal fx; left clavic fx     C NONSPECIFIC PROCEDURE  12/04    normal colonoscopy; rpt age 49     CL AFF SURGICAL PATHOLOGY       ESOPHAGOSCOPY, GASTROSCOPY, " DUODENOSCOPY (EGD), COMBINED  5/9/2014    Procedure: COMBINED ESOPHAGOSCOPY, GASTROSCOPY, DUODENOSCOPY (EGD), BIOPSY SINGLE OR MULTIPLE;  Surgeon: Yanely Macias MD;  Location:  GI     [facility]       Past Surgical History reviewed with patient during visit.    Current Outpatient Prescriptions   Medication     temazepam (RESTORIL) 7.5 MG capsule     albuterol (PROAIR HFA, PROVENTIL HFA, VENTOLIN HFA) 108 (90 BASE) MCG/ACT inhaler     melatonin 3 MG tablet     sulfamethoxazole-trimethoprim (BACTRIM DS/SEPTRA DS) 800-160 MG per tablet     sulfamethoxazole-trimethoprim (BACTRIM DS/SEPTRA DS) 800-160 MG per tablet     Acamprosate Calcium (CAMPRAL PO)     meclizine (ANTIVERT) 25 MG tablet     No current facility-administered medications for this visit.        Allergies   Allergen Reactions     Antabuse [Disulfiram] Other (See Comments)     hepatitis     Campral [Acamprosate] Other (See Comments)     Mood changes     Animal Dander      Dust Mite Extract      No Known Drug Allergies      Ragweeds      goldenrod     Seasonal Allergies        Social History     Social History     Marital status: Single     Spouse name: N/A     Number of children: 0     Years of education: N/A     Occupational History       [employer]     Social History Main Topics     Smoking status: Never Smoker     Smokeless tobacco: Never Used     Alcohol use No      Comment: 40 drinks a week , quit drinking 6 weeks ago      Drug use: No     Sexual activity: Yes     Partners: Female     Other Topics Concern     Parent/Sibling W/ Cabg, Mi Or Angioplasty Before 65f 55m? No     Social History Narrative    Balanced Diet - Yes    Osteoporosis Preventative measures-  Dairy servings per day: 2 and Weight bearing exercise    Regular Exercise -  Yes Describe Runs every AM for 15 minutes.    Dental Exam up - YES - Date: 05/2003    Seatbelts used - Yes    Self Testicular Exam -  Yes    Abuse: Current or Past (Physical, Sexual  "or Emotional)- No    Do you have any concerns about STD's -  No    Do you feel safe in your environment - Yes    Guns stored in the home - No    Sunscreen used - No    Lipids - YES - Date: Has been several years.    Glucose -  YES - Date: Has been several years.    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                           Family History   Problem Relation Age of Onset     CEREBROVASCULAR DISEASE Maternal Grandfather      had strokes in his 80's     GASTROINTESTINAL DISEASE Mother      ulcerative colitis     Anxiety Disorder Mother      CANCER Other      esophageal cancer, cousin     C.A.D. No family hx of      DIABETES No family hx of      Hypertension No family hx of      Breast Cancer No family hx of      Cancer - colorectal No family hx of      Prostate Cancer No family hx of      Unknown/Adopted No family hx of      Depression No family hx of      Schizophrenia No family hx of      Bipolar Disorder No family hx of      Suicide No family hx of      Substance Abuse No family hx of      Dementia No family hx of      Alva Disease No family hx of      Parkinsonism No family hx of      Autism Spectrum Disorder No family hx of      Intellectual Disability (Mental Retardation) No family hx of      MENTAL ILLNESS No family hx of          ROS: 10 point ROS neg other than the symptoms noted above in the HPI.    Vital Signs: /79 (BP Location: Left arm, Cuff Size: Adult Regular)  Pulse 69  Temp 97.2  F (36.2  C) (Oral)  Ht 6' 1.25\" (1.861 m)  Wt 190 lb (86.2 kg)  SpO2 96%  BMI 24.9 kg/m2    Examination:  Constitutional:  Alert, well nourished, NAD.  HEENT: Normocephalic, atraumatic.   Pulm:  Without shortness of breath   CV:  No pitting edema of BLE.    Neurological:  Awake  Alert  Oriented x " "3  Speech clear  Cranial nerves II - XII intact    Motor exam   Shoulder Abduction:  Right:  5/5   Left:  5/5  Biceps:                      Right:  5/5   Left:  5/5  Triceps:                     Right:  5/5   Left:  5/5  Wrist Extensors:       Right:  5/5   Left:  5/5  Wrist Flexors:           Right:  5/5   Left:  5/5  Intrinsics:                   Right:  5/5   Left:  5/5  Hip Flexor:                Right: 5/5  Left:  5/5  Hip Adductor:             Right:  5/5  Left:  5/5  Hip Abductor:             Right:  5/5  Left:  5/5  Gastroc Soleus:        Right:  5/5  Left:  5/5  Tib/Ant:                      Right:  5/5  Left:  5/5  EHL:                          Right:  5/5  Left:  5/5   Sensation normal to bilateral upper  extremities  Gait: Able to stand from a seated position. Normal non-antalgic, non-myelopathic gait.  Cervical examination reveals good range of motion.  No tenderness to palpation of the cervical spine or paraspinous muscles bilaterally.    Lumbar examination reveals no tenderness of the spine or paraspinous muscles.      Imaging: Normal Xray of right scapula    Assessment/Plan:   Cervicalgias    Jaun Lobo is a 42 year old male with pain that he describes to the right scapular region.  He states he has an aching pain to the right upper back/scapular area that awakens him in the early morning around 3am.  He generally takes Advil and uses heat and is able to get back to sleep.  When he awakens he states he has a \"stiffness\" the neck as well.  He states the morning is usually the worst time for him as far as the pain and then as the day progresses the pain gradually improves.  He also describes an \"electrical\" pain to the right chest wall in the morning as well.  He denies SOB.  He denies weakness to BUE or pain radiating into the arms.  He denies issues with gait or balance.  He has not had recent PT but states he is extremely active and does yoga and exercises.  Yoga tends to relieve his pain.  We " discussed indications for cervical Xrays vs MRI.  We also discussed possible PT.  The patient stated he would like to see how the next month goes and then if not improved or pain worsens he is interested in an MRI of the cervical spine.  He would like to hold off on any PT at this time.  He will contact us if any further concerns or questions. If MRI completed we will contact him with results.        Patient Instructions   Schedule cervical MRI  Please contact the clinic if pain persists at [phone].          Esther Moore [facility]  [facility]  [address] [address]    Tel [phone]  Pager [phone]

## 2017-12-11 NOTE — MR AVS SNAPSHOT
After Visit Summary   12/11/2017    Jaun Lobo    MRN: [id number]           Patient Information     Date Of Birth          1975        Visit Information        Provider Department      12/11/2017 11:40 AM Esther Moore NP [facility]        Today's Diagnoses     Cervicalgia    -  1      Care Instructions    Schedule cervical MRI  Please contact the clinic if pain persists at [phone].            Follow-ups after your visit        Future tests that were ordered for you today     Open Future Orders        Priority Expected Expires Ordered    MR Cervical Spine w/o Contrast Routine  12/11/2018 12/11/2017            Who to contact     If you have questions or need follow up information about today's clinic visit or your schedule please contact [facility] directly at [phone].  Normal or non-critical lab and imaging results will be communicated to you by Alerehart, letter or phone within 4 business days after the clinic has received the results. If you do not hear from us within 7 days, please contact the clinic through Alerehart or phone. If you have a critical or abnormal lab result, we will notify you by phone as soon as possible.  Submit refill requests through EZ4U or call your pharmacy and they will forward the refill request to us. Please allow 3 business days for your refill to be completed.          Additional Information About Your Visit        MyChart Information     EZ4U gives you secure access to your electronic health record. If you see a primary care provider, you can also send messages to your care team and make appointments. If you have questions, please call your primary care clinic.  If you do not have a primary care provider, please call [phone] and they will assist you.        Care EveryWhere ID     This is your Care EveryWhere ID. This could be used by other organizations to access your [facility]  "medical records  [id number]        Your Vitals Were     Pulse Temperature Height Pulse Oximetry BMI (Body Mass Index)       69 97.2  F (36.2  C) (Oral) 6' 1.25\" (1.861 m) 96% 24.9 kg/m2        Blood Pressure from Last 3 Encounters:   12/11/17 127/79   12/02/17 131/79   11/20/17 126/76    Weight from Last 3 Encounters:   12/11/17 190 lb (86.2 kg)   12/02/17 191 lb (86.6 kg)   11/20/17 190 lb (86.2 kg)               Primary Care Provider Office Phone # Fax #    Parker Komal Bush [phone] [address] [place], [place] [id number]        Equal Access to Services     DAISY ZARAGOZA : Mohsen Tuttle, waitz luqadaha, qaybginger kaalmanakul schrader, stacy chairez . So [facility] [phone].    ATENCIÓN: Si habla español, tiene a bowman disposición servicios gratuitos de asistencia lingüística. Sommer al [phone].    We comply with applicable federal civil rights laws and [place] laws. We do not discriminate on the basis of race, color, national origin, age, disability, sex, sexual orientation, or gender identity.            Thank you!     Thank you for choosing [facility]  for your care. Our goal is always to provide you with excellent care. Hearing back from our patients is one way we can continue to improve our services. Please take a few minutes to complete the written survey that you may receive in the mail after your visit with us. Thank you!             Your Updated Medication List - Protect others around you: Learn how to safely use, store and throw away your medicines at www.disposemymeds.org.          This list is accurate as of: 12/11/17 12:01 PM.  Always use your most recent med list.                   Brand Name Dispense Instructions for use Diagnosis    albuterol 108 (90 BASE) MCG/ACT Inhaler    PROAIR HFA/PROVENTIL HFA/VENTOLIN HFA     Inhale 2 puffs into the lungs every 4 hours as needed for shortness of breath / " dyspnea or wheezing        CAMPRAL PO      Take by mouth 3 times daily        meclizine 25 MG tablet    ANTIVERT    30 tablet    Take 1 tablet (25 mg) by mouth 4 times daily as needed for dizziness        melatonin 3 MG tablet      Take 3 mg by mouth nightly as needed.    Insomnia       * sulfamethoxazole-trimethoprim 800-160 MG per tablet    BACTRIM DS/SEPTRA DS    20 tablet    Take 1 tablet by mouth 2 times daily    Rash       * sulfamethoxazole-trimethoprim 800-160 MG per tablet    BACTRIM DS/SEPTRA DS    20 tablet    Take 1 tablet by mouth 2 times daily    Folliculitis       temazepam 7.5 MG capsule    RESTORIL    30 capsule    Take 1 capsule (7.5 mg) by mouth nightly as needed for sleep    Insomnia, unspecified       * Notice:  This list has 2 medication(s) that are the same as other medications prescribed for you. Read the directions carefully, and ask your doctor or other care provider to review them with you.

## 2017-12-11 NOTE — LETTER
"    12/11/2017         RE: Jaun Lobo  [address]  [facility] [address]        Dear Colleague,    Thank you for referring your patient, Jaun Lobo, to the [facility]. Please see a copy of my visit note below.    Dr. Rickey Newton  [facility]  Neurosurgery Clinic Visit        CC: Right scapular pain    Primary care Provider: Parker Bush      Reason For Visit:   I was asked by Dr. Wegener to consult on the patient for acute pain of the right shoulder .      HPI: Jaun Lobo is a 42 year old male with pain that he describes to the right scapular region.  He states he has an aching pain to the right upper back/scapular area that awakens him in the early morning around 3am.  He generally takes Advil and uses heat and is able to get back to sleep.  When he awakens he states he has a \"stiffness\" the neck as well.  He states the morning is usually the worst time for him as far as the pain and then as the day progresses the pain gradually improves.  He also describes an \"electrical\" pain to the right chest wall in the morning as well.  He denies SOB.  He denies weakness to BUE or pain radiating into the arms.  He denies issues with gait or balance.  He has not had recent PT but states he is extremely active and does yoga and exercises.  Yoga tends to relieve his pain.    Pain right now:  2    Past Medical History:   Diagnosis Date     Alcohol dependence (H)      Allergic rhinitis, cause unspecified     allergy shots as kid     Anxiety state, unspecified     on Maryellen's     Back disorder     degenerative      DDD (degenerative disc disease)      Esophagitis      Lichen planus   2007     MEDICAL HISTORY OF - 3/10/10    ulcerative esophagitis     Mild intermittent asthma     rare use of albuteral     Tobacco use disorder     quit 2002     Unspecified hemorrhoids without mention of complication        Past Medical History reviewed with patient " during visit.    Past Surgical History:   Procedure Laterality Date     C NONSPECIFIC PROCEDURE      nasal fx; left clavic fx     C NONSPECIFIC PROCEDURE  12/04    normal colonoscopy; rpt age 49     CL AFF SURGICAL PATHOLOGY       ESOPHAGOSCOPY, GASTROSCOPY, DUODENOSCOPY (EGD), COMBINED  5/9/2014    Procedure: COMBINED ESOPHAGOSCOPY, GASTROSCOPY, DUODENOSCOPY (EGD), BIOPSY SINGLE OR MULTIPLE;  Surgeon: Yanely Macias MD;  Location:  GI     [facility]       Past Surgical History reviewed with patient during visit.    Current Outpatient Prescriptions   Medication     temazepam (RESTORIL) 7.5 MG capsule     albuterol (PROAIR HFA, PROVENTIL HFA, VENTOLIN HFA) 108 (90 BASE) MCG/ACT inhaler     melatonin 3 MG tablet     sulfamethoxazole-trimethoprim (BACTRIM DS/SEPTRA DS) 800-160 MG per tablet     sulfamethoxazole-trimethoprim (BACTRIM DS/SEPTRA DS) 800-160 MG per tablet     Acamprosate Calcium (CAMPRAL PO)     meclizine (ANTIVERT) 25 MG tablet     No current facility-administered medications for this visit.        Allergies   Allergen Reactions     Antabuse [Disulfiram] Other (See Comments)     hepatitis     Campral [Acamprosate] Other (See Comments)     Mood changes     Animal Dander      Dust Mite Extract      No Known Drug Allergies      Ragweeds      goldenrod     Seasonal Allergies        Social History     Social History     Marital status: Single     Spouse name: N/A     Number of children: 0     Years of education: N/A     Occupational History       [employer]     Social History Main Topics     Smoking status: Never Smoker     Smokeless tobacco: Never Used     Alcohol use No      Comment: 40 drinks a week , quit drinking 6 weeks ago      Drug use: No     Sexual activity: Yes     Partners: Female     Other Topics Concern     Parent/Sibling W/ Cabg, Mi Or Angioplasty Before 65f 55m? No     Social History Narrative    Balanced Diet - Yes    Osteoporosis Preventative measures-   "Dairy servings per day: 2 and Weight bearing exercise    Regular Exercise -  Yes Describe Runs every AM for 15 minutes.    Dental Exam up - YES - Date: 05/2003    Seatbelts used - Yes    Self Testicular Exam -  Yes    Abuse: Current or Past (Physical, Sexual or Emotional)- No    Do you have any concerns about STD's -  No    Do you feel safe in your environment - Yes    Guns stored in the home - No    Sunscreen used - No    Lipids - YES - Date: Has been several years.    Glucose -  YES - Date: Has been several years.    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                           Family History   Problem Relation Age of Onset     CEREBROVASCULAR DISEASE Maternal Grandfather      had strokes in his 80's     GASTROINTESTINAL DISEASE Mother      ulcerative colitis     Anxiety Disorder Mother      CANCER Other      esophageal cancer, cousin     C.A.D. No family hx of      DIABETES No family hx of      Hypertension No family hx of      Breast Cancer No family hx of      Cancer - colorectal No family hx of      Prostate Cancer No family hx of      Unknown/Adopted No family hx of      Depression No family hx of      Schizophrenia No family hx of      Bipolar Disorder No family hx of      Suicide No family hx of      Substance Abuse No family hx of      Dementia No family hx of      Alva Disease No family hx of      Parkinsonism No family hx of      Autism Spectrum Disorder No family hx of      Intellectual Disability (Mental Retardation) No family hx of      MENTAL ILLNESS No family hx of          ROS: 10 point ROS neg other than the symptoms noted above in the HPI.    Vital Signs: /79 (BP Location: Left arm, Cuff Size: Adult Regular)  Pulse 69  Temp 97.2  F (36.2  C) (Oral)  Ht 6' 1.25\" (1.861 m)  Wt " "190 lb (86.2 kg)  SpO2 96%  BMI 24.9 kg/m2    Examination:  Constitutional:  Alert, well nourished, NAD.  HEENT: Normocephalic, atraumatic.   Pulm:  Without shortness of breath   CV:  No pitting edema of BLE.    Neurological:  Awake  Alert  Oriented x 3  Speech clear  Cranial nerves II - XII intact    Motor exam   Shoulder Abduction:  Right:  5/5   Left:  5/5  Biceps:                      Right:  5/5   Left:  5/5  Triceps:                     Right:  5/5   Left:  5/5  Wrist Extensors:       Right:  5/5   Left:  5/5  Wrist Flexors:           Right:  5/5   Left:  5/5  Intrinsics:                   Right:  5/5   Left:  5/5  Hip Flexor:                Right: 5/5  Left:  5/5  Hip Adductor:             Right:  5/5  Left:  5/5  Hip Abductor:             Right:  5/5  Left:  5/5  Gastroc Soleus:        Right:  5/5  Left:  5/5  Tib/Ant:                      Right:  5/5  Left:  5/5  EHL:                          Right:  5/5  Left:  5/5   Sensation normal to bilateral upper  extremities  Gait: Able to stand from a seated position. Normal non-antalgic, non-myelopathic gait.  Cervical examination reveals good range of motion.  No tenderness to palpation of the cervical spine or paraspinous muscles bilaterally.    Lumbar examination reveals no tenderness of the spine or paraspinous muscles.      Imaging: Normal Xray of right scapula    Assessment/Plan:   Cervicalgias    Jaun Lobo is a 42 year old male with pain that he describes to the right scapular region.  He states he has an aching pain to the right upper back/scapular area that awakens him in the early morning around 3am.  He generally takes Advil and uses heat and is able to get back to sleep.  When he awakens he states he has a \"stiffness\" the neck as well.  He states the morning is usually the worst time for him as far as the pain and then as the day progresses the pain gradually improves.  He also describes an \"electrical\" pain to the right chest wall in the " morning as well.  He denies SOB.  He denies weakness to BUE or pain radiating into the arms.  He denies issues with gait or balance.  He has not had recent PT but states he is extremely active and does yoga and exercises.  Yoga tends to relieve his pain.  We discussed indications for cervical Xrays vs MRI.  We also discussed possible PT.  The patient stated he would like to see how the next month goes and then if not improved or pain worsens he is interested in an MRI of the cervical spine.  He would like to hold off on any PT at this time.  He will contact us if any further concerns or questions. If MRI completed we will contact him with results.        Patient Instructions   Schedule cervical MRI  Please contact the clinic if pain persists at [phone].          Eshter Moore CNP  [facility]  [address] [address]    Tel [phone]  Pager [phone]      Again, thank you for allowing me to participate in the care of your patient.        Sincerely,        Esther Moore, NUBIA

## 2017-12-11 NOTE — NURSING NOTE
"Jaun Lobo is a 42 year old male who presents for:  Chief Complaint   Patient presents with     Neurologic Problem     referral from Dr. Wegener for right shoulder pain, family history of ankylosing spoidylithis        Initial Vitals:  There were no vitals taken for this visit. Estimated body mass index is 24.52 kg/(m^2) as calculated from the following:    Height as of 12/2/17: 6' 2\" (1.88 m).    Weight as of 12/2/17: 191 lb (86.6 kg).. There is no height or weight on file to calculate BSA. BP completed using cuff size: regular  Data Unavailable    Do you feel safe in your environment?  Yes  Do you need any refills today? No    Nursing Comments: referral from Dr. Wegener for right shoulder pain, family history of ankylosing spoidylithis.  Patient rates his pain today as        5 min. nursing intake time  Sultana Saez CMA, AAS      Discharge plan:   See providers dictation   2 min. nursing discharge time  Sultana Saez CMA, AAS       "

## 2017-12-19 ENCOUNTER — TRANSFERRED RECORDS (OUTPATIENT)
Dept: HEALTH INFORMATION MANAGEMENT | Facility: CLINIC | Age: 42
End: 2017-12-19

## 2018-04-20 DIAGNOSIS — M54.2 CERVICALGIA: ICD-10-CM

## 2018-04-20 DIAGNOSIS — Z11.3 SCREEN FOR STD (SEXUALLY TRANSMITTED DISEASE): ICD-10-CM

## 2018-04-20 DIAGNOSIS — R79.89 ELEVATED LFTS: ICD-10-CM

## 2018-04-20 PROCEDURE — 36415 COLL VENOUS BLD VENIPUNCTURE: CPT | Performed by: FAMILY MEDICINE

## 2018-04-20 PROCEDURE — 80076 HEPATIC FUNCTION PANEL: CPT | Performed by: FAMILY MEDICINE

## 2018-04-21 LAB
ALBUMIN SERPL-MCNC: 4.1 G/DL (ref 3.4–5)
ALP SERPL-CCNC: 53 U/L (ref 40–150)
ALT SERPL W P-5'-P-CCNC: 29 U/L (ref 0–70)
AST SERPL W P-5'-P-CCNC: 18 U/L (ref 0–45)
BILIRUB DIRECT SERPL-MCNC: 0.2 MG/DL (ref 0–0.2)
BILIRUB SERPL-MCNC: 1 MG/DL (ref 0.2–1.3)
PROT SERPL-MCNC: 6.9 G/DL (ref 6.8–8.8)

## 2018-05-08 ENCOUNTER — OFFICE VISIT (OUTPATIENT)
Dept: FAMILY MEDICINE | Facility: CLINIC | Age: 43
End: 2018-05-08
Payer: COMMERCIAL

## 2018-05-08 VITALS
SYSTOLIC BLOOD PRESSURE: 109 MMHG | BODY MASS INDEX: 25.22 KG/M2 | HEIGHT: 73 IN | HEART RATE: 62 BPM | RESPIRATION RATE: 16 BRPM | TEMPERATURE: 97.7 F | OXYGEN SATURATION: 97 % | WEIGHT: 190.25 LBS | DIASTOLIC BLOOD PRESSURE: 80 MMHG

## 2018-05-08 DIAGNOSIS — Z00.00 ROUTINE GENERAL MEDICAL EXAMINATION AT A HEALTH CARE FACILITY: Primary | ICD-10-CM

## 2018-05-08 DIAGNOSIS — F10.21 ALCOHOL DEPENDENCE IN REMISSION (H): ICD-10-CM

## 2018-05-08 DIAGNOSIS — Z23 NEED FOR TDAP VACCINATION: ICD-10-CM

## 2018-05-08 PROCEDURE — 99396 PREV VISIT EST AGE 40-64: CPT | Mod: 25 | Performed by: FAMILY MEDICINE

## 2018-05-08 PROCEDURE — 90471 IMMUNIZATION ADMIN: CPT | Performed by: FAMILY MEDICINE

## 2018-05-08 PROCEDURE — 90715 TDAP VACCINE 7 YRS/> IM: CPT | Performed by: FAMILY MEDICINE

## 2018-05-08 NOTE — NURSING NOTE
Screening Questionnaire for Adult Immunization    Are you sick today?   No   Do you have allergies to medications, food, a vaccine component or latex?   No   Have you ever had a serious reaction after receiving a vaccination?   No   Do you have a long-term health problem with heart disease, lung disease, asthma, kidney disease, metabolic disease (e.g. diabetes), anemia, or other blood disorder?   No   Do you have cancer, leukemia, HIV/AIDS, or any other immune system problem?   No   In the past 3 months, have you taken medications that affect  your immune system, such as prednisone, other steroids, or anticancer drugs; drugs for the treatment of rheumatoid arthritis, Crohn s disease, or psoriasis; or have you had radiation treatments?   No   Have you had a seizure, or a brain or other nervous system problem?   No   During the past year, have you received a transfusion of blood or blood     products, or been given immune (gamma) globulin or antiviral drug?   No   For women: Are you pregnant or is there a chance you could become        pregnant during the next month?   No   Have you received any vaccinations in the past 4 weeks?   No     Immunization questionnaire answers were all negative.        Per orders of Dr. Bush, injection of Tdap (adacel) given by Niharika Barry. Patient instructed to remain in clinic for 15 minutes afterwards, and to report any adverse reaction to me immediately.    Due to injection administration, patient instructed to remain in clinic for 15 minutes  afterwards, and to report any adverse reaction to me immediately.    Screening performed by Niharika Barry on 5/8/2018 at 3:21 PM.

## 2018-05-08 NOTE — MR AVS SNAPSHOT
After Visit Summary   5/8/2018    Jaun Lobo    MRN: [id number]           Patient Information     Date Of Birth          1975        Visit Information        Provider Department      5/8/2018 2:40 PM Parker Bush MD [facility] [place] [facility]        Today's Diagnoses     Routine general medical examination at a health care facility    -  1    Need for Tdap vaccination          Care Instructions      Preventive Health Recommendations  Male Ages 40 to 49    Yearly exam:             See your health care provider every year in order to  o   Review health changes.   o   Discuss preventive care.    o   Review your medicines if your doctor has prescribed any.    You should be tested each year for STDs (sexually transmitted diseases) if you re at risk.     Have a cholesterol test every 5 years.     Have a colonoscopy (test for colon cancer) if someone in your family has had colon cancer or polyps before age 50.     After age 45, have a diabetes test (fasting glucose). If you are at risk for diabetes, you should have this test every 3 years.      Talk with your health care provider about whether or not a prostate cancer screening test (PSA) is right for you.    Shots: Get a flu shot each year. Get a tetanus shot every 10 years.     Nutrition:    Eat at least 5 servings of fruits and vegetables daily.     Eat whole-grain bread, whole-wheat pasta and brown rice instead of white grains and rice.     Talk to your provider about Calcium and Vitamin D.     Lifestyle    Exercise for at least 150 minutes a week (30 minutes a day, 5 days a week). This will help you control your weight and prevent disease.     Limit alcohol to one drink per day.     No smoking.     Wear sunscreen to prevent skin cancer.     See your dentist every six months for an exam and cleaning.              Follow-ups after your visit        Who to contact     If you have questions or need follow up information about today's  "clinic visit or your schedule please contact [facility] directly at [phone].  Normal or non-critical lab and imaging results will be communicated to you by MyChart, letter or phone within 4 business days after the clinic has received the results. If you do not hear from us within 7 days, please contact the clinic through [facility] or phone. If you have a critical or abnormal lab result, we will notify you by phone as soon as possible.  Submit refill requests through [facility] or call your pharmacy and they will forward the refill request to us. Please allow 3 business days for your refill to be completed.          Additional Information About Your Visit        [facility] Information     [facility] gives you secure access to your electronic health record. If you see a primary care provider, you can also send messages to your care team and make appointments. If you have questions, please call your primary care clinic.  If you do not have a primary care provider, please call [phone] and they will assist you.        Care EveryWhere ID     This is your Care EveryWhere ID. This could be used by other organizations to access your [facility] medical records  [id number]        Your Vitals Were     Pulse Temperature Respirations Height Pulse Oximetry BMI (Body Mass Index)    62 97.7  F (36.5  C) (Oral) 16 6' 1\" (1.854 m) 97% 25.1 kg/m2       Blood Pressure from Last 3 Encounters:   05/08/18 109/80   12/11/17 127/79   12/02/17 131/79    Weight from Last 3 Encounters:   05/08/18 190 lb 4 oz (86.3 kg)   12/11/17 190 lb (86.2 kg)   12/02/17 191 lb (86.6 kg)              We Performed the Following     TDAP VACCINE (ADACEL)        Primary Care Provider Office Phone # Fax #    Parker Komal Bush [phone] [address] [facility] [address]        Equal Access to Services     DAISY ZARAGOZA AH: Mohsen Tuttle, tato coffey, stacy wadsworth " fe tuttlerufinoalton hansenAlyceaawilmer ah. So [facility] [phone].    ATENCIÓN: Si ardenla julio, tiene a bowman disposición servicios gratuitos de asistencia lingüística. Sommer al [phone].    We comply with applicable federal civil rights laws and [place] laws. We do not discriminate on the basis of race, color, national origin, age, disability, sex, sexual orientation, or gender identity.            Thank you!     Thank you for choosing [facility]  for your care. Our goal is always to provide you with excellent care. Hearing back from our patients is one way we can continue to improve our services. Please take a few minutes to complete the written survey that you may receive in the mail after your visit with us. Thank you!             Your Updated Medication List - Protect others around you: Learn how to safely use, store and throw away your medicines at www.disposemymeds.org.          This list is accurate as of 5/8/18  3:11 PM.  Always use your most recent med list.                   Brand Name Dispense Instructions for use Diagnosis    albuterol 108 (90 Base) MCG/ACT Inhaler    PROAIR HFA/PROVENTIL HFA/VENTOLIN HFA     Inhale 2 puffs into the lungs every 4 hours as needed for shortness of breath / dyspnea or wheezing        CHANTIX PO      Take 1 mg by mouth        melatonin 3 MG tablet      Take 3 mg by mouth nightly as needed.    Insomnia       temazepam 7.5 MG capsule    RESTORIL    30 capsule    Take 1 capsule (7.5 mg) by mouth nightly as needed for sleep    Insomnia, unspecified

## 2018-05-08 NOTE — PROGRESS NOTES
SUBJECTIVE:   CC: Jaun Lobo is an 43 year old male who presents for preventative health visit.     Healthy Habits:  Answers for HPI/ROS submitted by the patient on 5/8/2018   Annual Exam:  Getting at least 3 servings of Calcium per day:: Yes  Bi-annual eye exam:: NO  Dental care twice a year:: Yes  Sleep apnea or symptoms of sleep apnea:: Daytime drowsiness  Taking medications regularly:: Yes  Medication side effects:: Not applicable  Additional concerns today:: No  PHQ-2 Score: 1    Today's PHQ-2 Score:   PHQ-2 ( 1999 Pfizer) 5/8/2018 9/13/2017   Q1: Little interest or pleasure in doing things - 0   Q2: Feeling down, depressed or hopeless - 0   PHQ-2 Score - 0   Q1: Little interest or pleasure in doing things Not at all -   Q2: Feeling down, depressed or hopeless Several days -       Abuse: Current or Past(Physical, Sexual or Emotional)- No  Do you feel safe in your environment - Yes    Social History   Substance Use Topics     Smoking status: Former Smoker     Smokeless tobacco: Never Used     Alcohol use No      Comment: 40 drinks a week , quit drinking 6 weeks ago       If you drink alcohol do you typically have >3 drinks per day or >7 drinks per week? No                      Last PSA: No results found for: PSA    Reviewed orders with patient. Reviewed health maintenance and updated orders accordingly - Yes  Labs reviewed in EPIC    Reviewed and updated as needed this visit by clinical staff    Reviewed and updated as needed this visit by Provider    Seeing addiction psychiatrist for alcohol use.   For last 3 months - using chantix for quitting alcohol as off label use.     ROS:  C: NEGATIVE for fever, chills, change in weight  I: NEGATIVE for worrisome rashes, moles or lesions  E: NEGATIVE for vision changes or irritation  ENT: NEGATIVE for ear, mouth and throat problems  R: NEGATIVE for significant cough or SOB  CV: NEGATIVE for chest pain, palpitations or peripheral edema  GI: NEGATIVE for nausea,  "abdominal pain, heartburn, or change in bowel habits   male: negative for dysuria, hematuria, decreased urinary stream, erectile dysfunction, urethral discharge  M: NEGATIVE for significant arthralgias or myalgia  N: NEGATIVE for weakness, dizziness or paresthesias  E: NEGATIVE for temperature intolerance, skin/hair changes  P: NEGATIVE for changes in mood or affect    OBJECTIVE:   /80 (BP Location: Right arm, Patient Position: Chair, Cuff Size: Adult Regular)  Pulse 62  Temp 97.7  F (36.5  C) (Oral)  Resp 16  Ht 6' 1\" (1.854 m)  Wt 190 lb 4 oz (86.3 kg)  SpO2 97%  BMI 25.1 kg/m2  EXAM:  GENERAL: healthy, alert and no distress  EYES: Eyes grossly normal to inspection, PERRL and conjunctivae and sclerae normal  HENT: ear canals and TM's normal, nose and mouth without ulcers or lesions  NECK: no adenopathy, no asymmetry, masses, or scars and thyroid normal to palpation  RESP: lungs clear to auscultation - no rales, rhonchi or wheezes  CV: regular rate and rhythm, normal S1 S2, no S3 or S4, no murmur, click or rub, no peripheral edema and peripheral pulses strong  ABDOMEN: soft, nontender, no hepatosplenomegaly, no masses and bowel sounds normal, umbilical hernia   (male): normal male genitalia without lesions or urethral discharge, no hernia  MS: no gross musculoskeletal defects noted, no edema  SKIN: multiple moles on back.   NEURO: Normal strength and tone, mentation intact and speech normal  PSYCH: mentation appears normal, affect normal/bright    ASSESSMENT/PLAN:   1. Routine general medical examination at a health care facility  Hold off on labs today.     2. Need for Tdap vaccination     - TDAP VACCINE (ADACEL)    3. Alcohol dependence in remission (HCC)  Seeing addiction psychiatrist. Getting chantix off label use and temazepam for sleep from him.       COUNSELING:  Reviewed preventive health counseling, as reflected in patient instructions  Special attention given to:        Regular exercise   " "    Healthy diet/nutrition       Vision screening       Hearing screening       Colon cancer screening       Prostate cancer screening       reports that he has quit smoking. He has never used smokeless tobacco.    Estimated body mass index is 24.9 kg/(m^2) as calculated from the following:    Height as of 12/11/17: 6' 1.25\" (1.861 m).    Weight as of 12/11/17: 190 lb (86.2 kg).       Counseling Resources:  ATP IV Guidelines  Pooled Cohorts Equation Calculator  FRAX Risk Assessment  ICSI Preventive Guidelines  Dietary Guidelines for Americans, 2010  USDA's MyPlate  ASA Prophylaxis  Lung CA Screening    Parker Bush MD, MD  Formerly named [facility]  "

## 2018-05-09 ASSESSMENT — PATIENT HEALTH QUESTIONNAIRE - PHQ9: SUM OF ALL RESPONSES TO PHQ QUESTIONS 1-9: 6

## 2019-03-27 ENCOUNTER — E-VISIT (OUTPATIENT)
Dept: FAMILY MEDICINE | Facility: CLINIC | Age: 44
End: 2019-03-27
Payer: COMMERCIAL

## 2019-03-27 DIAGNOSIS — T75.3XXA SEA SICKNESS, INITIAL ENCOUNTER: Primary | ICD-10-CM

## 2019-03-27 PROCEDURE — 99444 ZZC PHYSICIAN ONLINE EVALUATION & MANAGEMENT SERVICE: CPT | Performed by: FAMILY MEDICINE

## 2019-03-27 RX ORDER — SCOLOPAMINE TRANSDERMAL SYSTEM 1 MG/1
1 PATCH, EXTENDED RELEASE TRANSDERMAL
Qty: 2 PATCH | Refills: 0 | Status: SHIPPED | OUTPATIENT
Start: 2019-03-27 | End: 2020-01-07

## 2019-05-21 ENCOUNTER — OFFICE VISIT (OUTPATIENT)
Dept: FAMILY MEDICINE | Facility: CLINIC | Age: 44
End: 2019-05-21
Payer: COMMERCIAL

## 2019-05-21 VITALS
OXYGEN SATURATION: 97 % | HEIGHT: 71 IN | BODY MASS INDEX: 26.39 KG/M2 | SYSTOLIC BLOOD PRESSURE: 122 MMHG | TEMPERATURE: 98.3 F | WEIGHT: 188.5 LBS | HEART RATE: 85 BPM | DIASTOLIC BLOOD PRESSURE: 70 MMHG

## 2019-05-21 DIAGNOSIS — Z11.3 SCREENING FOR STDS (SEXUALLY TRANSMITTED DISEASES): ICD-10-CM

## 2019-05-21 DIAGNOSIS — Z13.6 SCREENING FOR CARDIOVASCULAR CONDITION: ICD-10-CM

## 2019-05-21 DIAGNOSIS — K76.0 FATTY LIVER: ICD-10-CM

## 2019-05-21 DIAGNOSIS — Z00.00 ROUTINE GENERAL MEDICAL EXAMINATION AT A HEALTH CARE FACILITY: Primary | ICD-10-CM

## 2019-05-21 PROCEDURE — 80061 LIPID PANEL: CPT | Performed by: FAMILY MEDICINE

## 2019-05-21 PROCEDURE — 87591 N.GONORRHOEAE DNA AMP PROB: CPT | Performed by: FAMILY MEDICINE

## 2019-05-21 PROCEDURE — 87491 CHLMYD TRACH DNA AMP PROBE: CPT | Performed by: FAMILY MEDICINE

## 2019-05-21 PROCEDURE — 36415 COLL VENOUS BLD VENIPUNCTURE: CPT | Performed by: FAMILY MEDICINE

## 2019-05-21 PROCEDURE — 99396 PREV VISIT EST AGE 40-64: CPT | Performed by: FAMILY MEDICINE

## 2019-05-21 PROCEDURE — 80053 COMPREHEN METABOLIC PANEL: CPT | Performed by: FAMILY MEDICINE

## 2019-05-21 PROCEDURE — 86803 HEPATITIS C AB TEST: CPT | Performed by: FAMILY MEDICINE

## 2019-05-21 PROCEDURE — 87389 HIV-1 AG W/HIV-1&-2 AB AG IA: CPT | Performed by: FAMILY MEDICINE

## 2019-05-21 PROCEDURE — 86780 TREPONEMA PALLIDUM: CPT | Performed by: FAMILY MEDICINE

## 2019-05-21 ASSESSMENT — ENCOUNTER SYMPTOMS
DIARRHEA: 0
PARESTHESIAS: 0
JOINT SWELLING: 0
FREQUENCY: 0
HEMATOCHEZIA: 0
HEARTBURN: 0
DIZZINESS: 0
SHORTNESS OF BREATH: 0
EYE PAIN: 0
SORE THROAT: 0
CONSTIPATION: 0
MYALGIAS: 0
FEVER: 0
PALPITATIONS: 0
NERVOUS/ANXIOUS: 0
ABDOMINAL PAIN: 0
HEMATURIA: 0
COUGH: 0
HEADACHES: 0
DYSURIA: 0
CHILLS: 0
WEAKNESS: 0
ARTHRALGIAS: 0
NAUSEA: 0

## 2019-05-21 ASSESSMENT — MIFFLIN-ST. JEOR: SCORE: 1754.22

## 2019-05-21 NOTE — PATIENT INSTRUCTIONS
Preventive Health Recommendations  Male Ages 40 to 49    Yearly exam:             See your health care provider every year in order to  o   Review health changes.   o   Discuss preventive care.    o   Review your medicines if your doctor has prescribed any.    You should be tested each year for STDs (sexually transmitted diseases) if you re at risk.     Have a cholesterol test every 5 years.     Have a colonoscopy (test for colon cancer) if someone in your family has had colon cancer or polyps before age 50.     After age 45, have a diabetes test (fasting glucose). If you are at risk for diabetes, you should have this test every 3 years.      Talk with your health care provider about whether or not a prostate cancer screening test (PSA) is right for you.    Shots: Get a flu shot each year. Get a tetanus shot every 10 years.     Nutrition:    Eat at least 5 servings of fruits and vegetables daily.     Eat whole-grain bread, whole-wheat pasta and brown rice instead of white grains and rice.     Get adequate Calcium and Vitamin D.     Lifestyle    Exercise for at least 150 minutes a week (30 minutes a day, 5 days a week). This will help you control your weight and prevent disease.     Limit alcohol to one drink per day.     No smoking.     Wear sunscreen to prevent skin cancer.     See your dentist every six months for an exam and cleaning.     Please call [phone] to schedule an appointment for ultrasound of your abdomen.

## 2019-05-21 NOTE — PROGRESS NOTES
SUBJECTIVE:   CC: Jaun Lobo is an 44 year old male who presents for preventative health visit.     Healthy Habits:     Getting at least 3 servings of Calcium per day:  Yes    Bi-annual eye exam:  NO    Dental care twice a year:  Yes    Sleep apnea or symptoms of sleep apnea:  None    Diet:  Regular (no restrictions)    Frequency of exercise:  4-5 days/week    Duration of exercise:  15-30 minutes    Taking medications regularly:  Yes    Medication side effects:  Not applicable    PHQ-2 Total Score: 0    Additional concerns today:  No    Today's PHQ-2 Score:   PHQ-2 ( 1999 Pfizer) 5/21/2019   Q1: Little interest or pleasure in doing things 0   Q2: Feeling down, depressed or hopeless 0   PHQ-2 Score 0   Q1: Little interest or pleasure in doing things Not at all   Q2: Feeling down, depressed or hopeless Not at all   PHQ-2 Score 0     Abuse: Current or Past(Physical, Sexual or Emotional)- No  Do you feel safe in your environment? Yes    Social History     Tobacco Use     Smoking status: Former Smoker     Smokeless tobacco: Never Used   Substance Use Topics     Alcohol use: No     Alcohol/week: 10.0 oz     Types: 20 Cans of beer per week     Comment: 40 drinks a week , quit drinking 6 weeks ago      If you drink alcohol do you typically have >3 drinks per day or >7 drinks per week? Yes       Alcohol Use 5/21/2019   Prescreen: >3 drinks/day or >7 drinks/week? No   Prescreen: >3 drinks/day or >7 drinks/week? -   No flowsheet data found.    Last PSA: No results found for: PSA    Reviewed orders with patient. Reviewed health maintenance and updated orders accordingly - Yes  Labs reviewed in EPIC    Reviewed and updated as needed this visit by clinical staff    Reviewed and updated as needed this visit by Provider    He picks lots of wild vegetables including watercress and he feels he could have liver worms from it and his previous liver US was suggestive of those changes. Admits he was drinking alcohol at that time but  "still does not feel like it was that much that can affect his liver.  Would like to have his liver ultrasound again.  Also once liver lab results.       Review of Systems   Constitutional: Negative for chills and fever.   HENT: Negative for congestion, ear pain, hearing loss and sore throat.    Eyes: Negative for pain and visual disturbance.   Respiratory: Negative for cough and shortness of breath.    Cardiovascular: Negative for chest pain, palpitations and peripheral edema.   Gastrointestinal: Negative for abdominal pain, constipation, diarrhea, heartburn, hematochezia and nausea.   Genitourinary: Negative for discharge, dysuria, frequency, genital sores, hematuria, impotence and urgency.   Musculoskeletal: Negative for arthralgias, joint swelling and myalgias.   Skin: Negative for rash.   Neurological: Negative for dizziness, weakness, headaches and paresthesias.   Psychiatric/Behavioral: Negative for mood changes. The patient is not nervous/anxious.           OBJECTIVE:   /70   Pulse 85   Temp 98.3  F (36.8  C) (Oral)   Ht 1.791 m (5' 10.5\")   Wt 85.5 kg (188 lb 8 oz)   SpO2 97%   BMI 26.66 kg/m      Physical Exam  GENERAL: healthy, alert and no distress  EYES: Eyes grossly normal to inspection, PERRL and conjunctivae and sclerae normal  HENT: ear canals and TM's normal, nose and mouth without ulcers or lesions  NECK: no adenopathy, no asymmetry, masses, or scars and thyroid normal to palpation  RESP: lungs clear to auscultation - no rales, rhonchi or wheezes  CV: regular rate and rhythm, normal S1 S2, no S3 or S4, no murmur, click or rub, no peripheral edema and peripheral pulses strong  ABDOMEN: soft, nontender, no hepatosplenomegaly, no masses and bowel sounds normal, small umbilical hernia   -both testicles descended, no inguinal hernia, no testicular lump or mass.  MS: no gross musculoskeletal defects noted, no edema  SKIN: no suspicious lesions or rashes  NEURO: Normal strength and tone, " "mentation intact and speech normal  PSYCH: mentation appears normal, affect normal/bright       ASSESSMENT/PLAN:   1. Routine general medical examination at a health care facility       2. Fatty liver  Discussed repeating ultrasound may not be quite helpful as long as the repeat liver enzymes are within normal limit.  He does not drink alcohol anymore.  He is reassured by the response but as per his request I am going to order liver ultrasound for him.  He is going to hold off on it unless his liver enzymes are abnormal.  He agreed.  - US Abdomen Limited; Future  - Comprehensive metabolic panel       4. Screening for cardiovascular condition     - Lipid panel reflex to direct LDL Fasting    5. Screening for STDs (sexually transmitted diseases)     - NEISSERIA GONORRHOEA PCR  - CHLAMYDIA TRACHOMATIS PCR  - HIV Antigen Antibody Combo  - Treponema Abs w Reflex to RPR and Titer  - Hepatitis C Screen Reflex to HCV RNA Quant and Genotype    COUNSELING:   Reviewed preventive health counseling, as reflected in patient instructions  Special attention given to:        Regular exercise       Healthy diet/nutrition       Vision screening       Hearing screening       Colon cancer screening       Prostate cancer screening    Estimated body mass index is 26.66 kg/m  as calculated from the following:    Height as of this encounter: 1.791 m (5' 10.5\").    Weight as of this encounter: 85.5 kg (188 lb 8 oz).     Weight management plan: Discussed healthy diet and exercise guidelines     reports that he has quit smoking. He has never used smokeless tobacco.      Counseling Resources:  ATP IV Guidelines  Pooled Cohorts Equation Calculator  FRAX Risk Assessment  ICSI Preventive Guidelines  Dietary Guidelines for Americans, 2010  USDA's MyPlate  ASA Prophylaxis  Lung CA Screening    Parker Bush MD, MD  [facility]  "

## 2019-05-22 LAB
ALBUMIN SERPL-MCNC: 4.4 G/DL (ref 3.4–5)
ALP SERPL-CCNC: 53 U/L (ref 40–150)
ALT SERPL W P-5'-P-CCNC: 26 U/L (ref 0–70)
ANION GAP SERPL CALCULATED.3IONS-SCNC: 7 MMOL/L (ref 3–14)
AST SERPL W P-5'-P-CCNC: 23 U/L (ref 0–45)
BILIRUB SERPL-MCNC: 1 MG/DL (ref 0.2–1.3)
BUN SERPL-MCNC: 11 MG/DL (ref 7–30)
CALCIUM SERPL-MCNC: 9 MG/DL (ref 8.5–10.1)
CHLORIDE SERPL-SCNC: 103 MMOL/L (ref 94–109)
CHOLEST SERPL-MCNC: 182 MG/DL
CO2 SERPL-SCNC: 28 MMOL/L (ref 20–32)
CREAT SERPL-MCNC: 0.86 MG/DL (ref 0.66–1.25)
GFR SERPL CREATININE-BSD FRML MDRD: >90 ML/MIN/{1.73_M2}
GLUCOSE SERPL-MCNC: 84 MG/DL (ref 70–99)
HCV AB SERPL QL IA: NONREACTIVE
HDLC SERPL-MCNC: 57 MG/DL
HIV 1+2 AB+HIV1 P24 AG SERPL QL IA: NONREACTIVE
LDLC SERPL CALC-MCNC: 114 MG/DL
NONHDLC SERPL-MCNC: 125 MG/DL
POTASSIUM SERPL-SCNC: 4.5 MMOL/L (ref 3.4–5.3)
PROT SERPL-MCNC: 7.5 G/DL (ref 6.8–8.8)
SODIUM SERPL-SCNC: 138 MMOL/L (ref 133–144)
TRIGL SERPL-MCNC: 53 MG/DL

## 2019-05-24 LAB — T PALLIDUM AB SER QL: NONREACTIVE

## 2019-08-29 ENCOUNTER — NURSE TRIAGE (OUTPATIENT)
Dept: FAMILY MEDICINE | Facility: CLINIC | Age: 44
End: 2019-08-29

## 2019-08-29 NOTE — TELEPHONE ENCOUNTER
"  Additional Information    Negative: Bluish (or gray) lips or face    Negative: Severe difficulty breathing (e.g., struggling for each breath, speaks in single words)    Negative: Rapid onset of cough and has hives    Negative: Coughing started suddenly after medicine, an allergic food or bee sting    Negative: Difficulty breathing after exposure to flames, smoke, or fumes    Negative: Sounds like a life-threatening emergency to the triager    Negative: Previous asthma attacks and this feels like asthma attack    Negative: Chest pain present when not coughing    Negative: Difficulty breathing    Negative: Passed out (i.e., fainted, collapsed and was not responding)    Answer Assessment - Initial Assessment Questions  1. ONSET: \"When did the cough begin?\"       Going on for 3 weeks, was getting better , last few days worse, losing voice, \"bad hack that produces nothing, low grade achiness\" whole body aches, no obvious temp but night sweats  2. SEVERITY: \"How bad is the cough today?\"     A couple times  A minute  3. RESPIRATORY DISTRESS: \"Describe your breathing.\"       Feels like lung capacity \"impaired, feels like asthma\"  4. FEVER: \"Do you have a fever?\" If so, ask: \"What is your temperature, how was it measured, and when did it start?\"      As above , no temp but has night sweats   5. HEMOPTYSIS: \"Are you coughing up any blood?\" If so ask: \"How much?\" (flecks, streaks, tablespoons, etc.)      no  6. TREATMENT: \"What have you done so far to treat the cough?\" (e.g., meds, fluids, humidifier)      Taking guiafenesin twice daily, producing nothing. Taking allegra  7. CARDIAC HISTORY: \"Do you have any history of heart disease?\" (e.g., heart attack, congestive heart failure)       no  8. LUNG HISTORY: \"Do you have any history of lung disease?\"  (e.g., pulmonary embolus, asthma, emphysema)      Asthma as a kid. Does have albuterol inhaler at home still- he hasn't tried it  9. PE RISK FACTORS: \"Do you have a history of " "blood clots?\" (or: recent major surgery, recent prolonged travel, bedridden )      no  10. OTHER SYMPTOMS: \"Do you have any other symptoms? (e.g., runny nose, wheezing, chest pain)        Runny nose  11. PREGNANCY: \"Is there any chance you are pregnant?\" \"When was your last menstrual period?\"        na  12. TRAVEL: \"Have you traveled out of the country in the last month?\" (e.g., travel history, exposures)        no    Protocols used: COUGH-A-OH      Advised UC today. He agrees.    Abby Escobar, RN, BSN       "

## 2019-08-29 NOTE — TELEPHONE ENCOUNTER
Reason for call:  Patient reporting a symptom    Symptom or request: See below    Duration (how long have symptoms been present): 3 weeks    Have you been treated for this before? No    Additional comments: Patient called to schedule an appointment but there are no openings at  or  until next week Tuesday. States he is concerned he may have pneumonia. SX are dry cough, body aches, hard time breathing - similar to the feeling of asthma for the past 3 weeks. Please return call to advise.    Phone Number patient can be reached at:  Home number on file [phone] (home)    Best Time:  Any    Can we leave a detailed message on this number:  YES    Call taken on 8/29/2019 at 10:08 AM by Zo Turner

## 2019-11-06 ENCOUNTER — HEALTH MAINTENANCE LETTER (OUTPATIENT)
Age: 44
End: 2019-11-06

## 2019-11-12 ENCOUNTER — MYC MEDICAL ADVICE (OUTPATIENT)
Dept: FAMILY MEDICINE | Facility: CLINIC | Age: 44
End: 2019-11-12

## 2019-11-12 ENCOUNTER — TELEPHONE (OUTPATIENT)
Dept: FAMILY MEDICINE | Facility: CLINIC | Age: 44
End: 2019-11-12

## 2019-11-12 DIAGNOSIS — Z83.511 FAMILY HISTORY OF GLAUCOMA: ICD-10-CM

## 2019-11-12 DIAGNOSIS — R68.89 LIGHT SENSITIVITY: Primary | ICD-10-CM

## 2019-11-12 NOTE — TELEPHONE ENCOUNTER
Reason for Call:  Other call back    Detailed comments: Pt would like to have eye exam and would like to know where he can go , can he come to clinic to get it done or can clinic recommend a good place and he is experiencing light sensitivity and glaucoma runs in his extended family so he is concerned . Please Advise    Phone Number Patient can be reached at: Work number on file:  [phone] (work)    Best Time: ASAP    Can we leave a detailed message on this number? YES Pt said PLEASE leave detailed message it is his cell phone .     Call taken on 11/12/2019 at 9:40 AM by Savannah Henao CNA

## 2019-11-12 NOTE — TELEPHONE ENCOUNTER
Work number did not have identified voicemail.    Writer called home number:  Left message to call back and ask to speak with an available triage nurse, or may check [email or web address] messages.    [email or web address] message sent to patient with referral information.

## 2019-11-12 NOTE — TELEPHONE ENCOUNTER
Dr Bush,    Do you have specific recommendations or just go where his insurance advises?    Ophthalmology referral queued if you want to sign    Abby Escobar RN, BSN

## 2019-11-14 NOTE — TELEPHONE ENCOUNTER
Pt in agreement with plan and verbalized understanding. Writer gave patient referral information.    Maci Dallas

## 2019-12-02 ENCOUNTER — NURSE TRIAGE (OUTPATIENT)
Dept: FAMILY MEDICINE | Facility: CLINIC | Age: 44
End: 2019-12-02

## 2019-12-02 NOTE — TELEPHONE ENCOUNTER
After writer recommended disposition of ER, patient stated he is currently in [place] but has a flight home tonight.  Writer strongly encouraged patient to seek medical care locally.  Patient stated he hears advice but will likely not follow advise.  Patient strongly encouraged to follow up in Emergency Room upon return to [place].      Reason for Disposition    Dangerous mechanism of injury (e.g., MVA, contact sports, diving, fall on trampoline, fall > 10 feet or 3 meters) and neck pain or stiffness began > 1 hour after injury    Additional Information    Negative: Shock suspected (e.g., cold/pale/clammy skin, too weak to stand, low BP, rapid pulse)    Negative: Similar pain previously and it was from 'heart attack'    Negative: Similar pain previously from 'angina' and not relieved by nitroglycerin    Negative: Difficult to awaken or acting confused (e.g., disoriented, slurred speech)    Negative: Sounds like a life-threatening emergency to the triager    Negative: Difficulty breathing or unusual sweating (e.g., sweating without exertion)    Negative: Chest pain lasting longer than 5 minutes    Negative: Stiff neck (can't touch chin to chest) and has headache    Negative: Stiff neck (can't touch chin to chest) and fever    Negative: Weakness of an arm or hand    Negative: Problems with bowel or bladder control    Negative: Patient sounds very sick or weak to the triager    Followed an injury to neck (e.g., MVA, sports, impact or collision)    Negative: Dangerous mechanism of injury (e.g., MVA, contact sports, diving, fall on trampoline, fall > 10 feet or 3 meters) (EXCEPTION: neck pain began > 1 hour after injury)    Negative: Weakness of arms or legs    Negative: Numbness, tingling, or burning of arms, upper back/chest or legs (EXCEPTION: brief, now gone)    Negative: Major bleeding (actively dripping or spurting) that can't be stopped    Negative: Bullet, knife or other serious penetrating wound    Negative:  "Difficulty breathing (e.g., choking or stridor)    Negative: Knocked out (unconscious) > 1 minute    Negative: Direct blow to front of neck and cough, hoarseness, abnormal voice, or can't talk    Negative: Sounds like a serious injury to the triager    Negative: Sounds like a life-threatening emergency to the triager    Negative: Neck pain not from an injury    Answer Assessment - Initial Assessment Questions  1. MECHANISM: \"How did the injury happen?\" (Consider the possibility of domestic violence or elder abuse)      Hit head on a shelf about 2 weeks ago and neck pain started about 1.5 weeks ago.  The last four days neck in \"serious\" neck pain.  2. ONSET: \"When did the injury happen?\" (Minutes or hours ago)      Two weeks ago  3. LOCATION: \"What part of the neck is injured?\"      Upper, R > L  4. SEVERITY: \"Can you move the neck normally?\"      No.  Can move head up and down and slightly down and to the left but not able to move head to right side.    5. PAIN: \"Is there any pain?\" If so, ask: \"How bad is the pain?\"   (Scale 1-10; or mild, moderate, severe)      No movement, no pain.  Pain is severe with movement and will bring him \"to the floor.\"  6. CORD SYMPTOMS: \"Any weakness or numbness of the arms or legs?\"      No  7. SIZE: For cuts, bruises, or swelling, ask: \"How large is it?\" (e.g., inches or centimeters)       N/A  8. TETANUS: For any breaks in the skin, ask: \"When was the last tetanus booster?\"      N/A  9. OTHER SYMPTOMS: \"Do you have any other symptoms?\" (e.g., headache)      Headache at end of day each day since neck pain started.  Currently no injury  10. PREGNANCY: \"Is there any chance you are pregnant?\" \"When was your last menstrual period?\"        N/A    Protocols used: NECK INJURY-A-OH, NECK PAIN OR [id number]      NEELAM DonovanN, RN    "

## 2019-12-02 NOTE — TELEPHONE ENCOUNTER
Reason for call:  Patient reporting a symptom    Symptom or request: Neck pain     Duration (how long have symptoms been present): 4 days     Have you been treated for this before? Na    Additional comments: Patient states that he has history of back problems & would like a referral to see a spine specialist or get an MRI. Patient declined an appt at this time & said he thinks there's very little that can be done in the clinic. Please advise, thank you!    Phone Number patient can be reached at:  Home number on file [phone] (home)    Best Time:  anytime    Can we leave a detailed message on this number:  YES    Call taken on 12/2/2019 at 9:40 AM by Maci Omalley

## 2019-12-18 ENCOUNTER — NURSE TRIAGE (OUTPATIENT)
Dept: FAMILY MEDICINE | Facility: CLINIC | Age: 44
End: 2019-12-18

## 2019-12-18 NOTE — TELEPHONE ENCOUNTER
Attempted to call patient, male voice answered stating he is friend of Jaun. Asked to speak with patient and phone call was hung up    Thank You!  Citlali Nicole, RN  Triage Nurse

## 2019-12-18 NOTE — TELEPHONE ENCOUNTER
Reason for call:  Patient reporting a symptom    Symptom or request: L hand/ pinky finger    Duration (how long have symptoms been present): happened Sunday evening    Have you been treated for this before? No    Additional comments: Pt wanted to be seen ASAP. Pt cut finger through the nail- states not too much bleeding and feels he does not need to go to UC. Based on symptoms writer suggested triage since pt is refusing to go to UC/ED.     Phone Number patient can be reached at:  Home number on file [phone] (home)    Best Time:  anytime    Can we leave a detailed message on this number:  YES    Call taken on 12/18/2019 at 11:52 AM by Leticia Vogel

## 2019-12-18 NOTE — TELEPHONE ENCOUNTER
Spoke with patient. Patient states on Sunday night he was cutting up carrots. He ended up slicing off a piece of his finger. He states it was close to the bone, but he did not hit the bone. He cut through his finger nail. He said he and family looked a the piece of finger and he did not think it was urgent enough to go to ED.     Patient states bleeding has been controlled, but he does not think he is getting any better. If he removes the band aid it will bleed. He is requesting an appointment     Writer advised patient to go to UC as they will be able to treat wound better. Patient agreeable. He states he will go to [facility] when it opens    Thank You!  Citlali Nicole, RN  Triage Nurse

## 2020-01-07 ENCOUNTER — OFFICE VISIT (OUTPATIENT)
Dept: OPHTHALMOLOGY | Facility: CLINIC | Age: 45
End: 2020-01-07
Attending: FAMILY MEDICINE
Payer: COMMERCIAL

## 2020-01-07 DIAGNOSIS — H52.11 MYOPIA, RIGHT EYE: ICD-10-CM

## 2020-01-07 DIAGNOSIS — H53.10 SUBJECTIVE VISION DISTURBANCE, BILATERAL: Primary | ICD-10-CM

## 2020-01-07 DIAGNOSIS — H18.529 ANTERIOR BASEMENT MEMBRANE DYSTROPHY: ICD-10-CM

## 2020-01-07 ASSESSMENT — REFRACTION
OS_SPHERE: PLANO
OD_CYLINDER: SPHERE
OS_CYLINDER: SPHERE
OD_SPHERE: -0.50

## 2020-01-07 ASSESSMENT — TONOMETRY
OD_IOP_MMHG: 18
IOP_METHOD: ICARE
OS_IOP_MMHG: 16

## 2020-01-07 ASSESSMENT — CONF VISUAL FIELD
METHOD: COUNTING FINGERS
OS_NORMAL: 1
OD_NORMAL: 1

## 2020-01-07 ASSESSMENT — EXTERNAL EXAM - LEFT EYE: OS_EXAM: NORMAL

## 2020-01-07 ASSESSMENT — REFRACTION_MANIFEST
OD_CYLINDER: SPHERE
OS_CYLINDER: SPHERE
OD_SPHERE: PLANO
OS_SPHERE: PLANO

## 2020-01-07 ASSESSMENT — SLIT LAMP EXAM - LIDS: COMMENTS: NORMAL

## 2020-01-07 ASSESSMENT — VISUAL ACUITY
METHOD: SNELLEN - LINEAR
OS_SC: 20/20
OD_SC: 20/20
OD_SC+: -

## 2020-01-07 ASSESSMENT — EXTERNAL EXAM - RIGHT EYE: OD_EXAM: NORMAL

## 2020-01-07 ASSESSMENT — CUP TO DISC RATIO
OD_RATIO: 0.3
OS_RATIO: 0.3

## 2020-01-07 NOTE — PROGRESS NOTES
History  HPI     Annual Eye Exam     In both eyes.  Onset was gradual.  This started years ago.  Occurring constantly.  Since onset it is stable.  Associated symptoms include photophobia.  Treatments tried include no treatments.  Pain was noted as 0/10.              Comments     Light sensitivity in last few years, wearing sunglasses more often and headlights very bothersome. Patient states vision has been stable since last eye exam, both eyes. Denies eye pain or irritation. Does not take eye drops.    Corneal abrasion, had laser surgery to correct it. Some irritation occasionally.      Gianna Walker COT 9:23 AM January 7, 2020             Last edited by Gianna Walker on 1/7/2020  9:24 AM. (History)          Assessment/Plan  (H53.10) Subjective vision disturbance, bilateral  (primary encounter diagnosis)  Comment: Noticing light sensitivity and afterimages  Plan:  Educated patient on condition and clinical findings. Recommended sunglasses as needed to improve symptoms. Monitor as needed.    (H52.11) Myopia, right eye  Comment: Good uncorrected acuity, no significant refractive error  Plan: REFRACTION         No spectacle prescription recommended. Monitor annually.    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 Monitor annually.    Return to clinic in 1 year for comprehensive eye exam.    Complete documentation of historical and exam elements from today's encounter can  be found in the full encounter summary report (not reduplicated in this progress  note). I personally obtained the chief complaint(s) and history of present illness. I  confirmed and edited as necessary the review of systems, past medical/surgical  history, family history, social history, and examination findings as documented by  others; and I examined the patient myself. I personally reviewed the relevant  tests,  images, and reports as documented above. I formulated and edited as necessary the  assessment and plan and discussed the findings and management plan with the  patient and family.    Thai Cesar OD, FAAO

## 2020-01-07 NOTE — NURSING NOTE
Chief Complaints and History of Present Illnesses   Patient presents with     Annual Eye Exam     Chief Complaint(s) and History of Present Illness(es)     Annual Eye Exam     Laterality: both eyes    Onset: gradual    Onset: years ago    Frequency: constantly    Course: stable    Associated symptoms: photophobia    Treatments tried: no treatments    Pain scale: 0/10              Comments     Light sensitivity in last few years, wearing sunglasses more often and headlights very bothersome. Patient states vision has been stable since last eye exam, both eyes. Denies eye pain or irritation. Does not take eye drops.    Corneal abrasion, had laser surgery to correct it. Some irritation occasionally.      Gianna Walker COT 9:23 AM January 7, 2020

## 2020-01-13 ENCOUNTER — NURSE TRIAGE (OUTPATIENT)
Dept: NURSING | Facility: CLINIC | Age: 45
End: 2020-01-13

## 2020-01-13 NOTE — TELEPHONE ENCOUNTER
I connected the patient with scheduling, for an appointment. Over the counter meds aren't bringing pain relief.  Marina Rodriguez RN-[facility]      Reason for Disposition    [1] SEVERE pain AND [2] not improved 2 hours after taking analgesic medication (e.g., ibuprofen or acetaminophen)     Pain at 8    Additional Information    Negative: Moving the earlobe or touching the ear clearly increases the pain    Negative: Foreign body struck in the ear (e.g., bug, piece of cotton)    Negative: Followed an ear injury    Negative: [1] Recently diagnosed with otitis media AND [2] currently on oral antibiotics    Negative: [1] Stiff neck (unable to touch chin to chest) AND [2] fever    Negative: [1] Bony area of skull behind the ear is pink or swollen AND [2] fever    Negative: Fever > 104 F (40 C)    Negative: Patient sounds very sick or weak to the triager    Protocols used: EARACHE-A-AH

## 2020-01-16 ENCOUNTER — OFFICE VISIT (OUTPATIENT)
Dept: FAMILY MEDICINE | Facility: CLINIC | Age: 45
End: 2020-01-16
Payer: COMMERCIAL

## 2020-01-16 VITALS
HEART RATE: 93 BPM | DIASTOLIC BLOOD PRESSURE: 76 MMHG | OXYGEN SATURATION: 98 % | BODY MASS INDEX: 27.44 KG/M2 | WEIGHT: 196 LBS | RESPIRATION RATE: 18 BRPM | SYSTOLIC BLOOD PRESSURE: 138 MMHG | TEMPERATURE: 98 F | HEIGHT: 71 IN

## 2020-01-16 DIAGNOSIS — R05.9 COUGH: ICD-10-CM

## 2020-01-16 DIAGNOSIS — J01.90 ACUTE NON-RECURRENT SINUSITIS, UNSPECIFIED LOCATION: ICD-10-CM

## 2020-01-16 DIAGNOSIS — H66.92 ACUTE LEFT OTITIS MEDIA: Primary | ICD-10-CM

## 2020-01-16 PROCEDURE — 99213 OFFICE O/P EST LOW 20 MIN: CPT | Performed by: PHYSICIAN ASSISTANT

## 2020-01-16 RX ORDER — CODEINE PHOSPHATE AND GUAIFENESIN 10; 100 MG/5ML; MG/5ML
1-2 SOLUTION ORAL EVERY 4 HOURS PRN
Qty: 120 ML | Refills: 0 | Status: SHIPPED | OUTPATIENT
Start: 2020-01-16 | End: 2020-12-04

## 2020-01-16 ASSESSMENT — ANXIETY QUESTIONNAIRES
6. BECOMING EASILY ANNOYED OR IRRITABLE: NOT AT ALL
GAD7 TOTAL SCORE: 0
5. BEING SO RESTLESS THAT IT IS HARD TO SIT STILL: NOT AT ALL
1. FEELING NERVOUS, ANXIOUS, OR ON EDGE: NOT AT ALL
3. WORRYING TOO MUCH ABOUT DIFFERENT THINGS: NOT AT ALL
2. NOT BEING ABLE TO STOP OR CONTROL WORRYING: NOT AT ALL
7. FEELING AFRAID AS IF SOMETHING AWFUL MIGHT HAPPEN: NOT AT ALL

## 2020-01-16 ASSESSMENT — MIFFLIN-ST. JEOR: SCORE: 1788.24

## 2020-01-16 ASSESSMENT — PATIENT HEALTH QUESTIONNAIRE - PHQ9: 5. POOR APPETITE OR OVEREATING: NOT AT ALL

## 2020-01-16 NOTE — PROGRESS NOTES
"Subjective     Jaun Lobo is a 44 year old male who presents to clinic today for the following health issues:    HPI     RESPIRATORY SYMPTOMS      Duration: x1 week     Description  Sinus pain, nasal congestion, green nasal discharge, left ear ache, discomfort at night, sore throat, and cough      Severity: mild    Accompanying signs and symptoms: None    History (predisposing factors):  None    Therapies tried and outcome:  Robitussin, Tylenol, decongestants       Review of Systems   ROS COMP: Constitutional, HEENT, cardiovascular, pulmonary, gi and gu systems are negative, except as otherwise noted.      Objective    /76 (BP Location: Right arm, Patient Position: Sitting, Cuff Size: Adult Regular)   Pulse 93   Temp 98  F (36.7  C) (Oral)   Resp 18   Ht 1.791 m (5' 10.5\")   Wt 88.9 kg (196 lb)   SpO2 98%   BMI 27.73 kg/m    Body mass index is 27.73 kg/m .  Physical Exam  Vitals signs and nursing note reviewed.   Constitutional:       Appearance: Normal appearance.   HENT:      Head: Normocephalic and atraumatic.      Right Ear: Tympanic membrane, ear canal and external ear normal.      Left Ear: Ear canal normal. Tympanic membrane is erythematous and bulging.      Nose: Nose normal.      Mouth/Throat:      Mouth: Mucous membranes are moist.   Eyes:      Extraocular Movements: Extraocular movements intact.      Conjunctiva/sclera: Conjunctivae normal.      Pupils: Pupils are equal, round, and reactive to light.   Neck:      Musculoskeletal: Normal range of motion and neck supple.   Cardiovascular:      Rate and Rhythm: Normal rate and regular rhythm.      Heart sounds: Normal heart sounds.   Pulmonary:      Effort: Pulmonary effort is normal.      Breath sounds: Normal breath sounds.   Lymphadenopathy:      Cervical: No cervical adenopathy.   Skin:     General: Skin is warm and dry.   Neurological:      Mental Status: He is alert and oriented to person, place, and time.   Psychiatric:         Mood " and Affect: Mood normal.         Behavior: Behavior normal.          Diagnostic Test Results:  none         Assessment & Plan     1. Acute left otitis media  - Push fluids and rest  - amoxicillin-clavulanate (AUGMENTIN) 875-125 MG tablet; Take 1 tablet by mouth 2 times daily for 7 days  Dispense: 14 tablet; Refill: 0    2. Acute non-recurrent sinusitis, unspecified location  - amoxicillin-clavulanate (AUGMENTIN) 875-125 MG tablet; Take 1 tablet by mouth 2 times daily for 7 days  Dispense: 14 tablet; Refill: 0    3. Cough  - guaiFENesin-codeine (ROBITUSSIN AC) 100-10 MG/5ML solution; Take 5-10 mLs by mouth every 4 hours as needed for cough  Dispense: 120 mL; Refill: 0     Return for if not improving or worsening.    Marcial Sotomayor PACharleneC  [facility]

## 2020-01-17 ASSESSMENT — ANXIETY QUESTIONNAIRES: GAD7 TOTAL SCORE: 0

## 2020-02-20 ENCOUNTER — TELEPHONE (OUTPATIENT)
Dept: FAMILY MEDICINE | Facility: CLINIC | Age: 45
End: 2020-02-20

## 2020-02-20 DIAGNOSIS — D22.9 CHANGE IN MOLE: Primary | ICD-10-CM

## 2020-02-20 NOTE — TELEPHONE ENCOUNTER
Reason for Call: Request for an order or referral:    Order or referral being requested: referral    Date needed: as soon as possible    Has the patient been seen by the PCP for this problem? NO    Additional comments: patient has a mole that has changed drastically    Phone number Patient can be reached at:  Home number on file [phone] (home)    Best Time:  any    Can we leave a detailed message on this number?  YES    Call taken on 2/20/2020 at 2:30 PM by Michelle Parra

## 2020-02-21 NOTE — TELEPHONE ENCOUNTER
Writer spoke with patient and referral information given to patient. Patient in agreement with plan and verbalizes understanding.   Thanks!   Vonnie Tiwari RN

## 2020-10-05 ENCOUNTER — VIRTUAL VISIT (OUTPATIENT)
Dept: URGENT CARE | Facility: CLINIC | Age: 45
End: 2020-10-05
Payer: COMMERCIAL

## 2020-10-05 DIAGNOSIS — Z20.822 COVID-19 RULED OUT: Primary | ICD-10-CM

## 2020-10-05 PROCEDURE — 99213 OFFICE O/P EST LOW 20 MIN: CPT | Mod: TEL | Performed by: NURSE PRACTITIONER

## 2020-10-05 NOTE — PROGRESS NOTES
SUBJECTIVE:   Jaun Lobo is a 45 year old male presenting with a chief complaint of covid testing  Traveling 11 days in high rate area in [place] has had a lot of close exposure.  Wife is pregnant  Would like covid testing    Past Medical History:   Diagnosis Date     Alcohol dependence (H)      Allergic rhinitis, cause unspecified     allergy shots as kid     Anxiety state, unspecified     on Maryellen's     Back disorder     degenerative      DDD (degenerative disc disease)      Esophagitis      Lichen planus   2007     MEDICAL HISTORY OF - 3/10/10    ulcerative esophagitis     Mild intermittent asthma     rare use of albuteral     Tobacco use disorder     quit 2002     Unspecified hemorrhoids without mention of complication      Current Outpatient Medications   Medication Sig Dispense Refill     albuterol (PROAIR HFA, PROVENTIL HFA, VENTOLIN HFA) 108 (90 BASE) MCG/ACT inhaler Inhale 2 puffs into the lungs every 4 hours as needed for shortness of breath / dyspnea or wheezing       guaiFENesin-codeine (ROBITUSSIN AC) 100-10 MG/5ML solution Take 5-10 mLs by mouth every 4 hours as needed for cough 120 mL 0     melatonin 3 MG tablet Take 3 mg by mouth nightly as needed.       temazepam (RESTORIL) 7.5 MG capsule Take 1 capsule (7.5 mg) by mouth nightly as needed for sleep 30 capsule 2     Varenicline Tartrate (CHANTIX PO) Take 1 mg by mouth       Social History     Tobacco Use     Smoking status: Former Smoker     Smokeless tobacco: Never Used   Substance Use Topics     Alcohol use: No     Alcohol/week: 16.7 standard drinks     Types: 20 Cans of beer per week     Comment: 40 drinks a week , quit drinking 6 weeks ago        ROS:  CONSTITUTIONAL:NEGATIVE for fever, chills, change in weight  INTEGUMENTARY/SKIN: NEGATIVE for worrisome rashes, moles or lesions  EYES: NEGATIVE for vision changes or irritation  ENT/MOUTH: NEGATIVE for ear, mouth and throat problems  RESP:NEGATIVE for significant cough or  SOB    OBJECTIVE:  GENERAL APPEARANCE:  alert and no distress  RESP: lungs clear   CV: regular rates and rhythm  SKIN: no suspicious lesions or rashes    ASSESSMENT:  (Z03.818) COVID-19 ruled out  (primary encounter diagnosis)    Plan: Asymptomatic COVID-19 Virus (Coronavirus) by PCR  Isolate 10 days  Monitor for symptoms.     Telephone time spent 11 minutes    PHANI Zheng CNP

## 2020-10-07 ENCOUNTER — AMBULATORY - HEALTHEAST (OUTPATIENT)
Dept: FAMILY MEDICINE | Facility: CLINIC | Age: 45
End: 2020-10-07

## 2020-10-07 DIAGNOSIS — Z20.822 COVID-19 RULED OUT: ICD-10-CM

## 2020-10-08 ENCOUNTER — AMBULATORY - HEALTHEAST (OUTPATIENT)
Dept: FAMILY MEDICINE | Facility: CLINIC | Age: 45
End: 2020-10-08

## 2020-10-08 DIAGNOSIS — Z20.822 COVID-19 RULED OUT: ICD-10-CM

## 2020-10-10 ENCOUNTER — COMMUNICATION - HEALTHEAST (OUTPATIENT)
Dept: SCHEDULING | Facility: CLINIC | Age: 45
End: 2020-10-10

## 2020-10-14 ENCOUNTER — COMMUNICATION - HEALTHEAST (OUTPATIENT)
Dept: SCHEDULING | Facility: CLINIC | Age: 45
End: 2020-10-14

## 2020-10-14 ENCOUNTER — NURSE TRIAGE (OUTPATIENT)
Dept: NURSING | Facility: CLINIC | Age: 45
End: 2020-10-14

## 2020-10-14 NOTE — TELEPHONE ENCOUNTER
Give an ear full. He had a coronavirus test Thursday. Told MyChart would have results. No results found. Told they were in a different MyChart. He is upset that he has had to wait for results. I charted in his [facility] chart.  Marina Rodriguez RN  [facility]

## 2020-11-29 ENCOUNTER — HEALTH MAINTENANCE LETTER (OUTPATIENT)
Age: 45
End: 2020-11-29

## 2020-12-04 ENCOUNTER — MYC MEDICAL ADVICE (OUTPATIENT)
Dept: FAMILY MEDICINE | Facility: CLINIC | Age: 45
End: 2020-12-04

## 2020-12-04 ENCOUNTER — OFFICE VISIT (OUTPATIENT)
Dept: FAMILY MEDICINE | Facility: CLINIC | Age: 45
End: 2020-12-04
Payer: COMMERCIAL

## 2020-12-04 ENCOUNTER — NURSE TRIAGE (OUTPATIENT)
Dept: NURSING | Facility: CLINIC | Age: 45
End: 2020-12-04

## 2020-12-04 VITALS
HEART RATE: 77 BPM | OXYGEN SATURATION: 95 % | RESPIRATION RATE: 18 BRPM | DIASTOLIC BLOOD PRESSURE: 68 MMHG | TEMPERATURE: 97.9 F | BODY MASS INDEX: 25.29 KG/M2 | HEIGHT: 73 IN | WEIGHT: 190.8 LBS | SYSTOLIC BLOOD PRESSURE: 110 MMHG

## 2020-12-04 DIAGNOSIS — G47.00 INSOMNIA, UNSPECIFIED TYPE: ICD-10-CM

## 2020-12-04 DIAGNOSIS — Z00.00 ROUTINE GENERAL MEDICAL EXAMINATION AT A HEALTH CARE FACILITY: Primary | ICD-10-CM

## 2020-12-04 DIAGNOSIS — Z11.59 SCREENING FOR VIRAL DISEASE: ICD-10-CM

## 2020-12-04 LAB
ALBUMIN SERPL-MCNC: 4.1 G/DL (ref 3.4–5)
ALP SERPL-CCNC: 57 U/L (ref 40–150)
ALT SERPL W P-5'-P-CCNC: 25 U/L (ref 0–70)
ANION GAP SERPL CALCULATED.3IONS-SCNC: 4 MMOL/L (ref 3–14)
AST SERPL W P-5'-P-CCNC: 20 U/L (ref 0–45)
BILIRUB SERPL-MCNC: 1.3 MG/DL (ref 0.2–1.3)
BUN SERPL-MCNC: 17 MG/DL (ref 7–30)
CALCIUM SERPL-MCNC: 8.8 MG/DL (ref 8.5–10.1)
CHLORIDE SERPL-SCNC: 106 MMOL/L (ref 94–109)
CHOLEST SERPL-MCNC: 196 MG/DL
CO2 SERPL-SCNC: 28 MMOL/L (ref 20–32)
CREAT SERPL-MCNC: 0.95 MG/DL (ref 0.66–1.25)
GFR SERPL CREATININE-BSD FRML MDRD: >90 ML/MIN/{1.73_M2}
GLUCOSE SERPL-MCNC: 96 MG/DL (ref 70–99)
HDLC SERPL-MCNC: 50 MG/DL
LDLC SERPL CALC-MCNC: 120 MG/DL
NONHDLC SERPL-MCNC: 144 MG/DL
POTASSIUM SERPL-SCNC: 4.2 MMOL/L (ref 3.4–5.3)
PROT SERPL-MCNC: 7.1 G/DL (ref 6.8–8.8)
SODIUM SERPL-SCNC: 138 MMOL/L (ref 133–144)
TRIGL SERPL-MCNC: 118 MG/DL

## 2020-12-04 PROCEDURE — 99213 OFFICE O/P EST LOW 20 MIN: CPT | Mod: 25 | Performed by: PHYSICIAN ASSISTANT

## 2020-12-04 PROCEDURE — 80061 LIPID PANEL: CPT | Performed by: PHYSICIAN ASSISTANT

## 2020-12-04 PROCEDURE — 36415 COLL VENOUS BLD VENIPUNCTURE: CPT | Performed by: PHYSICIAN ASSISTANT

## 2020-12-04 PROCEDURE — 99396 PREV VISIT EST AGE 40-64: CPT | Performed by: PHYSICIAN ASSISTANT

## 2020-12-04 PROCEDURE — 80053 COMPREHEN METABOLIC PANEL: CPT | Performed by: PHYSICIAN ASSISTANT

## 2020-12-04 RX ORDER — TEMAZEPAM 15 MG/1
15 CAPSULE ORAL
Qty: 30 CAPSULE | Refills: 2 | Status: SHIPPED | OUTPATIENT
Start: 2020-12-04 | End: 2021-06-18

## 2020-12-04 ASSESSMENT — ENCOUNTER SYMPTOMS
SORE THROAT: 0
PARESTHESIAS: 0
HEADACHES: 0
CHILLS: 0
FREQUENCY: 0
DYSURIA: 0
ABDOMINAL PAIN: 0
JOINT SWELLING: 0
EYE PAIN: 0
DIZZINESS: 0
HEMATOCHEZIA: 0
MYALGIAS: 0
CONSTIPATION: 0
DIARRHEA: 0
SHORTNESS OF BREATH: 0
HEARTBURN: 0
ARTHRALGIAS: 0
FEVER: 0
NAUSEA: 0
PALPITATIONS: 0
NERVOUS/ANXIOUS: 0
WEAKNESS: 0
COUGH: 0
HEMATURIA: 0

## 2020-12-04 ASSESSMENT — MIFFLIN-ST. JEOR: SCORE: 1799.34

## 2020-12-04 NOTE — TELEPHONE ENCOUNTER
Pt was seen in the clinic today for a physical, and would like this doctor he saw, (Светлана) to be listed as his PCP if possible.   Pt also called his pharmacy for a new prescription that was suppose to be ordered, and it is not there. Doctor was suppose to call in a prescription for Restoril. Pt would like his prescriptions to go to the [facility] location.     Please call Jaun at [phone] and let him know when prescription will be refilled.     Jag Rendon RN on 12/4/2020 at 2:11 PM

## 2020-12-04 NOTE — PROGRESS NOTES
SUBJECTIVE:   CC: Jaun Lobo is an 45 year old male who presents for preventative health visit.     Patient has been advised of split billing requirements and indicates understanding: Yes     Healthy Habits:     Getting at least 3 servings of Calcium per day:  Yes    Bi-annual eye exam:  Yes    Dental care twice a year:  Yes    Sleep apnea or symptoms of sleep apnea:  None    Diet:  Regular (no restrictions)    Frequency of exercise:  4-5 days/week    Duration of exercise:  30-45 minutes    Taking medications regularly:  Yes    Medication side effects:  None    PHQ-2 Total Score: 0    Additional concerns today:  Yes    Has been a good year for Jaun   this year   Moved to new house   Wife 20 weeks pregnant, due in April, keeping gender a surprise  Working as  mix of work from home and in person     First physical in a long time  Doing yoga -- this helps a lot with back issues   Nightly GERD symptoms have improved since cutting alcohol and cutting back on ice cream at night     Only thing that continues to be issue is insomnia  This runs in family on mothers side   Has taken Temazepam as needed for sleep in the past  Will often go a couple weeks and not use   Has tried lunesta, ambien without relief   Had some insurance issues with 7.5 mg with insurance so has been taking half of a 15 mg tablet        Today's PHQ-2 Score:   PHQ-2 ( 1999 Pfizer) 12/4/2020   Q1: Little interest or pleasure in doing things 0   Q2: Feeling down, depressed or hopeless 0   PHQ-2 Score 0   Q1: Little interest or pleasure in doing things Not at all   Q2: Feeling down, depressed or hopeless Not at all   PHQ-2 Score 0       Abuse: Current or Past(Physical, Sexual or Emotional)- No  Do you feel safe in your environment? Yes        Social History     Tobacco Use     Smoking status: Former Smoker     Smokeless tobacco: Never Used   Substance Use Topics     Alcohol use: No     Alcohol/week: 16.7 standard drinks     Types: 20  Cans of beer per week     Comment: 40 drinks a week , quit drinking 6 weeks ago      If you drink alcohol do you typically have >3 drinks per day or >7 drinks per week? No    Alcohol Use 12/4/2020   Prescreen: >3 drinks/day or >7 drinks/week? No   Prescreen: >3 drinks/day or >7 drinks/week? -   No flowsheet data found.    Last PSA: No results found for: PSA    Reviewed orders with patient. Reviewed health maintenance and updated orders accordingly - Yes      Reviewed and updated as needed this visit by clinical staff                 Reviewed and updated as needed this visit by Provider                    Review of Systems   Constitutional: Negative for chills and fever.   HENT: Negative for congestion, ear pain, hearing loss and sore throat.    Eyes: Negative for pain and visual disturbance.   Respiratory: Negative for cough and shortness of breath.    Cardiovascular: Negative for chest pain, palpitations and peripheral edema.   Gastrointestinal: Negative for abdominal pain, constipation, diarrhea, heartburn, hematochezia and nausea.   Genitourinary: Negative for discharge, dysuria, frequency, genital sores, hematuria, impotence and urgency.   Musculoskeletal: Negative for arthralgias, joint swelling and myalgias.   Skin: Negative for rash.   Neurological: Negative for dizziness, weakness, headaches and paresthesias.   Psychiatric/Behavioral: Negative for mood changes. The patient is not nervous/anxious.      OBJECTIVE:   There were no vitals taken for this visit.    Physical Exam  GENERAL: healthy, alert and no distress  EYES: Eyes grossly normal to inspection, PERRL and conjunctivae and sclerae normal  HENT: ear canals and TM's normal, nose and mouth without ulcers or lesions  NECK: no adenopathy, no asymmetry, masses, or scars and thyroid normal to palpation  RESP: lungs clear to auscultation - no rales, rhonchi or wheezes  CV: regular rate and rhythm, normal S1 S2, no S3 or S4, no murmur, click or rub, no  peripheral edema and peripheral pulses strong  ABDOMEN: soft, nontender, no hepatosplenomegaly, no masses and bowel sounds normal  MS: no gross musculoskeletal defects noted, no edema  SKIN: no suspicious lesions or rashes  NEURO: Normal strength and tone, mentation intact and speech normal  PSYCH: mentation appears normal, affect normal/bright    Diagnostic Test Results:  Labs reviewed in Epic  Results for orders placed or performed in visit on 12/04/20 (from the past 24 hour(s))   Lipid panel reflex to direct LDL Fasting   Result Value Ref Range    Cholesterol 196 <200 mg/dL    Triglycerides 118 <150 mg/dL    HDL Cholesterol 50 >39 mg/dL    LDL Cholesterol Calculated 120 (H) <100 mg/dL    Non HDL Cholesterol 144 (H) <130 mg/dL   Comprehensive metabolic panel (BMP + Alb, Alk Phos, ALT, AST, Total. Bili, TP)   Result Value Ref Range    Sodium 138 133 - 144 mmol/L    Potassium 4.2 3.4 - 5.3 mmol/L    Chloride 106 94 - 109 mmol/L    Carbon Dioxide 28 20 - 32 mmol/L    Anion Gap 4 3 - 14 mmol/L    Glucose 96 70 - 99 mg/dL    Urea Nitrogen 17 7 - 30 mg/dL    Creatinine 0.95 0.66 - 1.25 mg/dL    GFR Estimate >90 >60 mL/min/[1.73_m2]    GFR Estimate If Black >90 >60 mL/min/[1.73_m2]    Calcium 8.8 8.5 - 10.1 mg/dL    Bilirubin Total 1.3 0.2 - 1.3 mg/dL    Albumin 4.1 3.4 - 5.0 g/dL    Protein Total 7.1 6.8 - 8.8 g/dL    Alkaline Phosphatase 57 40 - 150 U/L    ALT 25 0 - 70 U/L    AST 20 0 - 45 U/L     The 10-year ASCVD risk score (Vale YUNIER Jr., et al., 2013) is: 1.7%    Values used to calculate the score:      Age: 45 years      Sex: Male      Is Non- : No      Diabetic: No      Tobacco smoker: No      Systolic Blood Pressure: 110 mmHg      Is BP treated: No      HDL Cholesterol: 50 mg/dL      Total Cholesterol: 196 mg/dL    ASSESSMENT/PLAN:   Jaun was seen today for physical.    Diagnoses and all orders for this visit:    Routine general medical examination at a health care facility  -     Lipid  "panel reflex to direct LDL Fasting  -     Comprehensive metabolic panel (BMP + Alb, Alk Phos, ALT, AST, Total. Bili, TP)    Screening for viral disease  -     COVID-19 Virus (Coronavirus) Antibody & Titer Reflex; Future    Insomnia, unspecified type  -     temazepam (RESTORIL) 15 MG capsule; Take 1 capsule (15 mg) by mouth nightly as needed for sleep      Refill of PRN temazepam sent to pharmacy.  reviewed. No suspicious activity. He will continue to take sparingly.       COUNSELING:   Reviewed preventive health counseling, as reflected in patient instructions       Regular exercise       Healthy diet/nutrition       Family planning    Estimated body mass index is 27.73 kg/m  as calculated from the following:    Height as of 1/16/20: 1.791 m (5' 10.5\").    Weight as of 1/16/20: 88.9 kg (196 lb).     Weight management plan: Discussed healthy diet and exercise guidelines    He reports that he has quit smoking. He has never used smokeless tobacco.      Counseling Resources:  ATP IV Guidelines  Pooled Cohorts Equation Calculator  FRAX Risk Assessment  ICSI Preventive Guidelines  Dietary Guidelines for Americans, 2010  USDA's MyPlate  ASA Prophylaxis  Lung CA Screening    Marcial Sotomayor PA-C  [facility]  "

## 2021-06-12 NOTE — TELEPHONE ENCOUNTER
Coronavirus (COVID-19) Notification    Lab Result   Lab test 2019-nCoV rRt-PCR OR SARS-COV-2 PCR    Nasopharyngeal AND/OR Oropharyngeal swab is NEGATIVE for 2019-nCoV RNA [OR] SARS-COV-2 RNA (COVID-19) RNA    Your result was negative. This means that we didn't find the virus that causes COVID-19 in your sample. A test may show negative when you do actually have the virus. This can happen when the virus is in the early stages of infection, before you feel illness symptoms.    If you have symptoms   Stay home and away from others (self-isolate) until you meet ALL of the guidelines below:    You've had no fever--and no medicine that reduces fever--for 1 full day (24 hours). And      Your other symptoms have gotten better. For example, your cough or breathing has improved. And     At least 10 days have passed since your symptoms started. (If you ve been told by a doctor that you have a weak immune system, wait 20 days.)     During this time:    Stay home. Don't go to work, school or anywhere else.     Stay in your own room, including for meals. Use your own bathroom if you can.    Stay away from others in your home. No hugging, kissing or shaking hands. No visitors.    Clean  high touch  surfaces often (doorknobs, counters, handles, etc.). Use a household cleaning spray or wipes. You can find a full list on the EPA website at www.epa.gov/pesticide-registration/list-n-disinfectants-use-against-sars-cov-2.    Cover your mouth and nose with a mask, tissue or other face covering to avoid spreading germs.    Wash your hands and face often with soap and water.    Going back to work  Check with your employer for any guidelines to follow for going back to work.  You are sent a letter for your Employer which will serve as formal document notice that you, the employee, tested negative for COVID-19, as of the testing date shown above.    If your symptoms worsen or other concerning symptoms, contact PCP, oncare or consider  returning to Emergency Dept.    Where can I get more information?    [facility]: [email or web address]    Coronavirus Basics: [email or web address]/diseases/coronavirus/basics.html    [facility] Hotline (699-920-4019)    {Name]  Marina Rodriguez RN  [facility]

## 2021-06-15 DIAGNOSIS — G47.00 INSOMNIA, UNSPECIFIED TYPE: ICD-10-CM

## 2021-06-17 NOTE — TELEPHONE ENCOUNTER
Last Written Prescription Date:  12/4/20  Last Fill Quantity: 30,   # refills: 2  Last Office Visit: 12/04/20  Future Office visit:       Routing refill request to provider for review/approval because:  Drug not on the [facility], [facility] or [facility] refill protocol or controlled substance

## 2021-06-18 ENCOUNTER — MYC MEDICAL ADVICE (OUTPATIENT)
Dept: FAMILY MEDICINE | Facility: CLINIC | Age: 46
End: 2021-06-18

## 2021-06-18 DIAGNOSIS — G47.00 INSOMNIA, UNSPECIFIED TYPE: ICD-10-CM

## 2021-06-18 RX ORDER — LORAZEPAM 2 MG/ML
CONCENTRATE ORAL
Refills: 0 | OUTPATIENT
Start: 2021-06-18

## 2021-06-18 RX ORDER — TEMAZEPAM 15 MG/1
CAPSULE ORAL
Qty: 30 CAPSULE | Refills: 1 | Status: SHIPPED | OUTPATIENT
Start: 2021-06-18 | End: 2022-02-22

## 2021-06-18 NOTE — TELEPHONE ENCOUNTER
Lorazepam 2mg/ml oral solution:    This has never been prescribed before through [facility] or [place]. Needs an appointment-I sent him a mychart. Jacquelin Rojas RN

## 2021-06-22 RX ORDER — LORAZEPAM 2 MG/ML
CONCENTRATE ORAL
Refills: 0 | OUTPATIENT
Start: 2021-06-22

## 2021-06-22 NOTE — TELEPHONE ENCOUNTER
Lorazepam already denied on another med refill request of 6/18. This is duplicate. ANDRES Roper already wrote pt a [employer] message    Abby Escobar RN, BSN  [facility]

## 2021-07-11 ENCOUNTER — TRANSFERRED RECORDS (OUTPATIENT)
Dept: HEALTH INFORMATION MANAGEMENT | Facility: CLINIC | Age: 46
End: 2021-07-11

## 2021-07-12 ENCOUNTER — OFFICE VISIT (OUTPATIENT)
Dept: FAMILY MEDICINE | Facility: CLINIC | Age: 46
End: 2021-07-12
Payer: COMMERCIAL

## 2021-07-12 VITALS
BODY MASS INDEX: 26.39 KG/M2 | DIASTOLIC BLOOD PRESSURE: 68 MMHG | SYSTOLIC BLOOD PRESSURE: 120 MMHG | WEIGHT: 200 LBS | HEART RATE: 84 BPM | OXYGEN SATURATION: 96 % | TEMPERATURE: 98 F | RESPIRATION RATE: 16 BRPM

## 2021-07-12 DIAGNOSIS — M54.50 ACUTE BILATERAL LOW BACK PAIN WITHOUT SCIATICA: Primary | ICD-10-CM

## 2021-07-12 PROCEDURE — 99214 OFFICE O/P EST MOD 30 MIN: CPT | Mod: 25 | Performed by: FAMILY MEDICINE

## 2021-07-12 PROCEDURE — 96372 THER/PROPH/DIAG INJ SC/IM: CPT | Performed by: FAMILY MEDICINE

## 2021-07-12 RX ORDER — HYDROXYZINE PAMOATE 25 MG/1
25 CAPSULE ORAL 4 TIMES DAILY PRN
Status: ON HOLD | COMMUNITY
Start: 2021-07-11 | End: 2021-07-23

## 2021-07-12 RX ORDER — METHYLPREDNISOLONE 4 MG
TABLET, DOSE PACK ORAL
COMMUNITY
Start: 2021-07-11 | End: 2021-07-21

## 2021-07-12 RX ORDER — KETOROLAC TROMETHAMINE 30 MG/ML
30 INJECTION, SOLUTION INTRAMUSCULAR; INTRAVENOUS ONCE
Status: COMPLETED | OUTPATIENT
Start: 2021-07-12 | End: 2021-07-12

## 2021-07-12 RX ORDER — GABAPENTIN 300 MG/1
300 CAPSULE ORAL AT BEDTIME
Qty: 30 CAPSULE | Refills: 0 | Status: SHIPPED | OUTPATIENT
Start: 2021-07-12 | End: 2021-07-20

## 2021-07-12 RX ADMIN — KETOROLAC TROMETHAMINE 30 MG: 30 INJECTION, SOLUTION INTRAMUSCULAR; INTRAVENOUS at 17:47

## 2021-07-12 ASSESSMENT — PAIN SCALES - GENERAL: PAINLEVEL: MILD PAIN (3)

## 2021-07-12 NOTE — PATIENT INSTRUCTIONS
Avoid aleve, aspirin or advil for next 3 days  Ok to take tylenol 500 to 1, 000 mg every 8 hours as needed for pain  Ok to lidocaine patch, follow package directions  Gabapentin 300 mg at night for next 1-2 weeks, do not take with other sleep aids (temezepam).   Call or message me with update in one to two weeks.

## 2021-07-12 NOTE — PROGRESS NOTES
Assessment & Plan       1. Acute bilateral low back pain with sciatica  - H/o chronic back pain which has improved over the last few years. He was recently evaluated and given medrol dose pack which he hasn't started. He notes that he doesn't tolerate Flexeril/muscle relaxant. He wanted to discuss other options to control pain. One of his family members is on lyrica. We discussed that I dot not narcotics for pain control. We can do OTC tylenol, ibuprofen, lidocaine patches. Due to sciatica, he can do short trial of gabapentin even though that is not recommended but would help with the acute pain and then we can stop the medication. Pt agreeable to plan along with Toradol injection while in clinic to help with acute pain. Advised below -   Avoid aleve, aspirin or advil for next 3 days  Ok to take tylenol 500 to 1, 000 mg every 8 hours as needed for pain  Ok to lidocaine patch, follow package directions  Gabapentin 300 mg at night for next 1-2 weeks, do not take with other sleep aids (temezepam).   Call or message me with update in one to two weeks.   - gabapentin (NEURONTIN) 300 MG capsule; Take 1 capsule (300 mg) by mouth At Bedtime  Dispense: 30 capsule; Refill: 0; advised sedating s/e  - ketorolac (TORADOL) injection 30 mg      Deqa Maria Luz Ny MD  [facility]    Dayanna Zamorano is a 46 year old who presents for the following health issues     HPI     Back Pain  Onset/Duration: 2 weeks   Back pain has gotten worse. Two night he collapsed while trying to get out of bed. He notes that back pain is jonathan intense that it has caused him to not want to stand up or getting out of bed. The pain is aching/burning at rest and worsened with activity or position changes. Today is not as bad as yesterday morning. He has history of back pain and was seen by orthopedic provider and had MRIs. Six to seven years ago he started doing yoga and physical therapy. He notes that with these changes he  was able to manage the pain. Yesterday he was seen at  and got a prescription for medrol dose pack and he previously used to take the medication with the back pain. He is hesitant to take the medrol dose pack and he has done a lot of reading and read that prednisone can inhibit long term weaken disk. He wants to try other medication rather than steroid. He has history of sobriety from alcohol. Yesterday he declined flexeril as medication makes him crabby and feels dull.   Description:   Location of pain: low back bilateral  Character of pain: sharp, dull ache, stabbing, burning, cramping, constant and waxing and waning  Pain radiation: radiates into the left buttocks and radiates into the left leg  New numbness or weakness in legs, not attributed to pain: no   Intensity: Currently 3/10, At its worst 10/10  Progression of Symptoms: same and constant  History:   Specific cause: unknown  Pain interferes with job: YES  History of back problems: previous degenerative joint disease of the lumbar spine and previous herniated disc  Any previous MRI or X-rays: Yes- at [place].  Date Multiple occasions  Sees a specialist for back pain: No  Alleviating factors:   Improved by: Physical Therapy and stretch    Precipitating factors:  Worsened by: Lifting, Bending and Lying Flat  Therapies tried and outcome: Physical Therapy and stretch    Accompanying Signs & Symptoms:  Risk of Fracture: None  Risk of Cauda Equina: None  Risk of Infection: None  Risk of Cancer: None  Risk of Ankylosing Spondylitis: Onset at age <35, male, AND morning back stiffness  no         Review of Systems         Objective    There were no vitals taken for this visit.  There is no height or weight on file to calculate BMI.  Physical Exam   GENERAL: healthy, alert and + distress  BACK: + paralumbar tenderness, limited ROM due to pain

## 2021-07-12 NOTE — NURSING NOTE
Clinic Administered Medication Documentation    Administrations This Visit     ketorolac (TORADOL) injection 30 mg     Admin Date  07/12/2021 Action  Given Dose  30 mg Route  Intramuscular Site   Administered By  Niharika Barry    Ordering Provider: Harleen Ny MD    NDC: 75841-830-73    Lot#: [id number]    : [facility]    Patient Supplied?: No                  Injectable Medication Documentation    Patient was given Ketorolac Tromethamine (Toradol). Prior to medication administration, verified patients identity using patient s name and date of birth. Please see MAR and medication order for additional information. Patient instructed to remain in clinic for 15 minutes.      Was entire vial of medication used? Yes  Vial/Syringe: Single dose vial  Expiration Date:  09/01/2022  Was this medication supplied by the patient? No     Niharika Barry CMA

## 2021-07-14 ENCOUNTER — MYC MEDICAL ADVICE (OUTPATIENT)
Dept: FAMILY MEDICINE | Facility: CLINIC | Age: 46
End: 2021-07-14

## 2021-07-14 NOTE — TELEPHONE ENCOUNTER
Info from [facility] Sotomayor PA-C given to pt and he verbalized his understanding. He inquired about the pain clinic, as that is where he ended up 8 years ago, pt advised by writer that ortho needs to work this up first, and then he can follow up if they come up with nothing.   Pt also wondering if he can take any nsaids yet seeing that he got toradol on Monday(that has worn off)? He has not taken the prednisone dose mari due to the aggressive behavior s/e's.    He is also wondering about increasing his gabapentin dose as that isn't feeling fully effective either? He hadn't taken any tylenol yet, so we did discuss getting that on board, as well as the max daily dose of 3000mg. We also discussed him adding in alternating ice/heat 20min throughout the day.   Pt will call to schedule with walk in ortho- and then I will get back to him about the robbin and nsaids.    Thanks!     Teresa Watts RN

## 2021-07-14 NOTE — TELEPHONE ENCOUNTER
Thanks for the update.  OK for him to start NSAIDs. Would start staggering NSAID and APAP.   He can increase the gabapentin to 600 mg in evenings.   Good idea with ice/heat.   Appreciate your help!  [facility]

## 2021-07-14 NOTE — TELEPHONE ENCOUNTER
Per [facility] German Triage,   Can you please call Jaun and direct him toward the [facility] ortho walk in clinic? They ask that patients call ahead [phone] to reserve time slot -- he should be able to get in this week. Given his back history (and I did not assess him most recently) I would advise this for follow up. If gabapentin not helping he can stop that. Should try staggering tylenol/ibuprofen throughout the day if not doing already. Otherwise the steroid might be a good option but would encourage him to monitor his mood given anger history. Thank you!

## 2021-07-14 NOTE — TELEPHONE ENCOUNTER
Writer called patient and reviewed message per MARTINA Sotomayor PA-C.    Patient verbalized understanding and in agreement with plan.    Patient stated Orthopedic Walk-In Clinic could not see him today but advised him to call tomorrow AM at 0700 to receive a time to be evaluated.    NEELAM DonovanN, RN  [facility]

## 2021-07-15 ENCOUNTER — ANCILLARY PROCEDURE (OUTPATIENT)
Dept: MRI IMAGING | Facility: CLINIC | Age: 46
End: 2021-07-15
Attending: FAMILY MEDICINE
Payer: COMMERCIAL

## 2021-07-15 ENCOUNTER — OFFICE VISIT (OUTPATIENT)
Dept: ORTHOPEDICS | Facility: CLINIC | Age: 46
End: 2021-07-15
Payer: COMMERCIAL

## 2021-07-15 VITALS — HEIGHT: 73 IN | BODY MASS INDEX: 26.51 KG/M2 | WEIGHT: 200 LBS

## 2021-07-15 DIAGNOSIS — M54.50 CHRONIC LEFT-SIDED LOW BACK PAIN WITHOUT SCIATICA: ICD-10-CM

## 2021-07-15 DIAGNOSIS — G89.29 CHRONIC LEFT-SIDED LOW BACK PAIN WITHOUT SCIATICA: ICD-10-CM

## 2021-07-15 DIAGNOSIS — G89.29 CHRONIC LEFT-SIDED LOW BACK PAIN WITHOUT SCIATICA: Primary | ICD-10-CM

## 2021-07-15 DIAGNOSIS — M54.50 CHRONIC LEFT-SIDED LOW BACK PAIN WITHOUT SCIATICA: Primary | ICD-10-CM

## 2021-07-15 PROCEDURE — 99204 OFFICE O/P NEW MOD 45 MIN: CPT | Performed by: FAMILY MEDICINE

## 2021-07-15 PROCEDURE — 72148 MRI LUMBAR SPINE W/O DYE: CPT | Mod: GC | Performed by: RADIOLOGY

## 2021-07-15 ASSESSMENT — MIFFLIN-ST. JEOR: SCORE: 1836.07

## 2021-07-15 NOTE — PROGRESS NOTES
[facility]  SPORTS & ORTHOPEDIC CLINIC VISIT     Jul 15, 2021        ASSESSMENT & PLAN    46-year-old with acute on chronic left-sided low back pain with some radiation into the hip and groin that could be radicular.    Reviewed imaging and assessment with patient in detail  Plan to take Medrol dose pack in the morning with food for the next 6 days  He'll continue to use gabapentin 600 mg nightly.  If he runs out he will contact us and let us know what regimen he is currently taking and we will gladly refill this medication if he finds it to be helpful.  Additionally we will pursue an MRI of his lumbar spine given his prior history of chronic low back pain and a disc abnormalities.    Mike Ferreira MD  [facility]IC[facility] [place]    -----  Chief Complaint   Patient presents with     Lower Back - Pain       SUBJECTIVE  Jaun Lobo is a/an 46 year old male who is seen as a self referral for evaluation of  Low back pain.     The patient is seen by themselves.  The patient is Right handed    Onset: 3 week(s) ago. Patient describes injury as four wheeling, sitting on a plane, and skipped his PT exercises.  Location of Pain: bilateral low back wrapping around the hips and jerry on left  Worsened by: walking, rolling over, standing up and sitting down, rotating   Better with: Rest, lying on back  Treatments tried: other medications: Gabapentin  Associated symptoms: no distal numbness or tingling; denies swelling or warmth    [facility] and by [facility]CP. [facility] performed at [facility] Prescribed medrol dosepack and hydroxyzine. Did not yet take medrol dose pack. Because of concern for side effect. PCP rx'ed gabapentin. Has not noted a difference yet.     Orthopedic/Surgical history: YES - Date: 2013 Spinal stenosis, disc herniations. Treated with PT and yoga. Has done well until recent flare up.   Social History/Occupation:        REVIEW OF SYSTEMS:    Do you have  "fever, chills, weight loss? No    Do you have any vision problems? No    Do you have any chest pain or edema? No    Do you have any shortness of breath or wheezing?  No    Do you have stomach problems? No    Do you have any numbness or focal weakness? Yes, has issues with weakness getting up stairs    Do you have diabetes? No    Do you have problems with bleeding or clotting? No    Do you have an rashes or other skin lesions? No    OBJECTIVE:  Ht 1.854 m (6' 1\")   Wt 90.7 kg (200 lb)   BMI 26.39 kg/m       General: alert, pleasant, no distress. sitting comfortably in chair  Back/Spine: no tenderness to palpation of spinous processes, or paraspinous musculature of lumbar spine. Very limited ROM due to pain. Slump test negative bilaterally.   Neuro: SILT on bilateral lower extremities, can stand on toes and heel walk without difficulty, patellar and achilles reflexes 2+ and symmetric,      RADIOLOGY:    Reviewed outside imaging of the lumbar spine from 7/11/2021.  2 views demonstrates multilevel DJD worst at L5-S1.  Lower lumbar facet arthropathy is also present.  Images available in PACS.             "

## 2021-07-15 NOTE — LETTER
7/15/2021      RE: Jaun Lobo  [address]  [place] [address]         [facility]  SPORTS & ORTHOPEDIC CLINIC VISIT     Jul 15, 2021        ASSESSMENT & PLAN    46-year-old with acute on chronic left-sided low back pain with some radiation into the hip and groin that could be radicular.    Reviewed imaging and assessment with patient in detail  Plan to take Medrol dose pack in the morning with food for the next 6 days  He'll continue to use gabapentin 600 mg nightly.  If he runs out he will contact us and let us know what regimen he is currently taking and we will gladly refill this medication if he finds it to be helpful.  Additionally we will pursue an MRI of his lumbar spine given his prior history of chronic low back pain and a disc abnormalities.    Mike Ferreira MD  [facility] [place]    -----  Chief Complaint   Patient presents with     Lower Back - Pain       SUBJECTIVE  Jaun Lobo is a/an 46 year old male who is seen as a self referral for evaluation of  Low back pain.     The patient is seen by themselves.  The patient is Right handed    Onset: 3 week(s) ago. Patient describes injury as four wheeling, sitting on a plane, and skipped his PT exercises.  Location of Pain: bilateral low back wrapping around the hips and jerry on left  Worsened by: walking, rolling over, standing up and sitting down, rotating   Better with: Rest, lying on back  Treatments tried: other medications: Gabapentin  Associated symptoms: no distal numbness or tingling; denies swelling or warmth    Seen at [facility] and by PCP. Xrays performed at [facility]. Prescribed medrol dosepack and hydroxyzine. Did not yet take medrol dose pack. Because of concern for side effect. PCP rx'ed gabapentin. Has not noted a difference yet.     Orthopedic/Surgical history: YES - Date: 2013 Spinal stenosis, disc herniations. Treated with PT and yoga. Has done well until recent flare up.  "  Social History/Occupation:        REVIEW OF SYSTEMS:    Do you have fever, chills, weight loss? No    Do you have any vision problems? No    Do you have any chest pain or edema? No    Do you have any shortness of breath or wheezing?  No    Do you have stomach problems? No    Do you have any numbness or focal weakness? Yes, has issues with weakness getting up stairs    Do you have diabetes? No    Do you have problems with bleeding or clotting? No    Do you have an rashes or other skin lesions? No    OBJECTIVE:  Ht 1.854 m (6' 1\")   Wt 90.7 kg (200 lb)   BMI 26.39 kg/m       General: alert, pleasant, no distress. sitting comfortably in chair  Back/Spine: no tenderness to palpation of spinous processes, or paraspinous musculature of lumbar spine. Very limited ROM due to pain. Slump test negative bilaterally.   Neuro: SILT on bilateral lower extremities, can stand on toes and heel walk without difficulty, patellar and achilles reflexes 2+ and symmetric,      RADIOLOGY:    Reviewed outside imaging of the lumbar spine from 7/11/2021.  2 views demonstrates multilevel DJD worst at L5-S1.  Lower lumbar facet arthropathy is also present.  Images available in PACS      Mike Ferreira MD    "

## 2021-07-16 ENCOUNTER — TELEPHONE (OUTPATIENT)
Dept: ORTHOPEDICS | Facility: CLINIC | Age: 46
End: 2021-07-16

## 2021-07-16 DIAGNOSIS — M54.50 ACUTE BILATERAL LOW BACK PAIN WITHOUT SCIATICA: Primary | ICD-10-CM

## 2021-07-16 NOTE — TELEPHONE ENCOUNTER
[facility] Call Center    Phone Message    May a detailed message be left on voicemail: yes     Reason for Call: Other: Patient has taken 2 doses of the Medrol mari and doubled the Gabapenten. He states he is still in Extreme pain and wondering what else he could do? if needed pharmacy [facility]      Action Taken: Message routed to:  [facility] ([facility]): Ortho    Travel Screening: Not Applicable

## 2021-07-17 ENCOUNTER — MYC MEDICAL ADVICE (OUTPATIENT)
Dept: ORTHOPEDICS | Facility: CLINIC | Age: 46
End: 2021-07-17

## 2021-07-17 RX ORDER — TRAMADOL HYDROCHLORIDE 50 MG/1
50 TABLET ORAL EVERY 6 HOURS PRN
Qty: 10 TABLET | Refills: 0 | Status: SHIPPED | OUTPATIENT
Start: 2021-07-17 | End: 2021-07-21

## 2021-07-17 NOTE — TELEPHONE ENCOUNTER
"Pt appreciated the call back on the weekend to help manage his persistent low back pain.  Pt is concerned because he has had relief quickly in the past with medrol, and he is not noticing any benefit from it currently which is causing concern.  I let him know it is not out of the ordinary for flare ups like this to change and might need different forms of treatment.  Dr. Nicolas felt comfortable prescribing a short supply of tramadol for the Pt over the weekend due to his significant low back pain.  Pt was also concerned because his prior yoga, and gentle HEP used to help, but it is painful and ineffective for this episode.  I let him know it might be appropriate for him to see our \"acute care\" low back pain PT route which he was agreeable to.  Dr. Nicolas mentioned that would be an appropriate option for him to try as well if it can provide some relief.    He thanked me for the help and looks forward to discussing the MRI with Dr. Ferreira on Friday.     - Isma Alfonso ATC    "

## 2021-07-18 ENCOUNTER — HOSPITAL ENCOUNTER (EMERGENCY)
Facility: CLINIC | Age: 46
Discharge: HOME | End: 2021-07-18
Attending: EMERGENCY MEDICINE | Admitting: EMERGENCY MEDICINE
Payer: COMMERCIAL

## 2021-07-18 ENCOUNTER — MYC MEDICAL ADVICE (OUTPATIENT)
Dept: FAMILY MEDICINE | Facility: CLINIC | Age: 46
End: 2021-07-18

## 2021-07-18 ENCOUNTER — NURSE TRIAGE (OUTPATIENT)
Dept: NURSING | Facility: CLINIC | Age: 46
End: 2021-07-18

## 2021-07-18 VITALS
HEART RATE: 74 BPM | RESPIRATION RATE: 16 BRPM | DIASTOLIC BLOOD PRESSURE: 84 MMHG | TEMPERATURE: 97.6 F | OXYGEN SATURATION: 97 % | SYSTOLIC BLOOD PRESSURE: 139 MMHG

## 2021-07-18 DIAGNOSIS — M54.50 ACUTE BILATERAL LOW BACK PAIN WITHOUT SCIATICA: ICD-10-CM

## 2021-07-18 DIAGNOSIS — M51.369 DDD (DEGENERATIVE DISC DISEASE), LUMBAR: ICD-10-CM

## 2021-07-18 PROCEDURE — 96372 THER/PROPH/DIAG INJ SC/IM: CPT | Performed by: EMERGENCY MEDICINE

## 2021-07-18 PROCEDURE — 250N000011 HC RX IP 250 OP 636: Performed by: EMERGENCY MEDICINE

## 2021-07-18 PROCEDURE — 99284 EMERGENCY DEPT VISIT MOD MDM: CPT | Performed by: EMERGENCY MEDICINE

## 2021-07-18 RX ORDER — OXYCODONE HYDROCHLORIDE 5 MG/1
5 TABLET ORAL EVERY 6 HOURS PRN
Qty: 12 TABLET | Refills: 0 | Status: ON HOLD | OUTPATIENT
Start: 2021-07-18 | End: 2021-07-22

## 2021-07-18 RX ORDER — LIDOCAINE 4 G/G
1 PATCH TOPICAL EVERY 24 HOURS
Qty: 12 PATCH | Refills: 0 | Status: SHIPPED | OUTPATIENT
Start: 2021-07-18 | End: 2021-07-21

## 2021-07-18 RX ADMIN — HYDROMORPHONE HYDROCHLORIDE 1 MG: 1 INJECTION, SOLUTION INTRAMUSCULAR; INTRAVENOUS; SUBCUTANEOUS at 08:43

## 2021-07-18 RX ADMIN — HYDROMORPHONE HYDROCHLORIDE 1 MG: 1 INJECTION, SOLUTION INTRAMUSCULAR; INTRAVENOUS; SUBCUTANEOUS at 06:48

## 2021-07-18 NOTE — DISCHARGE INSTRUCTIONS
Instructions from your doctor today:  Emergency Department testing is focused on the potential causes of your symptoms that are the most dangerous possibilities, and cannot cover every possibility. Based on the evaluation, it was deemed sufficiently safe to discharge and continue management through the clinics. Thus, follow-up is very important to assess for improvement/worsening, potential further testing, and potential treatment adjustments. If you were given opioid pain medications or other medications that can make you drowsy while in the ED, you should not drive for at least several hours and not until you feel completely back to normal.     Please follow up with:  - Your Primary Care Provider in 2-3 days by phone   - your orthopedic appointment on Friday as already scheduled  - If you do not have a primary care provider, you can be seen in follow-up and establish care by calling any of the clinics below:     - Primary Care Center (phone: [phone])     - Primary Care / [facility] (phone: [phone])   - Have your clinic provider review the results from today's visit with you again, including any potential follow-up or additional testing that may be needed based on the results. Occasionally, incidental findings are found on later review by radiologists that may need follow-up.     Return to the Emergency Department immediately if you have leg weakness, loss of bowel or bladder control, groin numbness, worsening symptoms, or any other urgent or potentially life-threatening concerns.

## 2021-07-18 NOTE — ED PROVIDER NOTES
History     Chief Complaint   Patient presents with     Back Pain     HPI  Jaun Lobo is a 46 year old male with PMH notable for degenerative disc disease who presents to the ED with back pain. Symptoms started shortly before arrival. Patient reports that upon standing from the toilet, he felt severe pain in the lower back, just left of midline. Pain does not radiate, sharp quality, constant, worse with movement. No weakness, no numbness. No dysuria, no urgency, no frequency. No incontinence, no saddle anesthesia. No history of malignancy or IV drug use. Patient had an episode of diffuse muscle spasms that was brief after onset of the pain, while he was laying down.     Past Medical History  Past Medical History:   Diagnosis Date     Alcohol dependence (H)      Allergic rhinitis, cause unspecified     allergy shots as kid     Anxiety state, unspecified     on [facility]     Back disorder     degenerative      DDD (degenerative disc disease)      Esophagitis      Lichen planus   2007     MEDICAL HISTORY OF - 3/10/10    ulcerative esophagitis     Mild intermittent asthma     rare use of albuteral     Tobacco use disorder     quit 2002     Unspecified hemorrhoids without mention of complication      Past Surgical History:   Procedure Laterality Date     CL AFF SURGICAL PATHOLOGY       ESOPHAGOSCOPY, GASTROSCOPY, DUODENOSCOPY (EGD), COMBINED  5/9/2014    Procedure: COMBINED ESOPHAGOSCOPY, GASTROSCOPY, DUODENOSCOPY (EGD), BIOPSY SINGLE OR MULTIPLE;  Surgeon: Yanely Macias MD;  Location: [facility]     wisdom teeth       [facility] NONSPECIFIC PROCEDURE      nasal fx; left clavic fx     [facility] NONSPECIFIC PROCEDURE  12/04    normal colonoscopy; rpt age 49     Lidocaine (LIDOCARE) 4 % Patch  oxyCODONE (ROXICODONE) 5 MG tablet  albuterol (PROAIR HFA, PROVENTIL HFA, VENTOLIN HFA) 108 (90 BASE) MCG/ACT inhaler  gabapentin (NEURONTIN) 300 MG capsule  hydrOXYzine (VISTARIL) 25 MG capsule  melatonin 3 MG  tablet  methylPREDNISolone (MEDROL DOSEPAK) 4 MG tablet therapy pack  temazepam (RESTORIL) 15 MG capsule  traMADol (ULTRAM) 50 MG tablet      Allergies   Allergen Reactions     Antabuse [Disulfiram] Other (See Comments)     hepatitis     Campral [Acamprosate] Other (See Comments)     Mood changes     Animal Dander      Dust Mite Extract      No Known Drug Allergies      Ragweeds      goldenrod     Seasonal Allergies      Social History   Social History     Tobacco Use     Smoking status: Former Smoker     Smokeless tobacco: Never Used   Substance Use Topics     Alcohol use: No     Alcohol/week: 16.7 standard drinks     Types: 20 Cans of beer per week     Comment: 40 drinks a week , quit drinking 6 weeks ago      Drug use: No      Past medical history and social history were reviewed with the patient. Additional pertinent items: None     Review of Systems  A complete review of systems was performed with pertinent positives and negatives noted in the HPI, and all other systems negative.     Physical Exam   BP: 131/82  Pulse: 82  Temp: 97.6  F (36.4  C)  Resp: 16  SpO2: 100 %    Physical Exam  General: no acute distress. Appears stated age.   HENT: MMM, no oropharyngeal lesions  Eyes: PERRL, normal sclerae  Neck: non-tender, supple  Cardio: regular rate. Regular rhythm. Extremities well perfused  Resp: Normal work of breathing, clear breath sounds  Chest/Back: no visual signs of trauma, no CVA tenderness. Mild midline tenderness. Mild paraspinal tenderness. Severe pain reproduced with active back extension.   Abdomen: no tenderness, non-distended, no rebound, no guarding  Neuro: alert and fully oriented. CN II-XII grossly intact. Grossly normal strength and sensation in upper extremities. Strength left: 5/5 hip flexion, 5/5 knee flexion, 5/5 knee extension, 5/5 ankle plantarflexion, 5/5 ankle dorsiflexion. Strength right: 5/5 hip flexion, 5/5 knee flexion, 5/5 knee extension, 5/5 ankle plantarflexion, 5/5 ankle  dorsiflexion. Sensation intact to soft touch throughout lower extremities. 2+ Achilles and patellar reflexes. Normal tone, no clonus. Normal standard and inline gait.    MSK: no deformities. Straight leg raise negative left, negative right.   Integumentary/Skin: no rash visualized, normal color  Psych: normal affect, normal behavior    ED Course      Procedures       Critical Care time:  none       Labs Ordered and Resulted from Time of ED Arrival Up to the Time of Departure from the ED - No data to display  No orders to display          Assessments & Plan (with Medical Decision Making)   Patient presenting with back pain. Vitals in the ED unremarkable. Nursing notes reviewed. Initial differential diagnosis includes but not limited to muscle spasm, radiculopathy, sciatica, fracture.     No urinary symptoms to suggest UTI/pyelonephritis. No history of malignancy to suggest spinal bony lesions. No history of IV drug use nor fever to suggest epidural abscess. No saddle anesthesia nor incontinence to suggest cauda equina syndrome.     In the ED, the patient's symptoms were managed with IM hydromorphone with improvement in symptoms upon reassessment.     After counseling on the diagnosis, work-up, and treatment plan, the patient was discharged to home. Lidocaine patches and short course of oxycodone prescribed. The patient was advised to follow-up with primary care in a few days if pain continues. The patient was advised to return to the ED if worsening symptoms, weakness, incontinence, fever, or if there are any urgent/life-threatening concerns.     Final diagnoses:   Acute bilateral low back pain without sciatica   DDD (degenerative disc disease), lumbar     Discharge Medication List as of 7/18/2021  8:37 AM          --  Remigio Kothari MD   Emergency Medicine   [facility] EMERGENCY DEPARTMENT  7/18/2021       Remigio Kothari MD  07/19/21 0805

## 2021-07-18 NOTE — TELEPHONE ENCOUNTER
"Jaun calls to report that he has \"full body and back spasms.\"    Unable to move and walk. \"I feel like I am convulsing.\"    Took Tramadol at 9:30 PM.    He thinks it might be medication side effects or is related to his current back issues.    Pt started to sound confused towards the end of the call.    Per protocol, advised to call 911. Care advice reviewed. Wife verbalizes understanding, she took over the call and said she will call 911.    Emi Stokes RN/[facility] Nurse Advisor      Reason for Disposition    Shock suspected (e.g., cold/pale/clammy skin, too weak to stand, low BP, rapid pulse)    Additional Information    Negative: Passed out (i.e., lost consciousness, collapsed and was not responding)    Protocols used: BACK PAIN-A-AH      "

## 2021-07-19 ENCOUNTER — TELEPHONE (OUTPATIENT)
Dept: FAMILY MEDICINE | Facility: CLINIC | Age: 46
End: 2021-07-19

## 2021-07-19 ENCOUNTER — THERAPY VISIT (OUTPATIENT)
Dept: PHYSICAL THERAPY | Facility: CLINIC | Age: 46
End: 2021-07-19
Attending: FAMILY MEDICINE
Payer: COMMERCIAL

## 2021-07-19 DIAGNOSIS — M54.50 ACUTE BILATERAL LOW BACK PAIN WITHOUT SCIATICA: ICD-10-CM

## 2021-07-19 PROCEDURE — 97530 THERAPEUTIC ACTIVITIES: CPT | Mod: GP | Performed by: PHYSICAL THERAPIST

## 2021-07-19 PROCEDURE — 97161 PT EVAL LOW COMPLEX 20 MIN: CPT | Mod: GP | Performed by: PHYSICAL THERAPIST

## 2021-07-19 NOTE — TELEPHONE ENCOUNTER
Called and spoke with patient, patient was thankful for the call and all the effort from the team to help with his pain.     Patient is having a phone call with  tomorrow.

## 2021-07-19 NOTE — PROGRESS NOTES
Physical Therapy Initial Evaluation  Subjective:  The history is provided by the patient. No  was used.   Therapist Generated HPI Evaluation  Problem details: June 2021. Pt reports chronic history of LBP. He had PT and started performing Yoga in 2013 which helped him feel better. He started having worsening LBP 3 weeks ago while on vacation. He took flight and 4-gonzalez ride during his vacation. His LBP worsened even more upon returning from vacation. He was seen in ER yesterday d/t pain while attempting to have bowel movement at home..         Type of problem:  Lumbar.    This is a chronic condition.  Condition occurred with:  Insidious onset.  Where condition occurred: for unknown reasons.  Patient reports pain:  Lumbar spine left and lumbar spine right.  Pain is described as other and aching (severe, intense) and is constant.  Pain radiates to:  Gluteals right and gluteals left (B groin and lower abdominal). Pain is worse during the night.  Since onset symptoms are rapidly worsening.  Associated symptoms:  Loss of motion/stiffness and loss of strength (low back weakness). Symptoms are exacerbated by bending, sitting, other, lifting, standing, walking and carrying (transfers)  and relieved by other (moving cautiously).  Special tests included:  MRI.    Restrictions due to condition include:  Working in normal job without restrictions.  Barriers include:  None as reported by patient.    Patient Health History  Jaun Lobo being seen for back trauma.          Pain is reported as 7/10 on pain scale.  General health as reported by patient is excellent.  Pertinent medical history includes: none.   Red flags:  Pain at rest/night (consistent with current low back problem).  Medical allergies: none.   Surgeries include:  None.    Current medications:  Pain medication, sleep medication and steroids.    Current occupation is /desk.   Primary job tasks include:  Computer work, lifting/carrying  and prolonged sitting.                                    Objective:  Standing Alignment:    Cervical/Thoracic:  Forward head  Shoulder/UE:  Rounded shoulders  Lumbar:  Normal                           Lumbar/SI Evaluation  ROM:  Arom wnl lumbar: AROM limited in all planes d/t apprehension to movement.                                                                       Bobo Lumbar Evaluation    Posture:  Sitting: fair  Standing: fair  Lordosis: WNL  Lateral Shift: nil  Correction of Posture: better        Static Tests:          Lying Prone in Extension: prone lying: decrease, better    Conclusion: derangement  Principle of Treatment:  Posture Correction: posture correction    Extension: prone lying                                           ROS    Assessment/Plan:    Patient is a 46 year old male with lumbar complaints.    Patient has the following significant findings with corresponding treatment plan.                Diagnosis 1:  Back pain  Pain -  hot/cold therapy, manual therapy, self management, education, directional preference exercise and home program  Decreased ROM/flexibility - manual therapy, therapeutic exercise and home program  Decreased proprioception - neuro re-education, therapeutic activities and home program  Impaired muscle performance - neuro re-education and home program  Decreased function - therapeutic activities and home program  Impaired posture - neuro re-education and home program    Therapy Evaluation Codes:   1) History comprised of:   Personal factors that impact the plan of care:      Time since onset of symptoms.    Comorbidity factors that impact the plan of care are:      Pain at night/rest.     Medications impacting care: Pain, Steroids and Sleep.  2) Examination of Body Systems comprised of:   Body structures and functions that impact the plan of care:      Lumbar spine.   Activity limitations that impact the plan of care are:      Bathing, Bending, Cooking, Driving,  Dressing, Lifting, Reading/Computer work, Sitting, Squatting/kneeling, Standing, Walking, Working and Sleeping.  3) Clinical presentation characteristics are:   Stable/Uncomplicated.  4) Decision-Making    Low complexity using standardized patient assessment instrument and/or measureable assessment of functional outcome.  Cumulative Therapy Evaluation is: Low complexity.    Previous and current functional limitations:  (See Goal Flow Sheet for this information)    Short term and Long term goals: (See Goal Flow Sheet for this information)     Communication ability:  Patient appears to be able to clearly communicate and understand verbal and written communication and follow directions correctly.  Treatment Explanation - The following has been discussed with the patient:   RX ordered/plan of care  Anticipated outcomes  Possible risks and side effects  This patient would benefit from PT intervention to resume normal activities.   Rehab potential is good.    Frequency:  1 X week, once daily  Duration:  for 6 weeks tapering to 1 X every other week over 4 weeks  Discharge Plan:  Achieve all LTG.  Independent in home treatment program.  Reach maximal therapeutic benefit.    Please refer to the daily flowsheet for treatment today, total treatment time and time spent performing 1:1 timed codes.

## 2021-07-19 NOTE — TELEPHONE ENCOUNTER
ED / Discharge Outreach Protocol    Patient Contact    Attempt # 1    Was call answered?  No.  Left message on voicemail with information to call me back.    Shannan De Los Santos RN

## 2021-07-20 ENCOUNTER — VIRTUAL VISIT (OUTPATIENT)
Dept: ORTHOPEDICS | Facility: CLINIC | Age: 46
End: 2021-07-20
Payer: COMMERCIAL

## 2021-07-20 DIAGNOSIS — M54.50 ACUTE BILATERAL LOW BACK PAIN WITHOUT SCIATICA: ICD-10-CM

## 2021-07-20 DIAGNOSIS — G89.29 CHRONIC LEFT-SIDED LOW BACK PAIN WITHOUT SCIATICA: Primary | ICD-10-CM

## 2021-07-20 DIAGNOSIS — M54.50 CHRONIC LEFT-SIDED LOW BACK PAIN WITHOUT SCIATICA: Primary | ICD-10-CM

## 2021-07-20 PROCEDURE — 99214 OFFICE O/P EST MOD 30 MIN: CPT | Mod: TEL | Performed by: FAMILY MEDICINE

## 2021-07-20 RX ORDER — GABAPENTIN 300 MG/1
300 CAPSULE ORAL 3 TIMES DAILY
Qty: 90 CAPSULE | Refills: 1 | Status: SHIPPED | OUTPATIENT
Start: 2021-07-20 | End: 2022-02-22

## 2021-07-20 NOTE — LETTER
7/20/2021       RE: Jaun Lobo  [address]  [place] [address]     Dear Colleague,    Thank you for referring your patient, Jaun Lobo, to the [facility] SPORTS MEDICINE CLINIC [place] [facility]. Please see a copy of my visit note below.      [facility]  SPORTS & ORTHOPEDIC CLINIC VISIT     Jul 20, 2021        ASSESSMENT & PLAN    46-year-old with acute episode of lumbar spine pain that is likely secondary to acute disc herniation into L3.  Has had severe poorly controlled pain.     Reviewed imaging and assessment with patient in detail.  Also reviewed with Dr. Venancio Burleson of spine surgery.    Complete Medrol Dosepak today.  Gabapentin increase to TID   Given prescription for corset type back brace to be used as needed for comfort  Decrease oxycodone. Can refill if runs out.   Lidocaine patch   Hydroxyzine at bedtime   Ibuprofen PRN     Okay to continue PT to find positions of comfort however will likely need to change regimen given the findings on MRI.  Rest should be the rule for the next 6 weeks.  Plan to reengage physical therapy more rigorously at that time.    Mike Ferreira MD  [facility] SPORTS MEDICINE [facility]    Jaun is a 46 year old who is being evaluated via a billable telephone visit.      What phone number would you like to be contacted at? [phone]  How would you like to obtain your AVS? [facility]  Phone call duration: 30 minutes    -----  Chief Complaint   Patient presents with     Follow Up     MRI results        SUBJECTIVE  Jaun Lobo is a/an 46 year old male who is seen for follow up of acute on chronic low back pain.  Since our last visit he has had a visit to the ER due to an abrupt increase in low back pain.  Patient reports severe pain when standing off the toilet and an episode of his legs giving out.  This was followed by intense spasm and a sensation that he could  not move.  He was then seen in physical therapy.  There are some positions that seem to be helpful however as he transitions out of those positions he experiences severe episodes of pain.  This occurred as recently as this morning.  Pain is generally limited to the lumbar area with no radiation down the legs.  However with severe pain flares he does have some feelings of weakness and instability in the lower extremities.  No tingling or numbness.  Underwent MRI on 7/15/2021 and is eager to hear results.        REVIEW OF SYSTEMS:    See HPI     OBJECTIVE:  There were no vitals taken for this visit.     Telephone visit.  No exam.    RADIOLOGY:    MRI Lumbar spine 7/15/2021. Per Radiology:     Impression:  1. Resolution of large right central L5-S1 disc extrusion since 2013.  No high-grade spinal canal or neural foraminal narrowing. Mild  progression in mild to moderate neural foraminal narrowing in the  lower lumbar spine.  2. Progression of Schmorl's node deformity in the upper endplate of L3  since 2013, with surrounding marrow edema suggesting that this is  recent. Suspect additional recent posterior inferior endplate  fracture/developing Schmorl's node deformity at L2.      Again, thank you for allowing me to participate in the care of your patient.      Sincerely,    Mike Ferreira MD

## 2021-07-20 NOTE — PROGRESS NOTES
[facility] CLINICS AND SURGERY CENTER  SPORTS & ORTHOPEDIC CLINIC VISIT     Jul 20, 2021        ASSESSMENT & PLAN    46-year-old with acute episode of lumbar spine pain that is likely secondary to acute disc herniation into L3.  Has had severe poorly controlled pain.     Reviewed imaging and assessment with patient in detail.  Also reviewed with Dr. Venancio Burleson of spine surgery.    Complete Medrol Dosepak today.  Gabapentin increase to TID   Given prescription for corset type back brace to be used as needed for comfort  Decrease oxycodone. Can refill if runs out.   Lidocaine patch   Hydroxyzine at bedtime   Ibuprofen PRN     Okay to continue PT to find positions of comfort however will likely need to change regimen given the findings on MRI.  Rest should be the rule for the next 6 weeks.  Plan to reengage physical therapy more rigorously at that time.    Mike Ferreiar MD  [facility] SPORTS MEDICINE CLINIC MAUDE Zamorano is a 46 year old who is being evaluated via a billable telephone visit.      What phone number would you like to be contacted at? [phone]  How would you like to obtain your AVS? MyChart  Phone call duration: 30 minutes    -----  Chief Complaint   Patient presents with     Follow Up     MRI results        SUBJECTIVE  Jaun Lobo is a/an 46 year old male who is seen for follow up of acute on chronic low back pain.  Since our last visit he has had a visit to the ER due to an abrupt increase in low back pain.  Patient reports severe pain when standing off the toilet and an episode of his legs giving out.  This was followed by intense spasm and a sensation that he could not move.  He was then seen in physical therapy.  There are some positions that seem to be helpful however as he transitions out of those positions he experiences severe episodes of pain.  This occurred as recently as this morning.  Pain is generally limited to the lumbar area with no radiation down the legs.  However  with severe pain flares he does have some feelings of weakness and instability in the lower extremities.  No tingling or numbness.  Underwent MRI on 7/15/2021 and is eager to hear results.        REVIEW OF SYSTEMS:    See HPI     OBJECTIVE:  There were no vitals taken for this visit.     Telephone visit.  No exam.    RADIOLOGY:    MRI Lumbar spine 7/15/2021. Per Radiology:     Impression:  1. Resolution of large right central L5-S1 disc extrusion since 2013.  No high-grade spinal canal or neural foraminal narrowing. Mild  progression in mild to moderate neural foraminal narrowing in the  lower lumbar spine.  2. Progression of Schmorl's node deformity in the upper endplate of L3  since 2013, with surrounding marrow edema suggesting that this is  recent. Suspect additional recent posterior inferior endplate  fracture/developing Schmorl's node deformity at L2.

## 2021-07-20 NOTE — TELEPHONE ENCOUNTER
"ALISHA Ceja only    \"Tomorrow morning I have an appointment to [facility] and a possible referral to a spine specialist.\"    Abby Escobar RN, BSN  [facility]       "

## 2021-07-20 NOTE — LETTER
7/20/2021      RE: Jaun Lobo  [address]  [place] [address]         [facility] CLINICS AND SURGERY CENTER  SPORTS & ORTHOPEDIC CLINIC VISIT     Jul 20, 2021        ASSESSMENT & PLAN    46-year-old with acute episode of lumbar spine pain that is likely secondary to acute disc herniation into L3.  Has had severe poorly controlled pain.     Reviewed imaging and assessment with patient in detail.  Also reviewed with Dr. Venancio Burleson of spine surgery.    Complete Medrol Dosepak today.  Gabapentin increase to TID   Given prescription for corset type back brace to be used as needed for comfort  Decrease oxycodone. Can refill if runs out.   Lidocaine patch   Hydroxyzine at bedtime   Ibuprofen PRN     Okay to continue PT to find positions of comfort however will likely need to change regimen given the findings on MRI.  Rest should be the rule for the next 6 weeks.  Plan to reengage physical therapy more rigorously at that time.    Mike Ferreira MD  [facility] SPORTS MEDICINE CLINIC MAUDE Zamorano is a 46 year old who is being evaluated via a billable telephone visit.      What phone number would you like to be contacted at? [phone]  How would you like to obtain your AVS? [employer]  Phone call duration: 30 minutes    -----  Chief Complaint   Patient presents with     Follow Up     MRI results        SUBJECTIVE  Jaun Lobo is a/an 46 year old male who is seen for follow up of acute on chronic low back pain.  Since our last visit he has had a visit to the ER due to an abrupt increase in low back pain.  Patient reports severe pain when standing off the toilet and an episode of his legs giving out.  This was followed by intense spasm and a sensation that he could not move.  He was then seen in physical therapy.  There are some positions that seem to be helpful however as he transitions out of those positions he experiences severe episodes of pain.  This occurred as recently as this morning.  Pain  is generally limited to the lumbar area with no radiation down the legs.  However with severe pain flares he does have some feelings of weakness and instability in the lower extremities.  No tingling or numbness.  Underwent MRI on 7/15/2021 and is eager to hear results.        REVIEW OF SYSTEMS:    See HPI     OBJECTIVE:  There were no vitals taken for this visit.     Telephone visit.  No exam.    RADIOLOGY:    MRI Lumbar spine 7/15/2021. Per Radiology:     Impression:  1. Resolution of large right central L5-S1 disc extrusion since 2013.  No high-grade spinal canal or neural foraminal narrowing. Mild  progression in mild to moderate neural foraminal narrowing in the  lower lumbar spine.  2. Progression of Schmorl's node deformity in the upper endplate of L3  since 2013, with surrounding marrow edema suggesting that this is  recent. Suspect additional recent posterior inferior endplate  fracture/developing Schmorl's node deformity at L2.      Mike Ferreira MD

## 2021-07-21 ENCOUNTER — HOSPITAL ENCOUNTER (OUTPATIENT)
Facility: CLINIC | Age: 46
Setting detail: OBSERVATION
Discharge: HOME | End: 2021-07-23
Attending: EMERGENCY MEDICINE | Admitting: HOSPITALIST
Payer: COMMERCIAL

## 2021-07-21 ENCOUNTER — TELEPHONE (OUTPATIENT)
Dept: ORTHOPEDICS | Facility: CLINIC | Age: 46
End: 2021-07-21

## 2021-07-21 ENCOUNTER — MYC MEDICAL ADVICE (OUTPATIENT)
Dept: FAMILY MEDICINE | Facility: CLINIC | Age: 46
End: 2021-07-21

## 2021-07-21 DIAGNOSIS — F41.1 ANXIETY STATE: ICD-10-CM

## 2021-07-21 DIAGNOSIS — M54.9 INTRACTABLE BACK PAIN: ICD-10-CM

## 2021-07-21 DIAGNOSIS — K59.00 CONSTIPATION, UNSPECIFIED CONSTIPATION TYPE: Primary | ICD-10-CM

## 2021-07-21 LAB
ALBUMIN SERPL-MCNC: 4.2 G/DL (ref 3.4–5)
ALP SERPL-CCNC: 56 U/L (ref 40–150)
ALT SERPL W P-5'-P-CCNC: 27 U/L (ref 0–70)
ANION GAP SERPL CALCULATED.3IONS-SCNC: 4 MMOL/L (ref 3–14)
AST SERPL W P-5'-P-CCNC: 21 U/L (ref 0–45)
BASOPHILS # BLD AUTO: 0.1 10E3/UL (ref 0–0.2)
BASOPHILS NFR BLD AUTO: 1 %
BILIRUB DIRECT SERPL-MCNC: 0.2 MG/DL (ref 0–0.2)
BILIRUB SERPL-MCNC: 1.1 MG/DL (ref 0.2–1.3)
BUN SERPL-MCNC: 16 MG/DL (ref 7–30)
CALCIUM SERPL-MCNC: 9.1 MG/DL (ref 8.5–10.1)
CHLORIDE BLD-SCNC: 103 MMOL/L (ref 94–109)
CK SERPL-CCNC: 137 U/L (ref 30–300)
CO2 SERPL-SCNC: 27 MMOL/L (ref 20–32)
CREAT SERPL-MCNC: 0.9 MG/DL (ref 0.66–1.25)
CRP SERPL-MCNC: <2.9 MG/L (ref 0–8)
EOSINOPHIL # BLD AUTO: 0.1 10E3/UL (ref 0–0.7)
EOSINOPHIL NFR BLD AUTO: 1 %
ERYTHROCYTE [DISTWIDTH] IN BLOOD BY AUTOMATED COUNT: 12.1 % (ref 10–15)
ERYTHROCYTE [SEDIMENTATION RATE] IN BLOOD BY WESTERGREN METHOD: 4 MM/HR (ref 0–15)
GFR SERPL CREATININE-BSD FRML MDRD: >90 ML/MIN/1.73M2
GLUCOSE BLD-MCNC: 109 MG/DL (ref 70–99)
HCT VFR BLD AUTO: 46.5 % (ref 40–53)
HGB BLD-MCNC: 15.9 G/DL (ref 13.3–17.7)
IMM GRANULOCYTES # BLD: 0 10E3/UL
IMM GRANULOCYTES NFR BLD: 1 %
LYMPHOCYTES # BLD AUTO: 1.8 10E3/UL (ref 0.8–5.3)
LYMPHOCYTES NFR BLD AUTO: 27 %
MCH RBC QN AUTO: 30.1 PG (ref 26.5–33)
MCHC RBC AUTO-ENTMCNC: 34.2 G/DL (ref 31.5–36.5)
MCV RBC AUTO: 88 FL (ref 78–100)
MONOCYTES # BLD AUTO: 0.5 10E3/UL (ref 0–1.3)
MONOCYTES NFR BLD AUTO: 7 %
NEUTROPHILS # BLD AUTO: 4.2 10E3/UL (ref 1.6–8.3)
NEUTROPHILS NFR BLD AUTO: 63 %
NRBC # BLD AUTO: 0 10E3/UL
NRBC BLD AUTO-RTO: 0 /100
PLATELET # BLD AUTO: 295 10E3/UL (ref 150–450)
POTASSIUM BLD-SCNC: 4.1 MMOL/L (ref 3.4–5.3)
PROT SERPL-MCNC: 7.6 G/DL (ref 6.8–8.8)
RBC # BLD AUTO: 5.28 10E6/UL (ref 4.4–5.9)
SODIUM SERPL-SCNC: 134 MMOL/L (ref 133–144)
WBC # BLD AUTO: 6.7 10E3/UL (ref 4–11)

## 2021-07-21 PROCEDURE — G0378 HOSPITAL OBSERVATION PER HR: HCPCS

## 2021-07-21 PROCEDURE — 87635 SARS-COV-2 COVID-19 AMP PRB: CPT | Performed by: EMERGENCY MEDICINE

## 2021-07-21 PROCEDURE — 36415 COLL VENOUS BLD VENIPUNCTURE: CPT | Performed by: EMERGENCY MEDICINE

## 2021-07-21 PROCEDURE — 96376 TX/PRO/DX INJ SAME DRUG ADON: CPT

## 2021-07-21 PROCEDURE — 99220 PR INITIAL OBSERVATION CARE,LEVEL III: CPT | Performed by: HOSPITALIST

## 2021-07-21 PROCEDURE — 86140 C-REACTIVE PROTEIN: CPT | Performed by: HOSPITALIST

## 2021-07-21 PROCEDURE — 250N000013 HC RX MED GY IP 250 OP 250 PS 637: Performed by: HOSPITALIST

## 2021-07-21 PROCEDURE — 85004 AUTOMATED DIFF WBC COUNT: CPT | Performed by: EMERGENCY MEDICINE

## 2021-07-21 PROCEDURE — 82374 ASSAY BLOOD CARBON DIOXIDE: CPT | Performed by: EMERGENCY MEDICINE

## 2021-07-21 PROCEDURE — C9803 HOPD COVID-19 SPEC COLLECT: HCPCS

## 2021-07-21 PROCEDURE — 96374 THER/PROPH/DIAG INJ IV PUSH: CPT

## 2021-07-21 PROCEDURE — 82550 ASSAY OF CK (CPK): CPT | Performed by: HOSPITALIST

## 2021-07-21 PROCEDURE — 99285 EMERGENCY DEPT VISIT HI MDM: CPT | Mod: 25

## 2021-07-21 PROCEDURE — 82248 BILIRUBIN DIRECT: CPT | Performed by: HOSPITALIST

## 2021-07-21 PROCEDURE — 250N000011 HC RX IP 250 OP 636: Performed by: HOSPITALIST

## 2021-07-21 PROCEDURE — 250N000011 HC RX IP 250 OP 636: Performed by: EMERGENCY MEDICINE

## 2021-07-21 PROCEDURE — 36592 COLLECT BLOOD FROM PICC: CPT | Performed by: EMERGENCY MEDICINE

## 2021-07-21 PROCEDURE — 85652 RBC SED RATE AUTOMATED: CPT | Performed by: HOSPITALIST

## 2021-07-21 RX ORDER — KETOROLAC TROMETHAMINE 15 MG/ML
15 INJECTION, SOLUTION INTRAMUSCULAR; INTRAVENOUS ONCE
Status: COMPLETED | OUTPATIENT
Start: 2021-07-21 | End: 2021-07-21

## 2021-07-21 RX ORDER — TEMAZEPAM 7.5 MG/1
7.5 CAPSULE ORAL
Status: DISCONTINUED | OUTPATIENT
Start: 2021-07-21 | End: 2021-07-23 | Stop reason: HOSPADM

## 2021-07-21 RX ORDER — IBUPROFEN 600 MG/1
600 TABLET, FILM COATED ORAL EVERY 4 HOURS PRN
Status: DISCONTINUED | OUTPATIENT
Start: 2021-07-21 | End: 2021-07-23 | Stop reason: HOSPADM

## 2021-07-21 RX ORDER — ONDANSETRON 4 MG/1
4 TABLET, ORALLY DISINTEGRATING ORAL EVERY 6 HOURS PRN
Status: DISCONTINUED | OUTPATIENT
Start: 2021-07-21 | End: 2021-07-23 | Stop reason: HOSPADM

## 2021-07-21 RX ORDER — IBUPROFEN 200 MG
600 TABLET ORAL EVERY 4 HOURS PRN
Status: ON HOLD | COMMUNITY
End: 2021-07-23

## 2021-07-21 RX ORDER — MULTIPLE VITAMINS W/ MINERALS TAB 9MG-400MCG
1 TAB ORAL DAILY
COMMUNITY

## 2021-07-21 RX ORDER — HYDRALAZINE HYDROCHLORIDE 20 MG/ML
10 INJECTION INTRAMUSCULAR; INTRAVENOUS EVERY 4 HOURS PRN
Status: DISCONTINUED | OUTPATIENT
Start: 2021-07-21 | End: 2021-07-23 | Stop reason: HOSPADM

## 2021-07-21 RX ORDER — GABAPENTIN 300 MG/1
300 CAPSULE ORAL 3 TIMES DAILY
Status: DISCONTINUED | OUTPATIENT
Start: 2021-07-21 | End: 2021-07-23 | Stop reason: HOSPADM

## 2021-07-21 RX ORDER — NALOXONE HYDROCHLORIDE 0.4 MG/ML
0.2 INJECTION, SOLUTION INTRAMUSCULAR; INTRAVENOUS; SUBCUTANEOUS
Status: DISCONTINUED | OUTPATIENT
Start: 2021-07-21 | End: 2021-07-23 | Stop reason: HOSPADM

## 2021-07-21 RX ORDER — BISACODYL 10 MG
10 SUPPOSITORY, RECTAL RECTAL DAILY
Status: DISCONTINUED | OUTPATIENT
Start: 2021-07-21 | End: 2021-07-23 | Stop reason: HOSPADM

## 2021-07-21 RX ORDER — TRAMADOL HYDROCHLORIDE 50 MG/1
50 TABLET ORAL EVERY 6 HOURS PRN
Status: DISCONTINUED | OUTPATIENT
Start: 2021-07-21 | End: 2021-07-23 | Stop reason: HOSPADM

## 2021-07-21 RX ORDER — HYDROXYZINE PAMOATE 25 MG/1
25 CAPSULE ORAL 4 TIMES DAILY PRN
Status: DISCONTINUED | OUTPATIENT
Start: 2021-07-21 | End: 2021-07-23 | Stop reason: HOSPADM

## 2021-07-21 RX ORDER — ONDANSETRON 2 MG/ML
4 INJECTION INTRAMUSCULAR; INTRAVENOUS EVERY 6 HOURS PRN
Status: DISCONTINUED | OUTPATIENT
Start: 2021-07-21 | End: 2021-07-23 | Stop reason: HOSPADM

## 2021-07-21 RX ORDER — HYDROMORPHONE HCL IN WATER/PF 6 MG/30 ML
0.2 PATIENT CONTROLLED ANALGESIA SYRINGE INTRAVENOUS EVERY 6 HOURS PRN
Status: DISCONTINUED | OUTPATIENT
Start: 2021-07-21 | End: 2021-07-23 | Stop reason: HOSPADM

## 2021-07-21 RX ORDER — ACETAMINOPHEN 325 MG/1
975 TABLET ORAL EVERY 8 HOURS
Status: DISCONTINUED | OUTPATIENT
Start: 2021-07-21 | End: 2021-07-23 | Stop reason: HOSPADM

## 2021-07-21 RX ORDER — NALOXONE HYDROCHLORIDE 0.4 MG/ML
0.4 INJECTION, SOLUTION INTRAMUSCULAR; INTRAVENOUS; SUBCUTANEOUS
Status: DISCONTINUED | OUTPATIENT
Start: 2021-07-21 | End: 2021-07-23 | Stop reason: HOSPADM

## 2021-07-21 RX ORDER — POLYETHYLENE GLYCOL 3350 17 G/17G
17 POWDER, FOR SOLUTION ORAL DAILY
Status: DISCONTINUED | OUTPATIENT
Start: 2021-07-21 | End: 2021-07-22

## 2021-07-21 RX ADMIN — HYDROXYZINE PAMOATE 25 MG: 25 CAPSULE ORAL at 21:01

## 2021-07-21 RX ADMIN — HYDROMORPHONE HYDROCHLORIDE 0.2 MG: 0.2 INJECTION, SOLUTION INTRAMUSCULAR; INTRAVENOUS; SUBCUTANEOUS at 21:09

## 2021-07-21 RX ADMIN — ACETAMINOPHEN 975 MG: 325 TABLET, FILM COATED ORAL at 21:09

## 2021-07-21 RX ADMIN — GABAPENTIN 300 MG: 300 CAPSULE ORAL at 21:00

## 2021-07-21 RX ADMIN — POLYETHYLENE GLYCOL 3350 17 G: 17 POWDER, FOR SOLUTION ORAL at 17:55

## 2021-07-21 RX ADMIN — TRAMADOL HYDROCHLORIDE 50 MG: 50 TABLET, FILM COATED ORAL at 17:55

## 2021-07-21 RX ADMIN — KETOROLAC TROMETHAMINE 15 MG: 15 INJECTION, SOLUTION INTRAMUSCULAR; INTRAVENOUS at 14:36

## 2021-07-21 RX ADMIN — HYDROMORPHONE HYDROCHLORIDE 1 MG: 1 INJECTION, SOLUTION INTRAMUSCULAR; INTRAVENOUS; SUBCUTANEOUS at 14:35

## 2021-07-21 ASSESSMENT — ENCOUNTER SYMPTOMS
DIFFICULTY URINATING: 1
ACTIVITY CHANGE: 1
BACK PAIN: 1
CONSTIPATION: 1
NUMBNESS: 0

## 2021-07-21 ASSESSMENT — MIFFLIN-ST. JEOR: SCORE: 1813.39

## 2021-07-21 NOTE — TELEPHONE ENCOUNTER
"Called Jaun after talking with clinic staff and Dr. Ferreira.     Jaun states he has been experiencing a steady decline in overall health and increase in his daily pain. Despite of taking steroids, and tramadol, he has experienced the greatest pain yet. He has had a couple of spells of \"seizures\" where he can't stand and he falls on the floor and then is immobilize by the pain. IT lasts for 5-15 minutes at a time. He has then sympathetic reactions all over his body in his arms, legs, chest, and throat. He says with the last one he struggles to breathe and felt he couldn't get enough oxygen. He feels extremely weak today.  He states that during his last \"seizure\" the pain hit after he coughed. The time before that was when he tried to get out of bed. He can't sit on the toilet, he can't go #2. They did buy an orthopedic commode yesterday to try to help him get in the position better but he can't push so he can't relieve himself. He is most worried about wrenching his back when these occurrences of pain happen.  He states he is very scared because there is nothing he can do but stand.That is all he has been doing since his last \"seizure\" which was mid-morning. His legs are exhausted but he can't sit or lay down.  I explained that we can't get him directly admitted from the clinic and that we don't have a way to help with his pain. He states he needs a referral to someone who is top notched, he is afraid of going to an ED where he is just sent home or seen by someone who is on call which he doesn't want. He wants someone who knows what he is doing. He really hopes to end up in a hospital somewhere with the right specialist. He really wants Dr. Ferreira to help him get to the right person once he is there.  I again empahsized that we can't help with the pain in clinic, we can work towards a referral to a clinical spine physician but they won't be able to help with the pain he's having currently or his bathroom issues. My " recommendation is that he go to an ED or urgent care for his current spells of pain and that we would contact him if Dr. Ferreira thinks he has a clinical spine person that can see him.   He stated he understood and will go to the ED, [facility] because of his pain.

## 2021-07-21 NOTE — ED TRIAGE NOTES
"Back pain x3 weeks. Seen at urgent care and ED in the past 10 days. Pt ambulated in triage. \"I'm here to get admitted to the hospital for debilitating back pain.\"  "

## 2021-07-21 NOTE — TELEPHONE ENCOUNTER
Pt called into clinic to f/u on this [employer] message.    PT saw sports med provider yesterday.  Reviewed the  MRI.  Sx worsened since his appt yesterday.  He is not getting a response from sports med clinic this am.    PT went to the  [facility] ER this weekend. They wouldn't admit him.   Pt feels he needs a spine specialist ASAP.    Pt apologized for being emotional. He is in a lot of pain.  Pain is SO bad, he feels he needs to die to stop the pain.  Pain is in neck, back , chest. For 10 days.  Pt can't sit to have a BM. Can't push.  Coughing hurts. He collapsed to floor with pain.    PT will go to any facility. He needs a back specialist.  I did tell pt the direction I would recommend is an ER.  They would at least be able to bring his pain down.  Hard to see a spine specialist ASAP.  PT is frustrated with ER. Fears he would get a shot and sent home again.    Pt wanted Marcial to help get him hospitalized.  I will discuss with HERIBERTO Lamas RN

## 2021-07-21 NOTE — PHARMACY-ADMISSION MEDICATION HISTORY
Pharmacy Medication History  Admission medication history interview status for the 7/21/2021  admission is complete. See EPIC admission navigator for prior to admission medications     Location of Interview: Patient room  Medication history sources: Patient and Surescripts    Significant changes made to the medication list:  Removed lidocaine patch, medrol dosepak, tramadol    In the past week, patient estimated taking medication this percent of the time: greater than 90%    Additional medication history information:   Jaun just completed medrol dosepak yesterday 7/21.   Jaun notes he ran out of tramadol but prefers tramadol to oxycodone.     Medication reconciliation completed by provider prior to medication history? No    Time spent in this activity: 15 minutes     Prior to Admission medications    Medication Sig Last Dose Taking? Auth Provider   albuterol (PROAIR HFA, PROVENTIL HFA, VENTOLIN HFA) 108 (90 BASE) MCG/ACT inhaler Inhale 2 puffs into the lungs every 4 hours as needed for shortness of breath / dyspnea or wheezing   at prn Yes Reported, Patient   gabapentin (NEURONTIN) 300 MG capsule Take 1 capsule (300 mg) by mouth 3 times daily 7/21/2021 at x2 Yes Mike Ferreira MD   hydrOXYzine (VISTARIL) 25 MG capsule Take 25 mg by mouth 4 times daily as needed  7/20/2021 at hs Yes Reported, Patient   ibuprofen (ADVIL/MOTRIN) 200 MG tablet Take 600 mg by mouth every 4 hours as needed for mild pain 7/21/2021 at Unknown time Yes Unknown, Entered By History   melatonin 3 MG tablet Take 3 mg by mouth nightly as needed. 7/20/2021 at hs Yes Reported, Patient   multivitamin w/minerals (THERA-VIT-M) tablet Take 1 tablet by mouth daily 7/21/2021 at Unknown time Yes Unknown, Entered By History   oxyCODONE (ROXICODONE) 5 MG tablet Take 1 tablet (5 mg) by mouth every 6 hours as needed for pain 7/20/2021 at Unknown time Yes Remigio Kothari MD   temazepam (RESTORIL) 15 MG capsule Take 1/2 to 1 tablet PO at  bedtime as needed for sleep Past Week at prn Yes Marcial Sotomayor, PACharlneeC       The information provided in this note is only as accurate as the sources available at the time of update(s)

## 2021-07-21 NOTE — TELEPHONE ENCOUNTER
See sports med TE dated 7/21/21.  I did huddle with Marcial.  With the pain pt is in,  Marcial would agree going to a different ER would be the direction for today. [facility] ER.  ER would need to help with his pain until seen by spine doctor.      I reviewed this message with pt.  I wanted to review med usage for pain-  Spasms cause pt to fall to the floor.  PT finished the tramadol today.  Prednisone completed yest. Didn't seem to help.  Lessened on one side and worsened on the other.  Has 7 tabs of oxycodone left.  Gabapentin- not helping but is taking. May help his mood. I recommended  Continuing to help with nerve pain.  Heating pad is some help. Hard to position.    Pain med refills would need to come from sports med.  PT will f/u with sports med by this afternoon. He is hoping they can help him get in with a spine MD soon.  PT did accept the [facility] ER direction, if pain worsens.  PT will update HP team by etta. Pt was appreciative.  ANDRES Lamas

## 2021-07-21 NOTE — H&P
[facility]    History and Physical  Hospitalist    Jaun Lobo MRN# [id number]   Age: 46 year old YOB: 1975     Date of Admission:  7/21/2021    Primary care provider: Marcial Sotomayor          Assessment and Plan:     Jaun Lobo is a 46 year old  male with medical history of chronic back pain, degenerative joint disease presented to the ED with back pain.    Acute on chronic back pain   Acute urinary retention secondary to pain.  Acute constipation secondary to pain, narcotic use, immobility.  Patient reports ongoing back pain for a few weeks now, evaluated multiple times and is also following with [facility] sports and orthopedic clinic.  Had MRI on 7/15 1. Resolution of large right central L5-S1 disc extrusion since 2013. No high-grade spinal canal or neural foraminal narrowing. Mild  progression in mild to moderate neural foraminal narrowing in the lower lumbar spine. 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 Suspect additional recent posterior inferior endplate fracture/developing Schmorl's node deformity at L2.  Had been on steroid trial, completed course yesterday.  Also on scheduled gabapentin, muscle relaxant, gabapentin and oxycodone. Had seen his spine surgeon on 7/20, recommended corset brace.  Has a scheduled appointment next week for brace.  Reports having episodes of painful spasms where he is not able to stand, associated urinary retention since this morning, constipation for 2 days now.  In ED blood pressure 151/85.  No midline tenderness.  No reproducible tenderness over the back.  Strength 5 and 5 in all extremities.  Range of motion slightly restricted in bilateral lower extremity secondary to pain.  Admit to observation unit.  We will check CBC, CMP.  Follow CRP, ESR, CK.  Orthospine consult requested.  Pain management with scheduled Tylenol 3 times a  day.  Tramadol 50 mg every 6 hours as needed for moderate pain.  Reserve IV Dilaudid as needed for severe pain.  Continue PTA gabapentin 3 times a day.  Continue PTA as needed ibuprofen.  As needed warm compress.  Scheduled docusate 2 times a day.  Scheduled MiraLAX daily.  Will also schedule Dulcolax suppository given constipation and discomfort from it.  As needed enema ordered.  Encouraged hydration, high-fiber diet and ambulation.  BladderScan as needed.  Timed voiding. UA to rule out infection.  Physical therapy consult requested.    Elevated blood pressures likely secondary to pain.  History of hypertension.  In ED blood pressure is 151/85.  Monitor blood pressures closely.  As needed IV hydralazine ordered.  Age-appropriate health maintenance on PCP visit.    Insomnia.  Continue PTA Restoril.    COVID-19 PCR test result -to be followed.    DVT Prophylaxis: SCD, ambulate.  Code Status: Full code, discussed.    Disposition: Expected discharge in 1 to 2 days pending clinical improvement.  More than 70% of time spent in direct patient care, care coordination, patient counseling, and formalizing plan of care. Discussed with patient and ED team.     Aden Trimble MD          Chief Complaint:     History is obtained from patient, review of medical records.    Jaun Lobo is a 46 year old  male with medical history of chronic back pain, degenerative joint disease presented to the ED with back pain.  Patient reports ongoing back pain for a few weeks now, evaluated multiple times and is also following with [facility] sports and orthopedic clinic.  Had MRI on 7/15 1. Resolution of large right central L5-S1 disc extrusion since 2013.  No high-grade spinal canal or neural foraminal narrowing. Mild  progression in mild to moderate neural foraminal narrowing in the  lower lumbar spine.  2. Progression of Schmorl's node deformity in the upper endplate of L3  since 2013, with surrounding marrow edema suggesting that  this is  recent. Suspect additional recent posterior inferior endplate  fracture/developing Schmorl's node deformity at L2.  Had been on steroid trial, completed course yesterday.  Also on scheduled gabapentin, muscle relaxant, gabapentin and oxycodone. Had seen his spine surgeon on 7/20, recommended corset brace.  Has a scheduled appointment next week for brace.  Reports having episodes of painful spasms where he is not able to stand.  Reporting cannot find a comfortable position to sleep in, unable to care for himself at home.  Reports has not been able to urinate since this morning secondary to pain.  Reports not had a bowel movement for 2 days secondary to pain.  Ports when he has episodes of painful spasms has shortness of breath to a point where he feels his muscles are convulsing and is going to pass out.  No episode of loss of consciousness at this time.  No new tingling or numbness.  No chest pain or palpitation.  No headache or dizziness.  No nausea or vomiting.  No coughing or blood.  No blood in the stools.  No falls at home.    In ED blood pressure 151/85.  No midline tenderness.  No reproducible tenderness over the back.  Strength 5 and 5 in all extremities.  Range of motion slightly restricted in bilateral lower extremity secondary to pain.         Review of Systems:     GENERAL: no fever or chills  EENT: No new vision changes, no sinus problems  PULMONARY: No shortness of breath, no cough, no wheezing  CARDIAC: no chest pain, no irregular or fast heart beats   GI: No abdominal pain, nausea, vomiting, black or bloody stools  : see HPI   NEURO:  no dizziness, no loss of consciousness  ENDOCRINE: No excessive thirst  MUSCULOSKELETAL: see HPI   SKIN: No skin rashes  PSYCHIATRY no anxiety    Medical History:     Past Medical History:   Diagnosis Date     Alcohol dependence (H)      Allergic rhinitis, cause unspecified     allergy shots as kid     Anxiety state, unspecified     on [facility]  disorder     degenerative      DDD (degenerative disc disease)      Esophagitis      Lichen planus   2007     MEDICAL HISTORY OF - 3/10/10    ulcerative esophagitis     Mild intermittent asthma     rare use of albuteral     Tobacco use disorder     quit 2002     Unspecified hemorrhoids without mention of complication         Surgical History:      Past Surgical History:   Procedure Laterality Date     CL AFF SURGICAL PATHOLOGY       ESOPHAGOSCOPY, GASTROSCOPY, DUODENOSCOPY (EGD), COMBINED  5/9/2014    Procedure: COMBINED ESOPHAGOSCOPY, GASTROSCOPY, DUODENOSCOPY (EGD), BIOPSY SINGLE OR MULTIPLE;  Surgeon: Yanely Macias MD;  Location: [facility]     wisdom teeth       ZZC NONSPECIFIC PROCEDURE      nasal fx; left clavic fx     ZZC NONSPECIFIC PROCEDURE  12/04    normal colonoscopy; rpt age 49             Social History:      Social History     Tobacco Use     Smoking status: Former Smoker     Smokeless tobacco: Never Used   Substance Use Topics     Alcohol use: No     Alcohol/week: 16.7 standard drinks     Types: 20 Cans of beer per week     Comment: 40 drinks a week , quit drinking 6 weeks ago              Family History:     Family History   Problem Relation Age of Onset     Cerebrovascular Disease Maternal Grandfather         had strokes in his 80's     Gastrointestinal Disease Mother         ulcerative colitis     Anxiety Disorder Mother      Glaucoma Paternal Grandmother      Macular Degeneration Paternal Grandmother      Cancer Other         esophageal cancer, cousin     C.A.D. No family hx of      Diabetes No family hx of      Hypertension No family hx of      Breast Cancer No family hx of      Cancer - colorectal No family hx of      Prostate Cancer No family hx of      Unknown/Adopted No family hx of      Depression No family hx of      Schizophrenia No family hx of      Bipolar Disorder No family hx of      Suicide No family hx of      Substance Abuse No family hx of      Dementia No family hx of       Atlantic Disease No family hx of      Parkinsonism No family hx of      Autism Spectrum Disorder No family hx of      Intellectual Disability (Mental Retardation) No family hx of      Mental Illness No family hx of              Allergies:     Allergies   Allergen Reactions     Antabuse [Disulfiram] Other (See Comments)     hepatitis     Campral [Acamprosate] Other (See Comments)     Mood changes     Animal Dander      Dust Mite Extract      No Known Drug Allergies      Ragweeds      goldenrod     Seasonal Allergies              Medications:   Home meds reviewed          Physical Exam      Admission Weight: 88.5 kg (195 lb)    Vital Signs with Ranges  Temp:  [97.9  F (36.6  C)] 97.9  F (36.6  C)  Pulse:  [101] 101  Resp:  [18] 18  BP: (151)/(85) 151/85  SpO2:  [97 %] 97 %    PHYSICAL EXAM  GENERAL: Patient is in no distress. Alert and oriented.  HEENT: Oropharynx pink, moist.  HEART: Regular rate and rhythm. S1S2. No murmurs  LUNGS: Clear to auscultation bilaterally. No expiratory wheeze.  Respirations unlabored  ABDOMEN: Soft, no abdominal tenderness, bowel sounds heard   NEURO: Cranial nerves II-XII intact. Motor and sensory system in normal range  Musculoskeletal No midline tenderness.  No reproducible tenderness over the back.  Strength 5 and 5 in all extremities.  Range of motion slightly restricted in bilateral lower extremity secondary to pain.  EXTREMITIES: No pedal edema.   SKIN: Warm, dry. No rash or bruising.  PSYCHIATRY Cooperative         Data:   All new lab and imaging data was reviewed.

## 2021-07-21 NOTE — TELEPHONE ENCOUNTER
ED / Discharge Outreach Protocol    Patient Contact    Attempt # 2    Was call answered?  No.  Left message on voicemail with information to call me back.    Shannan De Los Santos RN

## 2021-07-21 NOTE — ED NOTES
"[facility]  ED Nurse Handoff Report    ED Chief complaint: Back Pain      ED Diagnosis:   Final diagnoses:   None       Code Status:  To be addressed by admitting provider    Allergies:   Allergies   Allergen Reactions     Antabuse [Disulfiram] Other (See Comments)     hepatitis     Campral [Acamprosate] Other (See Comments)     Mood changes     Animal Dander      Dust Mite Extract      No Known Drug Allergies      Ragweeds      goldenrod     Seasonal Allergies         Patient Story: Lumbar back pain x3 weeks. Had MRI on 7/15, showed a node deformity. Has intermittent \"spasms/seizures\" of 10/10 pain. Unable to manage pain at home with oral meds or perform ADLS. No BM x2 days due to pain. Position of comfort is standing, has been standing x8 hours.    Focused Assessment:  A&Ox4. Assist x2 to laying on cart, after 1mg Dilaudid. Needs pain control. Calm, cooperative. 20G LFA.    Treatments and/or interventions provided:   Medications   sodium chloride (PF) 0.9% PF flush 3 mL (has no administration in time range)   sodium chloride (PF) 0.9% PF flush 3 mL (has no administration in time range)   HYDROmorphone (DILAUDID) injection 1 mg (1 mg Intravenous Given 7/21/21 1435)   ketorolac (TORADOL) injection 15 mg (15 mg Intravenous Given 7/21/21 1436)     Patient's response to treatments and/or interventions: decreased pain, able to change positions.    To be done/followed up on inpatient unit:  pain control    Does this patient have any cognitive concerns?: NA    Activity level - Baseline/Home:  Independent  Activity Level - Current:   Assist x1    Patient's Preferred language: English   Needed?: No    Isolation: None  Infection: Not Applicable  Patient tested for COVID 19 prior to admission: YES  Bariatric?: No    Vital Signs:   Vitals:    07/21/21 1412   BP: (!) 151/85   Pulse: 101   Resp: 18   Temp: 97.9  F (36.6  C)   TempSrc: Temporal   SpO2: 97%   Weight: 88.5 kg (195 lb)   Height: 1.854 m " "(6' 1\")       Cardiac Rhythm:     Was the PSS-3 completed:   Yes  What interventions are required if any?               Family Comments: NA  OBS brochure/video discussed/provided to patient/family: Yes            For the majority of the shift this patient's behavior was Green.   Behavioral interventions performed were NA.    ED NURSE PHONE NUMBER: *[id number]         "

## 2021-07-21 NOTE — PROGRESS NOTES
RECEIVING UNIT ED HANDOFF REVIEW    ED Nurse Handoff Report was reviewed by: Kevin Lees RN on July 21, 2021 at 4:54 PM

## 2021-07-22 ENCOUNTER — APPOINTMENT (OUTPATIENT)
Dept: PHYSICAL THERAPY | Facility: CLINIC | Age: 46
End: 2021-07-22
Attending: HOSPITALIST
Payer: COMMERCIAL

## 2021-07-22 LAB
ALBUMIN UR-MCNC: NEGATIVE MG/DL
APPEARANCE UR: CLEAR
BILIRUB UR QL STRIP: NEGATIVE
COLOR UR AUTO: ABNORMAL
GLUCOSE UR STRIP-MCNC: NEGATIVE MG/DL
HGB UR QL STRIP: NEGATIVE
KETONES UR STRIP-MCNC: NEGATIVE MG/DL
LEUKOCYTE ESTERASE UR QL STRIP: NEGATIVE
MUCOUS THREADS #/AREA URNS LPF: PRESENT /LPF
NITRATE UR QL: NEGATIVE
PH UR STRIP: 7 [PH] (ref 5–7)
RBC URINE: 1 /HPF
SARS-COV-2 RNA RESP QL NAA+PROBE: NEGATIVE
SP GR UR STRIP: 1.01 (ref 1–1.03)
UROBILINOGEN UR STRIP-MCNC: NORMAL MG/DL
WBC URINE: 1 /HPF

## 2021-07-22 PROCEDURE — 99226 PR SUBSEQUENT OBSERVATION CARE,LEVEL III: CPT | Performed by: HOSPITALIST

## 2021-07-22 PROCEDURE — 97530 THERAPEUTIC ACTIVITIES: CPT | Mod: GP

## 2021-07-22 PROCEDURE — 81001 URINALYSIS AUTO W/SCOPE: CPT | Performed by: HOSPITALIST

## 2021-07-22 PROCEDURE — 96376 TX/PRO/DX INJ SAME DRUG ADON: CPT

## 2021-07-22 PROCEDURE — G0378 HOSPITAL OBSERVATION PER HR: HCPCS

## 2021-07-22 PROCEDURE — 250N000011 HC RX IP 250 OP 636: Performed by: HOSPITALIST

## 2021-07-22 PROCEDURE — 250N000013 HC RX MED GY IP 250 OP 250 PS 637: Performed by: HOSPITALIST

## 2021-07-22 PROCEDURE — 97161 PT EVAL LOW COMPLEX 20 MIN: CPT | Mod: GP

## 2021-07-22 RX ORDER — MELOXICAM 7.5 MG/1
7.5 TABLET ORAL DAILY
Status: DISCONTINUED | OUTPATIENT
Start: 2021-07-22 | End: 2021-07-23 | Stop reason: HOSPADM

## 2021-07-22 RX ORDER — POLYETHYLENE GLYCOL 3350 17 G/17G
17 POWDER, FOR SOLUTION ORAL 2 TIMES DAILY
Status: DISCONTINUED | OUTPATIENT
Start: 2021-07-22 | End: 2021-07-23 | Stop reason: HOSPADM

## 2021-07-22 RX ORDER — CYCLOBENZAPRINE HCL 10 MG
10 TABLET ORAL EVERY 8 HOURS PRN
Status: DISCONTINUED | OUTPATIENT
Start: 2021-07-22 | End: 2021-07-23 | Stop reason: HOSPADM

## 2021-07-22 RX ORDER — TRAMADOL HYDROCHLORIDE 50 MG/1
50 TABLET ORAL EVERY 6 HOURS PRN
Qty: 30 TABLET | Refills: 0 | Status: SHIPPED | OUTPATIENT
Start: 2021-07-22 | End: 2021-07-23

## 2021-07-22 RX ORDER — AMOXICILLIN 250 MG
2 CAPSULE ORAL 2 TIMES DAILY
Status: DISCONTINUED | OUTPATIENT
Start: 2021-07-22 | End: 2021-07-23 | Stop reason: HOSPADM

## 2021-07-22 RX ADMIN — HYDROMORPHONE HYDROCHLORIDE 0.2 MG: 0.2 INJECTION, SOLUTION INTRAMUSCULAR; INTRAVENOUS; SUBCUTANEOUS at 14:56

## 2021-07-22 RX ADMIN — ACETAMINOPHEN 975 MG: 325 TABLET, FILM COATED ORAL at 06:34

## 2021-07-22 RX ADMIN — SENNOSIDES AND DOCUSATE SODIUM 2 TABLET: 8.6; 5 TABLET ORAL at 08:05

## 2021-07-22 RX ADMIN — GABAPENTIN 300 MG: 300 CAPSULE ORAL at 08:05

## 2021-07-22 RX ADMIN — HYDROXYZINE PAMOATE 25 MG: 25 CAPSULE ORAL at 21:44

## 2021-07-22 RX ADMIN — POLYETHYLENE GLYCOL 3350 17 G: 17 POWDER, FOR SOLUTION ORAL at 08:05

## 2021-07-22 RX ADMIN — TRAMADOL HYDROCHLORIDE 50 MG: 50 TABLET, FILM COATED ORAL at 13:37

## 2021-07-22 RX ADMIN — HYDROXYZINE PAMOATE 25 MG: 25 CAPSULE ORAL at 08:06

## 2021-07-22 RX ADMIN — HYDROXYZINE PAMOATE 25 MG: 25 CAPSULE ORAL at 13:37

## 2021-07-22 RX ADMIN — GABAPENTIN 300 MG: 300 CAPSULE ORAL at 13:37

## 2021-07-22 RX ADMIN — MAGNESIUM HYDROXIDE 30 ML: 400 SUSPENSION ORAL at 14:55

## 2021-07-22 RX ADMIN — BISACODYL 10 MG: 10 SUPPOSITORY RECTAL at 17:14

## 2021-07-22 RX ADMIN — MELOXICAM 7.5 MG: 7.5 TABLET ORAL at 14:49

## 2021-07-22 RX ADMIN — IBUPROFEN 600 MG: 600 TABLET ORAL at 11:16

## 2021-07-22 RX ADMIN — GABAPENTIN 300 MG: 300 CAPSULE ORAL at 20:35

## 2021-07-22 RX ADMIN — TRAMADOL HYDROCHLORIDE 50 MG: 50 TABLET, FILM COATED ORAL at 08:06

## 2021-07-22 RX ADMIN — TRAMADOL HYDROCHLORIDE 50 MG: 50 TABLET, FILM COATED ORAL at 20:39

## 2021-07-22 RX ADMIN — ACETAMINOPHEN 975 MG: 325 TABLET, FILM COATED ORAL at 17:12

## 2021-07-22 NOTE — PROVIDER NOTIFICATION
MD Notification    Notified Person: MD    Notified Person Name:Zee    Notification Date/Time:7/22/21@0625    Notification Interaction:paged    Purpose of Notification:Pt's PVR was 536, after voiding 800cc, wanted to try voiding more, waited a bit, tried to void but unable, repeat bladder scan now is 566, need order to S/C,    Orders Received:order to s/c and to bladder scan to ensure that bladder is at no point >500    Comments:

## 2021-07-22 NOTE — PLAN OF CARE
Observation goals PRIOR TO DISCHARGE        Comments:      -Diagnostic tests and consults completed and resulted-Not met     -Vital signs normal or at patient baseline -Met    -Tolerating oral intake to maintain hydration -Met    Nurse to notify provider when observation goals have been met and patient is ready for discharge.    A&Ox4, VSS on RA, PVR this AM=57, pain fairly controlled w/scheduled & PRN meds, ortho consult complete, awaiting PT. Pt c/o constipation but refused supp this AM stating he will take miralax & senna 1 & if he didn't feel better, then he would be receptive to take supp. No surgical intervention needed. Will continue to monitor.

## 2021-07-22 NOTE — PLAN OF CARE
Observation goals PRIOR TO DISCHARGE     Comments: -diagnostic tests and consults completed and resulted - cardio to see  -vital signs normal or at patient baseline - not met, HTN & uncontrolled chest px  -tolerating oral intake to maintain hydration - met  Nurse to notify provider when observation goals have been met and patient is ready for discharge.    A&Ox4. VSS on RA ex HTN. Prn hydroxyzine x1 for anxiety; prn tramadol x1, prn dilaudid x1, & sched tylenol for c/o back pain. Alternating heat & cold application to back. SBA. Voiding in BR w/ adequate UOP, bladder scan every shift; pt c/o of constipation, sched miralax given. Reg diet. L PIV SL. UA needed. Spine surg, PT, SW/CM consult. Plan pending.

## 2021-07-22 NOTE — PLAN OF CARE
[id number]: A&Ox4. VSS on RA. Ambulated in hallway and room ind. Wearing corset back brace. C/o lower back pain 4/10, states much improved since PT and orthotics brace fitting this afternoon. Tylenol given, PRN flexeril and ultram available. Constipated: Senna, miralax, milk of mag given this AM w/o success. Suppository given this evening w/ good results. Encouraging prune juice, hydration, and continued bowel regimen. Ronald reg diet. IV SL. Ortho signed off. PT rec OP PT. Likely discharge tomorrow pending continued pain control and BM.

## 2021-07-22 NOTE — TELEPHONE ENCOUNTER
RECORDS RECEIVED FROM: back pain, lumbar   DATE RECEIVED: Jul 27, 2021     NOTES STATUS DETAILS   OFFICE NOTE from referring provider Internal  Mike Ferreira MD        OFFICE NOTE from other specialist N/A    DISCHARGE SUMMARY from hospital N/A    DISCHARGE REPORT from the ER N/A    OPERATIVE REPORT N/A    MEDICATION LIST Internal    IMPLANT RECORD/STICKER N/A    LABS     CBC/DIFF N/A    CULTURES N/A    INJECTIONS DONE IN RADIOLOGY N/A    MRI Internal    CT SCAN N/A    XRAYS (IMAGES & REPORTS) N/A    TUMOR     PATHOLOGY  Slides & report N/A    07/22/21   10:46 AM   COMPLETE  Eden Maddox CMA

## 2021-07-22 NOTE — PROGRESS NOTES
07/22/21 1400   Quick Adds   Quick Adds Certification   Type of Visit Initial PT Evaluation   Living Environment   People in home spouse;child(otto), dependent   Current Living Arrangements house   Living Environment Comments Patient lives in a 2 story house with his wife and baby.    Self-Care   Usual Activity Tolerance excellent   Current Activity Tolerance fair   Regular Exercise Yes   Activity/Exercise Type running/jogging;strength training;other (see comments)  (Yoga)   Exercise Amount/Frequency daily   Equipment Currently Used at Home none   Activity/Exercise/Self-Care Comment Patient is very active at baseline, typically does yoga 2x/day.    Disability/Function   Hearing Difficulty or Deaf no   Wear Glasses or Blind no   Concentrating, Remembering or Making Decisions Difficulty no   Difficulty Communicating no   Difficulty Eating/Swallowing no   Fall history within last six months no   General Information   Onset of Illness/Injury or Date of Surgery 07/21/21   Referring Physician Aden Trimble MD   Patient/Family Therapy Goals Statement (PT) to reduce back pain   Pertinent History of Current Problem (include personal factors and/or comorbidities that impact the POC) Patient is 45 YO M admitted with acute on chronic low back pain. He was admitted for pain control of L3 Schmorl's node. He has been seen by ortho MD with recommendations for pain management.    Existing Precautions/Restrictions spinal   Weight-Bearing Status - LUE full weight-bearing   Weight-Bearing Status - RUE full weight-bearing   Weight-Bearing Status - LLE full weight-bearing   Weight-Bearing Status - RLE full weight-bearing   General Observations Patient standing in room upon arrival, agreeable to PT.    Cognition   Orientation Status (Cognition) oriented x 4   Affect/Mental Status (Cognition) anxious   Follows Commands (Cognition) WNL   Cognitive Status Comments Patient verbalizing anxieties about back pain and limitations he has  throughout session.    Pain Assessment   Patient Currently in Pain Yes, see Vital Sign flowsheet  (6/10 low back)   Integumentary/Edema   Integumentary/Edema no deficits were identifed   Posture    Posture Comments Patient with very stiff posture throughout session.    Range of Motion (ROM)   ROM Quick Adds ROM WFL  (B LE)   Strength   Manual Muscle Testing Quick Adds Strength WFL  (for gait and transfers)   Strength Comments Formal MMT deferred due to pain   Bed Mobility   Bed Mobility supine-sit;sit-supine   Supine-Sit [place] (Bed Mobility) supervision   Sit-Supine [place] (Bed Mobility) supervision   Impairments Contributing to Impaired Bed Mobility pain   Transfers   Transfers sit-stand transfer   Sit-Stand Transfer   Sit-Stand [place] (Transfers) supervision   Sit/Stand Transfer Comments Sit <> stand from bed with SBA for cueing   Gait/Stairs (Locomotion)   [place] Level (Gait) supervision   Pattern (Gait) step-through   Deviations/Abnormal Patterns (Gait) yun decreased   Comment (Gait/Stairs) Patient ambulated approximately 20' across room with no AD, supervision for safety and very guarded posture due to pain.    Balance   Balance Comments Good sitting balance; No losses of balance with ambulation and no unsteadiness observed, formal testing deferred due to acuity of back pain.    Sensory Examination   Sensory Perception patient reports no sensory changes   Coordination   Coordination no deficits were identified   Clinical Impression   Criteria for Skilled Therapeutic Intervention yes, treatment indicated   PT Diagnosis (PT) impaired mobility   Influenced by the following impairments low back pain   Functional limitations due to impairments increased difficulty with bed mobility, transfers and gait   Clinical Presentation Stable/Uncomplicated   Clinical Presentation Rationale medical status, clinica judgement   Clinical Decision Making (Complexity) low complexity   Therapy Frequency  (PT) One time eval and treatment only   Predicted Duration of Therapy Intervention (days/wks) 1 day   Planned Therapy Interventions (PT) bed mobility training;postural re-education;patient/family education;transfer training   Risk & Benefits of therapy have been explained evaluation/treatment results reviewed;care plan/treatment goals reviewed;risks/benefits reviewed;current/potential barriers reviewed;participants voiced agreement with care plan;participants included;patient   PT Discharge Planning    PT Discharge Recommendation (DC Rec) home with assist;home with outpatient physical therapy   PT Rationale for DC Rec Patient currently limited by pain but able to safely mobilize without assist and demonstrates good understanding of incorporating spinal precautions to support low back. He has a wife at home to assist with lifting and carrying tasks as needed. OP PT for low back pain.    Therapy Certification   Start of care date 07/22/21   Certification date from 07/22/21   Certification date to 07/22/21   Medical Diagnosis intractable low back pain   Total Evaluation Time   Total Evaluation Time (Minutes) 8

## 2021-07-22 NOTE — PROGRESS NOTES
[facility]    Hospitalist Progress Note      Assessment & Plan   Jaun Lobo is a 46 year old male who was admitted on 7/21/2021 with back pain    Acute on chronic back pain   Acute urinary retention secondary to pain.  Acute constipation secondary to pain, narcotic use, immobility.  Patient reports ongoing back pain for a few weeks now, evaluated multiple times and is also following with [facility] sports and orthopedic clinic.  Had MRI on 7/15 1. Resolution of large right central L5-S1 disc extrusion since 2013. No high-grade spinal canal or neural foraminal narrowing. Mild  progression in mild to moderate neural foraminal narrowing in the lower lumbar spine. 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 Suspect additional recent posterior inferior endplate fracture/developing Schmorl's node deformity at L2.  Had been on steroid trial, completed course 7/20.  Also on scheduled gabapentin, muscle relaxant and oxycodone. Had seen his spine surgeon on 7/20, recommended corset brace.  Reports having episodes of painful spasms where he is not able to stand, associated urinary retention since morning of admission and no BM since 2 days prior to admit.   - appreciate ortho spine eval, may take several weeks to improve and months to resolve  -----Recommend follow-up with one of the nonoperative spine providers within [facility] orthopedics.  Can call [phone] to set up an appointment.  - appreciate orthotics--corset brace ordered for patient  - tylenol TID, meloxicam daily  - tramadol 50mg q6h prn for mod-severe pain (ordered 30 tramadol for discharge--filled in our pharmacy on 7/22)  - Continue PTA gabapentin 3 times a day.  - add flexeril 10mg q8h PRN, see if this helps any  - Continue PTA as needed ibuprofen.  - BID docusate, miralax BID, milk of mag PRN, dulcolax  - encouraged ambulation as  able with brace in place  - appreciate PT, plan for outpatient PT on discharge      Elevated blood pressures likely secondary to pain.  History of hypertension.  In ED blood pressure is 151/85.  - attempt for good pain management  - follow up outpatient with PCP for checks once out of acute pain period     Insomnia.  Continue PTA Restoril.     COVID-19 screening-negative    Risk Factors Present on Admission                    DVT Prophylaxis: Ambulate every shift  Code Status: Full Code  Expected discharge: 7/23/2021 recommended to prior living arrangement once pain controlled and has BM    Clarissa Villanueva,   Hospitalist Service  [facility]  Securely message with the Vocera Web Console (learn more here)  Text Page (7am - 6pm) via "[employer] Paging/Directory      Interval History   Patient seen and examined. Doing okay. Met with ortho spine earlier and received recommendations for further follow-up and medications for pain, as well as time course for healing. Answered questions regarding anti-inflammatory, why we use certain medications. Discussed constipation. No chest pain, shortness of breath. Did have issue with retention overnight, but could also be related to constipation.  Spent >35 minutes reviewing chart, discussing care plan with patient, RN/PT, evaluating patient.    -Data reviewed today: I reviewed all new labs and imaging results over the last 24 hours. I personally reviewed no images or EKG's today.    Physical Exam   Temp: (!) 95.8  F (35.4  C) Temp src: Oral BP: (!) 144/85 Pulse: 79   Resp: 16 SpO2: 96 % O2 Device: None (Room air)    Vitals:    07/21/21 1412   Weight: 88.5 kg (195 lb)     Vital Signs with Ranges  Temp:  [95.7  F (35.4  C)-95.9  F (35.5  C)] 95.8  F (35.4  C)  Pulse:  [55-80] 79  Resp:  [16-18] 16  BP: (114-144)/(65-85) 144/85  SpO2:  [96 %-100 %] 96 %  I/O last 3 completed shifts:  In: -   Out: 1500 [Urine:1500]    Constitutional: Awake, alert, cooperative, no  apparent distress  Respiratory: Clear to auscultation bilaterally, no crackles or wheezing  Cardiovascular: Regular rate and rhythm, normal S1 and S2, and no murmur noted  GI: Normal bowel sounds, soft, non-distended, non-tender  Skin/Integumen: No rashes, no cyanosis, no edema  Other: Moves slowly, very stiff within bed.    Medications       acetaminophen  975 mg Oral Q8H     bisacodyl  10 mg Rectal Daily     gabapentin  300 mg Oral TID     meloxicam  7.5 mg Oral Daily     polyethylene glycol  17 g Oral BID     senna-docusate  2 tablet Oral BID       Data   Recent Labs   Lab 07/21/21  1425   WBC 6.7   HGB 15.9   MCV 88         POTASSIUM 4.1   CHLORIDE 103   CO2 27   BUN 16   CR 0.90   ANIONGAP 4   NAZARIO 9.1   *   ALBUMIN 4.2   PROTTOTAL 7.6   BILITOTAL 1.1   ALKPHOS 56   ALT 27   AST 21       No results found for this or any previous visit (from the past 24 hour(s)).

## 2021-07-22 NOTE — PLAN OF CARE
Observation goals PRIOR TO DISCHARGE       Comments:     -Diagnostic tests and consults completed and resulted-Not met     -Vital signs normal or at patient baseline -Met    -Tolerating oral intake to maintain hydration -Met    Nurse to notify provider when observation goals have been met and patient is ready for discharge.

## 2021-07-22 NOTE — PROGRESS NOTES
"Lab called regarding Covid result \"in process\" since yesterday. Lab asked to recheck result in 20 min.  "

## 2021-07-22 NOTE — PLAN OF CARE
Physical Therapy Discharge Summary    Reason for therapy discharge:    Acute PT goals met.    Progress towards therapy goal(s). See goals on Care Plan in [facility] electronic health record for goal details.  Goals met    Therapy recommendation(s):    Continued therapy is recommended.  Rationale/Recommendations:  outpatient PT to address low back pain.

## 2021-07-22 NOTE — PROVIDER NOTIFICATION
Brief update:  Patient with urinary retention on postvoid bladder scan    Straight cath orders placed    Juan Zee MD  6:26 AM

## 2021-07-22 NOTE — CONSULTS
Orthopedic Surgery Consult / History and Physical    Jaun Lobo MRN# [id number]   Age: 46 year old YOB: 1975     Date of Admission:   7/21/2021  Date of Consult: 07/22/21   Location:    [facility]             Assessment and Plan:   Assessment:  Acute L3 Schmorl's node as source of back pain     Plan:  1. Nonoperative management.  2. Pain control as able.  Recommend anti-inflammatory medications (suggest meloxicam or similar), antispasmodics.  Minimize opiates as able.  3. Avoid heavy lifting, excessive bending, twisting  4. Activity as tolerated  5. I extensively discussed with the patient the expected clinical course .  Noted that it may be several weeks for the symptoms to improve and resolve, but there is unfortunately not a discrete intervention besides symptomatic control that can be provided.  6. Recommend follow-up with one of the nonoperative spine providers within [facility] orthopedics.  Can call [phone] to set up an appointment.             Chief Complaint:   Back pain           History of Present Illness:   This patient is a 46 year old male who presents with 2 weeks of back pain.    Atraumatic onset approximately 2 weeks ago of severe back pain, associated with muscle spasm.  Unfortunate this is been progressive over that amount of time.  He was initially seen in the orthopedic urgent care clinic a couple weeks ago, sent out with a Medrol Dosepak.  He did not find that this significantly helped his symptoms.  Due to ongoing severe pain, he eventually presented to the emergency department here yesterday.  An MRI was obtained at that time and the diagnosis of an acute Schmorl's node in L3 was made.  Due to the patient's severe incapacitating pain, he was admitted for pain control.    At time my evaluation he reports the pain is isolated in the low back.  No radicular symptoms in the lower extremities.  No numbness or weakness in the lower extremities.   Sitting, twisting tend to be most bothersome for him.  Laying down at rest or standing for 10 to be less bothersome.     Symptom Profile  Location of symptoms:   Low back  Onset:   Atraumatic, 3 weeks ago  Quality of symptoms:   Achy  Severity:   Severe  Exacerbating:    Twisting, bending, sitting  Alleviate:   Standing, laying down  Previous Treatments: Medrol Dosepak            Past Medical History:   Degenerative disc disease  Past Medical History:   Diagnosis Date     Alcohol dependence (H)      Allergic rhinitis, cause unspecified     allergy shots as kid     Anxiety state, unspecified     on [facility]     Back disorder     degenerative      DDD (degenerative disc disease)      Esophagitis      Lichen planus   2007     MEDICAL HISTORY OF - 3/10/10    ulcerative esophagitis     Mild intermittent asthma     rare use of albuteral     Tobacco use disorder     quit 2002     Unspecified hemorrhoids without mention of complication             Past Surgical History:     Past Surgical History:   Procedure Laterality Date     CL AFF SURGICAL PATHOLOGY       ESOPHAGOSCOPY, GASTROSCOPY, DUODENOSCOPY (EGD), COMBINED  5/9/2014    Procedure: COMBINED ESOPHAGOSCOPY, GASTROSCOPY, DUODENOSCOPY (EGD), BIOPSY SINGLE OR MULTIPLE;  Surgeon: Yanely Macias MD;  Location: [facility]     wisdom teeth       ZZC NONSPECIFIC PROCEDURE      nasal fx; left clavic fx     ZZC NONSPECIFIC PROCEDURE  12/04    normal colonoscopy; rpt age 49            Social History:   Smoking: None  Living situation: At home with family  Ambulatory status: Independent    Social History     Tobacco Use     Smoking status: Former Smoker     Smokeless tobacco: Never Used   Substance Use Topics     Alcohol use: No     Alcohol/week: 16.7 standard drinks     Types: 20 Cans of beer per week     Comment: 40 drinks a week , quit drinking 6 weeks ago             Family History:   Denies family history of bleeding or clotting disorders  Family History   Problem  Relation Age of Onset     Cerebrovascular Disease Maternal Grandfather         had strokes in his 80's     Gastrointestinal Disease Mother         ulcerative colitis     Anxiety Disorder Mother      Glaucoma Paternal Grandmother      Macular Degeneration Paternal Grandmother      Cancer Other         esophageal cancer, cousin     C.A.D. No family hx of      Diabetes No family hx of      Hypertension No family hx of      Breast Cancer No family hx of      Cancer - colorectal No family hx of      Prostate Cancer No family hx of      Unknown/Adopted No family hx of      Depression No family hx of      Schizophrenia No family hx of      Bipolar Disorder No family hx of      Suicide No family hx of      Substance Abuse No family hx of      Dementia No family hx of      Kimball Disease No family hx of      Parkinsonism No family hx of      Autism Spectrum Disorder No family hx of      Intellectual Disability (Mental Retardation) No family hx of      Mental Illness No family hx of             Allergies:     Allergies   Allergen Reactions     Antabuse [Disulfiram] Other (See Comments)     hepatitis     Campral [Acamprosate] Other (See Comments)     Mood changes     Animal Dander      Dust Mite Extract      No Known Drug Allergies      Ragweeds      goldenrod     Seasonal Allergies             Medications:   Anticoagulants: None  Medications Prior to Admission   Medication Sig Dispense Refill Last Dose     albuterol (PROAIR HFA, PROVENTIL HFA, VENTOLIN HFA) 108 (90 BASE) MCG/ACT inhaler Inhale 2 puffs into the lungs every 4 hours as needed for shortness of breath / dyspnea or wheezing     at prn     gabapentin (NEURONTIN) 300 MG capsule Take 1 capsule (300 mg) by mouth 3 times daily 90 capsule 1 7/21/2021 at x2     hydrOXYzine (VISTARIL) 25 MG capsule Take 25 mg by mouth 4 times daily as needed    7/20/2021 at hs     ibuprofen (ADVIL/MOTRIN) 200 MG tablet Take 600 mg by mouth every 4 hours as needed for mild pain    7/21/2021 at Unknown time     melatonin 3 MG tablet Take 3 mg by mouth nightly as needed.   7/20/2021 at hs     multivitamin w/minerals (THERA-VIT-M) tablet Take 1 tablet by mouth daily   7/21/2021 at Unknown time     oxyCODONE (ROXICODONE) 5 MG tablet Take 1 tablet (5 mg) by mouth every 6 hours as needed for pain 12 tablet 0 7/20/2021 at Unknown time     temazepam (RESTORIL) 15 MG capsule Take 1/2 to 1 tablet PO at bedtime as needed for sleep 30 capsule 1 Past Week at prn             Review of Systems:   A 10 point review of systems was performed, and was negative except as noted in HPI.         Physical Exam:     Patient Vitals for the past 8 hrs:   BP Temp Temp src Pulse Resp SpO2   07/22/21 1151 121/75 (!) 95.9  F (35.5  C) Axillary 66 16 96 %   07/22/21 0734 135/80 (!) 95.8  F (35.4  C) Axillary 71 16 100 %   07/22/21 0435 134/73 -- -- 60 16 99 %       General: awake, alert, cooperative, no apparent distress, appears stated age  HEENT: normal  Respiratory: breathing non-labored  Cardiovascular: skin warm and well perfused  Skin: no rashes or lesions  Abdomen:  non-distended  Lymph:  No abnormal swelling of uninjured extremities  Heme:  No petechiae  Neurological: CN II-XII grossly intact  Musculoskeletal:       Spine:   Skin: intact over lumbar spine without evidence of infection    Sensation:      R       L    L3:   Intact   Intact    L4:   Intact   Intact    L5:   Intact   Intact    S1:   Intact   Intact     Motor:     R L    L3: Psoas    5  5    L4:   Quad    5  5    L4: TA   5 5    L5: EHL    5  5    S1: Eversion/PF  5  5               Data:   MRI lumbar spine: Acute appearing L3 Schmorl's node with substantial reactive edema within the vertebral body adjacent to the node.  This is the most likely source of the patient's axial back pain.      Total time of this encounter, including non-face-to-face time was 45 minutes.             Jaun Oneil MD  Orthopaedic Spine Surgery  [facility]

## 2021-07-22 NOTE — PROGRESS NOTES
[facility]      OUTPATIENT PHYSICAL THERAPY EVALUATION  PLAN OF TREATMENT FOR OUTPATIENT REHABILITATION  (COMPLETE FOR INITIAL CLAIMS ONLY)  Patient's Last Name, First Name, M.I.  YOB: 1975  Jaun Lobo                        Provider's Name  [facility] Medical Record No.  [id number]                               Onset Date:  07/21/21   Start of Care Date:  07/22/21      Type:     _X_PT   ___OT   ___SLP Medical Diagnosis:  intractable low back pain                        PT Diagnosis:  impaired mobility   Visits from SOC:  1   _________________________________________________________________________________  Plan of Treatment/Functional Goals    Planned Interventions: bed mobility training, postural re-education, patient/family education, transfer training     Goals: See Physical Therapy Goals on Care Plan in [facility] electronic health record.    Therapy Frequency: One time eval and treatment only  Predicted Duration of Therapy Intervention: 1 day  _________________________________________________________________________________    I CERTIFY THE NEED FOR THESE SERVICES FURNISHED UNDER        THIS PLAN OF TREATMENT AND WHILE UNDER MY CARE     (Physician co-signature of this document indicates review and certification of the therapy plan).                Certification date from: 07/22/21, Certification date to: 07/22/21    Referring Physician: Aden Trimble MD            Initial Assessment        See Physical Therapy evaluation dated 07/22/21 in Epic electronic health record.

## 2021-07-22 NOTE — PLAN OF CARE
Observation goals PRIOR TO DISCHARGE       Comments:     -Diagnostic tests and consults completed and resulted-Not met     -Vital signs normal or at patient baseline -Met    -Tolerating oral intake to maintain hydration -Met    Nurse to notify provider when observation goals have been met and patient is ready for discharge.      Pt is A&OX4,VSS on RA,asymptomatic covid result pending.Reported severe pain but do not want anything as he believed it's causing his urinary retention,voided 800cc around 5am, PVR was 536cc, pt wants to keep trying to void more, was unable to void more, rechecked bladder scan was 566, order obtained for S/C, attempted s/c once was unsuccessful as pt was very nervous and rigid, writer wanted to try with and coude but pt was not willing to,rather want to keep trying to void, at change of shift, the COLT reported pt said he went to the shower and voided, incoming shift will do another bladder scan and go from there.PIV is s/l,Up with SBA.Spine surgery/PT/CC consults pending.

## 2021-07-22 NOTE — PROGRESS NOTES
"S:  We received an order for a corset brace by Carmen Romo DO.  DX (not specified)  O:  I met with the patient in room OU 24-01.  The patient is up out of bed and alert.  I donned a 36-48\" LSO corset on the patient and the fit is adequate.    There is another order in EPIC placed on 7/20/2021 with a DX Chronic left-sided low back pain without sciatica [M54.5, G89.29. for a corset type brace.    A:  The fit of the corset is adequate.  The corset is providing compression that will reduce pain.   The corset will provide mild support and act as a reminder to not bend the lumbar spine.  I showed the patient how to alex the brace and he seems to understand how to alex the brace.  I gave the patient the wear and care paper that comes with the brace.    P:  The patient mentioned he has an appointment on the 28th for this same type of brace [facility] and is wondering if we can cancel that appointment for him because we provided the corset today.  I logged into our [facility] system and canceled his appointment.  G:  Fit lumbar corset, provide mild support, compression to reduce pain.  Neil Copeland CO LO  "

## 2021-07-23 VITALS
WEIGHT: 195 LBS | HEIGHT: 73 IN | SYSTOLIC BLOOD PRESSURE: 127 MMHG | OXYGEN SATURATION: 98 % | BODY MASS INDEX: 25.84 KG/M2 | HEART RATE: 62 BPM | TEMPERATURE: 95.7 F | RESPIRATION RATE: 17 BRPM | DIASTOLIC BLOOD PRESSURE: 69 MMHG

## 2021-07-23 PROCEDURE — 250N000013 HC RX MED GY IP 250 OP 250 PS 637: Performed by: HOSPITALIST

## 2021-07-23 PROCEDURE — 99217 PR OBSERVATION CARE DISCHARGE: CPT | Performed by: INTERNAL MEDICINE

## 2021-07-23 PROCEDURE — G0378 HOSPITAL OBSERVATION PER HR: HCPCS

## 2021-07-23 RX ORDER — CYCLOBENZAPRINE HCL 10 MG
10 TABLET ORAL EVERY 8 HOURS PRN
Qty: 25 TABLET | Refills: 0 | Status: SHIPPED | OUTPATIENT
Start: 2021-07-23 | End: 2022-02-22

## 2021-07-23 RX ORDER — ACETAMINOPHEN 325 MG/1
TABLET ORAL
Qty: 60 TABLET | Refills: 0 | Status: SHIPPED | OUTPATIENT
Start: 2021-07-23 | End: 2023-04-27

## 2021-07-23 RX ORDER — POLYETHYLENE GLYCOL 3350 17 G/17G
17 POWDER, FOR SOLUTION ORAL DAILY PRN
Qty: 510 G | Refills: 0 | Status: SHIPPED | OUTPATIENT
Start: 2021-07-23 | End: 2022-02-22

## 2021-07-23 RX ORDER — TRAMADOL HYDROCHLORIDE 50 MG/1
50 TABLET ORAL EVERY 6 HOURS PRN
Qty: 30 TABLET | Refills: 0 | Status: SHIPPED | OUTPATIENT
Start: 2021-07-23 | End: 2021-08-27

## 2021-07-23 RX ORDER — AMOXICILLIN 250 MG
2 CAPSULE ORAL 2 TIMES DAILY
Qty: 60 TABLET | Refills: 0 | Status: SHIPPED | OUTPATIENT
Start: 2021-07-23 | End: 2022-02-22

## 2021-07-23 RX ORDER — HYDROXYZINE PAMOATE 25 MG/1
25 CAPSULE ORAL 4 TIMES DAILY PRN
Qty: 30 CAPSULE | Refills: 0 | Status: SHIPPED | OUTPATIENT
Start: 2021-07-23 | End: 2021-08-27

## 2021-07-23 RX ORDER — MELOXICAM 7.5 MG/1
7.5 TABLET ORAL DAILY
Qty: 30 TABLET | Refills: 0 | Status: SHIPPED | OUTPATIENT
Start: 2021-07-24 | End: 2021-08-27

## 2021-07-23 RX ADMIN — TRAMADOL HYDROCHLORIDE 50 MG: 50 TABLET, FILM COATED ORAL at 05:55

## 2021-07-23 RX ADMIN — ACETAMINOPHEN 975 MG: 325 TABLET, FILM COATED ORAL at 05:50

## 2021-07-23 RX ADMIN — MELOXICAM 7.5 MG: 7.5 TABLET ORAL at 08:10

## 2021-07-23 RX ADMIN — SENNOSIDES AND DOCUSATE SODIUM 2 TABLET: 8.6; 5 TABLET ORAL at 08:10

## 2021-07-23 RX ADMIN — GABAPENTIN 300 MG: 300 CAPSULE ORAL at 08:10

## 2021-07-23 NOTE — PLAN OF CARE
PRIMARY DIAGNOSIS: ACUTE PAIN  OUTPATIENT/OBSERVATION GOALS TO BE MET BEFORE DISCHARGE:  1. Pain Status: Improved-controlled with oral pain medications.    2. Return to near baseline physical activity: Yes    3. Cleared for discharge by consultants (if involved): Yes    Discharge Planner Nurse   Safe discharge environment identified: Yes  Barriers to discharge: No       Entered by: Cristy Caraballo 07/23/2021 9:57 AM     Please review provider order for any additional goals.   Nurse to notify provider when observation goals have been met and patient is ready for discharge.

## 2021-07-23 NOTE — DISCHARGE SUMMARY
[facility]  Hospitalist Discharge Summary      Date of Admission:  7/21/2021  Date of Discharge:  7/23/2021 11:50 AM  Discharging Provider: Aurora De Jesus MD      Discharge Diagnoses   Acute on chronic back pain   Acute urinary retention secondary to pain- resolved.  Constipation- improved    Follow-ups Needed After Discharge   Follow up with Ortho spine as scheduled.    Unresulted Labs Ordered in the Past 30 Days of this Admission     No orders found from 6/21/2021 to 7/22/2021.      None  Discharge Disposition   Discharged to home  Condition at discharge: Good      Hospital Course    Jaun Lobo is a 46 year old male who was admitted on 7/21/2021 with back pain; for a detailed HPI- please refer to H&P done by Dr Aden Trimble on 07/21/2021.    Acute on chronic back pain   Patient reports ongoing back pain for a few weeks now, evaluated multiple times and is also following with [facility] and [facility]opedic [facility]; no numbness in his lower extremities  -  Had MRI on 7/15 1. Resolution of large right central L5-S1 disc extrusion since 2013. No high-grade spinal canal or neural foraminal narrowing. Mild  progression in mild to moderate neural foraminal narrowing in the lower lumbar spine. 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 Suspect additional recent posterior inferior endplate fracture/developing Schmorl's node deformity at L2.  Had been on steroid trial, completed course 7/20.  PTA on scheduled gabapentin, and oxycodone prn Had seen his spine surgeon on 7/20, recommended corset brace.  Reports having episodes of painful spasms where he is not able to stand, associated urinary retention since morning of admission and no BM since 2 days prior to admit.   - ortho spine eval, non surgical management recommended; may take several weeks to improve and months to resolve  -  started on scheduled Tylenol, Mobic 7.5 mg po daily, Flexeril prn and Tramadol prn; continued Gabapentin 100 mg po BID  - also continued PTA Hydroxyzine which can help with the pain and pain-related anxiety.  - appreciate orthotics--corset brace ordered for patient  - PT evaluation done- recommended outpatient PT  - with above mentioned management- the patient reported improvement of the pain  -----Recommend follow-up with one of the nonoperative spine providers within [facility] orth[facility].  Can call [phone] to set up an appointment.  - activity as tolerated, avoid heavy lifting, twisting, excessive bending.    Acute urinary retention secondary to pain- resolved  Acute constipation secondary to pain, narcotic use, immobility.  - started on Senna BID and Miralax daily prn      Elevated blood pressures likely secondary to pain.  History of hypertension, not on any meds PTA  In ED blood pressure is 151/85.  - BP controlled with SBP in 120-130's once pain was better controlled     Insomnia.  Continue PTA Restoril.    Consultations This Hospital Stay   CARE MANAGEMENT / SOCIAL WORK IP CONSULT  PHYSICAL THERAPY ADULT IP CONSULT  SPINE SURGERY ADULT IP CONSULT  ORTHOSIS BRACE IP CONSULT    Code Status   Full Code    Time Spent on this Encounter   I, Aurora De Jesus MD, personally saw the patient today and spent less than or equal to 30 minutes discharging this patient.       Aurora De Jesus MD  [facility]  [address] [place] [id number]  Phone: [phone]  ______________________________________________________________________    Physical Exam   Vital Signs: Temp: (!) 95.7  F (35.4  C) Temp src: Oral BP: 127/69 Pulse: 62   Resp: 17 SpO2: 98 % O2 Device: None (Room air)    Weight: 195 lbs 0 oz     Constitutional: Awake, alert, cooperative, no apparent distress, very pleasant  Respiratory: Clear to auscultation bilaterally, no crackles or  wheezing  Cardiovascular: Regular rate and rhythm, normal S1 and S2, and no murmur noted  GI: Normal bowel sounds, soft, non-distended, non-tender  Skin/Integumen: No rashes, no cyanosis, no edema  Neuro: AAOX3, no FNDs  Psych- normal mood, normal affect          Primary Care Physician   Marcial Sotomayor    Discharge Orders      Physical Therapy Referral      Reason for your hospital stay    Back pain     Activity    Your activity upon discharge: activity as tolerated with brace in place; Avoid heavy lifting, excessive bending, twisting.  - No driving while taking pain medications.     Diet    Follow this diet upon discharge: Orders Placed This Encounter      Regular Diet Adult       Significant Results and Procedures   Most Recent 3 CBC's:Recent Labs   Lab Test 07/21/21  1425 08/08/17  0829 06/25/17  0019   WBC 6.7 4.7 6.0   HGB 15.9 15.9 15.0   MCV 88 89 88    231 210     Most Recent 3 BMP's:Recent Labs   Lab Test 07/21/21  1425 12/04/20  1214 05/21/19  1110    138 138   POTASSIUM 4.1 4.2 4.5   CHLORIDE 103 106 103   CO2 27 28 28   BUN 16 17 11   CR 0.90 0.95 0.86   ANIONGAP 4 4 7   NAZARIO 9.1 8.8 9.0   * 96 84     Most Recent 2 LFT's:Recent Labs   Lab Test 07/21/21  1425 12/04/20  1214   AST 21 20   ALT 27 25   ALKPHOS 56 57   BILITOTAL 1.1 1.3     Most Recent 3 INR's:Recent Labs   Lab Test 08/08/17  0829   INR 1.02   ,   Results for orders placed or performed in visit on 07/15/21   MR Lumbar Spine w/o Contrast    Narrative    MR LUMBAR SPINE W/O CONTRAST 7/15/2021 2:52 PM    Provided History: Low back pain, > 6 wks; Chronic left-sided low back  pain without sciatica; Chronic left-sided low back pain without  sciatica    ICD-10: Chronic left-sided low back pain without sciatica; Chronic  left-sided low back pain without sciatica    Comparison: MRI 7/30/2013    Technique: Sagittal T1-weighted, sagittal STIR, 3D volumetric axial  and sagittal reconstructed T2-weighted images of the lumbar spine  were  obtained without intravenous contrast.     Findings:  There are 7 cervical, 12 thoracic and 5 lumbar type  vertebrae.  There is loss of normal lumbar lordosis. Degenerative  marrow signals at the adjacent endplates of L2 and L3 vertebral body  marrow edema and Schmorl's node. The tip of the conus medullaris is at  L1.  Normal lumbar vertebral alignment.  There is mild disc height  narrowing L5-S1.      On a level by level basis:    T12-L1: No spinal canal or neuroforaminal stenosis.    L1-2: No spinal canal or neuroforaminal stenosis.    L2-3: Circumferential disc bulge. This is indenting the descending  left L3 nerve root in the lateral recess. Mild neural foraminal  stenosis. No spinal canal narrowing    L3-4: Circumferential disc bulge with annular discal tear. Moderate  left and mild right neural foraminal narrowing. No spinal canal  stenosis.    L4-5: Circumferential disc bulge causing moderate left and mild right  neural foraminal narrowing. No spinal canal stenosis.    L5-S1: Circumferential disc bulge with left foraminal extension  causing mild left greater than right neural foraminal narrowing and  impingement over the exiting left L5 nerve root. Mild right neural  foraminal narrowing. No spinal canal stenosis.    Paraspinous tissues are within normal limits.      Impression    Impression:  1. Resolution of large right central L5-S1 disc extrusion since 2013.  No high-grade spinal canal or neural foraminal narrowing. Mild  progression in mild to moderate neural foraminal narrowing in the  lower lumbar spine.  2. Progression of Schmorl's node deformity in the upper endplate of L3  since 2013, with surrounding marrow edema suggesting that this is  recent. Suspect additional recent posterior inferior endplate  fracture/developing Schmorl's node deformity at L2.    I have personally reviewed the examination and initial interpretation  and I agree with the findings.    TERRY MELLO MD         SYSTEM ID:   [id number]       Discharge Medications   Current Discharge Medication List      START taking these medications    Details   acetaminophen (TYLENOL) 325 MG tablet Take 975 mg (3 tablets) by mouth 3 times daily for 3 days, then take 650 mg (2 tablets) by mouth every 6 hours as needed for mild- moderate pain.  Qty: 60 tablet, Refills: 0    Associated Diagnoses: Intractable back pain      cyclobenzaprine (FLEXERIL) 10 MG tablet Take 1 tablet (10 mg) by mouth every 8 hours as needed for muscle spasms  Qty: 25 tablet, Refills: 0    Comments: Future refills by PCP Dr. Marcial Sotomayor with phone number [phone].  Associated Diagnoses: Intractable back pain      meloxicam (MOBIC) 7.5 MG tablet Take 1 tablet (7.5 mg) by mouth daily  Qty: 30 tablet, Refills: 0    Comments: Future refills by PCP Dr. Marcial Sotomayor with phone number [phone].  Associated Diagnoses: Intractable back pain      polyethylene glycol (MIRALAX) 17 GM/Dose powder Take 17 g by mouth daily as needed for constipation  Qty: 510 g, Refills: 0    Comments: Future refills by PCP Dr. Marcial Sotomayor with phone number [phone].  Associated Diagnoses: Constipation, unspecified constipation type      senna-docusate (SENOKOT-S/PERICOLACE) 8.6-50 MG tablet Take 2 tablets by mouth 2 times daily  Qty: 60 tablet, Refills: 0    Comments: Future refills by PCP Dr. Marcial Sotomayor with phone number [phone].  Associated Diagnoses: Constipation, unspecified constipation type      traMADol (ULTRAM) 50 MG tablet Take 1 tablet (50 mg) by mouth every 6 hours as needed for moderate pain  Qty: 30 tablet, Refills: 0    Comments: Future refills by PCP Dr. Marcial Sotomayor with phone number [phone].  Associated Diagnoses: Intractable back pain         CONTINUE these medications which have CHANGED    Details   hydrOXYzine (VISTARIL) 25 MG capsule Take 1 capsule (25 mg) by mouth 4 times daily as needed for anxiety  Qty: 30 capsule, Refills: 0    Comments: Future refills by PCP  Dr. Marcial Sotomayor with phone number [phone].  Associated Diagnoses: Anxiety state         CONTINUE these medications which have NOT CHANGED    Details   albuterol (PROAIR HFA, PROVENTIL HFA, VENTOLIN HFA) 108 (90 BASE) MCG/ACT inhaler Inhale 2 puffs into the lungs every 4 hours as needed for shortness of breath / dyspnea or wheezing       gabapentin (NEURONTIN) 300 MG capsule Take 1 capsule (300 mg) by mouth 3 times daily  Qty: 90 capsule, Refills: 1    Associated Diagnoses: Acute bilateral low back pain without sciatica      melatonin 3 MG tablet Take 3 mg by mouth nightly as needed.    Associated Diagnoses: Insomnia      multivitamin w/minerals (THERA-VIT-M) tablet Take 1 tablet by mouth daily      temazepam (RESTORIL) 15 MG capsule Take 1/2 to 1 tablet PO at bedtime as needed for sleep  Qty: 30 capsule, Refills: 1    Comments: This request is for a new prescription for a controlled substance as required by Federal/State law..  Associated Diagnoses: Insomnia, unspecified type         STOP taking these medications       ibuprofen (ADVIL/MOTRIN) 200 MG tablet Comments:   Reason for Stopping:         oxyCODONE (ROXICODONE) 5 MG tablet Comments:   Reason for Stopping:             Allergies   Allergies   Allergen Reactions     Antabuse [Disulfiram] Other (See Comments)     hepatitis     Campral [Acamprosate] Other (See Comments)     Mood changes     Animal Dander      Dust Mite Extract      No Known Drug Allergies      Ragweeds      goldenrod     Seasonal Allergies

## 2021-07-23 NOTE — PLAN OF CARE
Pt A&O X4. VSS on RA. Wearing corset back brace. Up SBA/IND. Regular diet. PIV SL. Ortho signed off. PT rec OP PT. PRN tramadol given @2039 for pain. PRN hydroxyzine given @2144 for anxiety. Possible discharge today. Pt would like to discharge early  today. Continue to monitor.     Observation goals PRIOR TO DISCHARGE    Comments: -diagnostic tests and consults completed and resulted -Partially met  -vital signs normal or at patient baseline -Met  -tolerating oral intake to maintain hydration -Met  Nurse to notify provider when observation goals have been met and patient is ready for discharge.

## 2021-07-23 NOTE — PROGRESS NOTES
Observation goals PRIOR TO DISCHARGE    Comments: -diagnostic tests and consults completed and resulted -Partially met  -vital signs normal or at patient baseline -Met  -tolerating oral intake to maintain hydration -Met  Nurse to notify provider when observation goals have been met and patient is ready for discharge.

## 2021-07-23 NOTE — PLAN OF CARE
A&Ox4. VSS on RA. Up independently. Pain managed with tramadol, tylenol, and gabapentin. Voiding and stooling. Senna given as scheduled. Regular diet well tolerated. Denies numbness/tingling. IV removed. AVS reviewed at the bedside; understanding verbalized.

## 2021-07-25 ENCOUNTER — MYC MEDICAL ADVICE (OUTPATIENT)
Dept: FAMILY MEDICINE | Facility: CLINIC | Age: 46
End: 2021-07-25

## 2021-07-26 ENCOUNTER — PATIENT OUTREACH (OUTPATIENT)
Dept: CARE COORDINATION | Facility: CLINIC | Age: 46
End: 2021-07-26

## 2021-07-26 DIAGNOSIS — Z71.89 OTHER SPECIFIED COUNSELING: ICD-10-CM

## 2021-07-26 DIAGNOSIS — M54.9 BACK PAIN: Primary | ICD-10-CM

## 2021-07-26 NOTE — PROGRESS NOTES
Clinic Care Coordination Contact  [facility]/Voicemail       The first call the CHW was informed that it was a bad time to talk and was asked to call back in five minutes. The CHW called back but had to leave a voicemail.    Clinical Data: Care Coordinator Outreach  Outreach attempted x 1.  Left message on patient's voicemail with call back information and requested return call.  Plan: Care Coordinator will try to reach patient again in 1-2 business days.

## 2021-07-27 ENCOUNTER — PRE VISIT (OUTPATIENT)
Dept: ORTHOPEDICS | Facility: CLINIC | Age: 46
End: 2021-07-27

## 2021-07-27 ENCOUNTER — OFFICE VISIT (OUTPATIENT)
Dept: ORTHOPEDICS | Facility: CLINIC | Age: 46
End: 2021-07-27
Payer: COMMERCIAL

## 2021-07-27 ENCOUNTER — ANCILLARY PROCEDURE (OUTPATIENT)
Dept: GENERAL RADIOLOGY | Facility: CLINIC | Age: 46
End: 2021-07-27
Attending: ORTHOPAEDIC SURGERY
Payer: COMMERCIAL

## 2021-07-27 ENCOUNTER — TELEPHONE (OUTPATIENT)
Dept: ORTHOPEDICS | Facility: CLINIC | Age: 46
End: 2021-07-27

## 2021-07-27 VITALS — BODY MASS INDEX: 25.67 KG/M2 | HEIGHT: 74 IN | WEIGHT: 200 LBS

## 2021-07-27 DIAGNOSIS — M54.50 ACUTE BILATERAL LOW BACK PAIN WITHOUT SCIATICA: ICD-10-CM

## 2021-07-27 DIAGNOSIS — F10.20 UNCOMPLICATED ALCOHOL DEPENDENCE (H): Primary | ICD-10-CM

## 2021-07-27 PROCEDURE — 99204 OFFICE O/P NEW MOD 45 MIN: CPT | Mod: GC | Performed by: ORTHOPAEDIC SURGERY

## 2021-07-27 PROCEDURE — 72100 X-RAY EXAM L-S SPINE 2/3 VWS: CPT | Mod: GC | Performed by: RADIOLOGY

## 2021-07-27 RX ORDER — TRAMADOL HYDROCHLORIDE 50 MG/1
50 TABLET ORAL DAILY PRN
Qty: 20 TABLET | Refills: 0 | Status: SHIPPED | OUTPATIENT
Start: 2021-07-27 | End: 2021-08-27

## 2021-07-27 RX ORDER — MELOXICAM 7.5 MG/1
7.5 TABLET ORAL DAILY
Qty: 20 TABLET | Refills: 0 | Status: SHIPPED | OUTPATIENT
Start: 2021-07-27 | End: 2021-08-27

## 2021-07-27 ASSESSMENT — MIFFLIN-ST. JEOR: SCORE: 1845.94

## 2021-07-27 NOTE — TELEPHONE ENCOUNTER
Called and spoke to pt, I informed him that I changed the note for him. He requested it be sent to his email. I verified email before sending.

## 2021-07-27 NOTE — NURSING NOTE
"Reason For Visit:   Chief Complaint   Patient presents with     Consult     back pain, lumbar        Primary MD: Marcial Sotomayor  Ref. MD: Dr. Ferreira    ?  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  Work status?  Full time.    Date of injury: No   Type of injury: No.    Date of surgery: No  Type of surgery: No.    Smoker: No  Request smoking cessation information: No    Ht 1.87 m (6' 1.62\")   Wt 90.7 kg (200 lb)   BMI 25.94 kg/m      Pain Assessment  Patient Currently in Pain: Yes  0-10 Pain Scale: 5  Primary Pain Location: Back    Oswestry (NEPTALI) Questionnaire    OSWESTRY DISABILITY INDEX 7/27/2021   Count 9   Sum 37   Oswestry Score (%) 82.22   Some recent data might be hidden        Visual Analog Pain Scale  Back Pain Scale 0-10: 5  Right leg pain: 0  Left leg pain: 0  Neck Pain Scale 0-10: 0  Right arm pain: 0  Left arm pain: 0    Promis 10 Assessment    PROMIS 10 12/11/2017   In general, would you say your health is: 4   In general, would you say your quality of life is: 4   In general, how would you rate your physical health? 4   In general, how would you rate your mental health, including your mood and your ability to think? 4   In general, how would you rate your satisfaction with your social activities and relationships? 4   In general, please rate how well you carry out your usual social activities and roles. (This includes activities at home, at work and in your community, and responsibilities as a parent, child, spouse, employee, friend, etc.) 3   To what extent are you able to carry out your everyday physical activities such as walking, climbing stairs, carrying groceries, or moving a chair? 5   In the past 7 days, how often have you been bothered by emotional problems such as feeling anxious, depressed, or irritable? 3   In the past 7 days, how would you rate your fatigue on average? 1   In the past 7 days, how would you rate your pain on average, where 0 means no pain, and 10 " means worst imaginable pain? 2   Global Mental Health Score 15   Global Physical Health Score 18   PROMIS TOTAL - SUBSCORES 33   Some recent data might be hidden                Analisa Jimenez LPN

## 2021-07-27 NOTE — LETTER
Return to Work  2021     Seen today: yes    Patient:  Jaun Lobo  :   1975  MRN:     [id number]  Physician: VARINDER MYERS    Jaun Lobo may return to work on Date: .      The next clinic appointment is scheduled in a month    Patient limitations: No repetitive bending or twisting motion. Lifting restrictions is 15lbs. Patient should be evaluated for a sit to stand desk. Lifting restriction will be reevaluated for the next visit.      Electronically signed by Varinder Myers MD

## 2021-07-27 NOTE — PROGRESS NOTES
Clinic Care Coordination Contact  [facility]/Voicemail       Clinical Data: Care Coordinator Outreach  Outreach attempted x 2.  Left message on patient's voicemail with call back information and requested return call.  Plan:  Care Coordinator will do no further outreaches at this time.

## 2021-07-27 NOTE — LETTER
7/27/2021         RE: Jaun Lobo  [address]  [place] [address]        Dear Colleague,    Thank you for referring your patient, Jaun Lobo, to the [facility] [place]. Please see a copy of my visit note below.    Spine Surgery Consultation    REFERRING PHYSICIAN: No ref. provider found   PRIMARY CARE PHYSICIAN: Marcial Sotomayor           Chief Complaint:   Consult (back pain, lumbar )      History of Present Illness:  Symptom Profile Including: location of symptoms, onset, severity, exacerbating/alleviating factors, previous treatments:        Jaun Lobo is a 46 year old male who presents for evaluation of a recent exacerbation of chronic back pain.  He describes back pain that is longstanding, however has not been present since 2013.  In 2013 he was diagnosed with spinal stenosis and lumbar disc herniations, which for which she did physical therapy, lumbar injections, and oral medications with ibuprofen.  He also started doing twice daily yoga at home. For this recent episode he did denies any precipitating event, has had no accident, trauma, or any fall that might of preceded the symptom onset.  He feels that he is completely disabled due to the excruciating pain.    He described resolution of his symptoms until 1 month ago, and since that time his had a downward trajectory of ongoing back pain.  2 weeks ago he presented to urgent care, and then again to an emergency room twice, and was seeking admission for pain management.  He was admitted his pain was managed and he was discharged.    The pain is located primarily in the low back bilaterally and radiates to the bilateral hips, locates to the gluteal region and over the trochanters bilaterally.  There is no radiation of the pain down into the thighs or down past the knees into the legs or feet.  He denies any numbness or tingling.  There is no focal weakness that he is noted, however reports that sometimes his  "legs will buckle during a sharp flare of pain.  He denies any urinary or bowel incontinence, but endorses excruciating pain with sitting on the toilet or straining at bowel movement.    He describes \"sympathetic muscle spasms \"where all the muscles in his body will spasm and he finds it hard to breathe due to diffuse overwhelming pain.    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    Previous treatments  NSAIDS/tylenol: ibuprofen  Other medications: prior to 2013 several medrol dosepaks (none for this episode); gabapentin (no relief); tramadol (helps some)  Brace therapy: corset brace  Injections: none  Activity modification: yoga, positional changes           Past Medical History:     Past Medical History:   Diagnosis Date     Alcohol dependence (H)      Allergic rhinitis, cause unspecified     allergy shots as kid     Anxiety state, unspecified     on [facility]     Back disorder     degenerative      DDD (degenerative disc disease)      Esophagitis      Lichen planus   2007     MEDICAL HISTORY OF - 3/10/10    ulcerative esophagitis     Mild intermittent asthma     rare use of albuteral     Tobacco use disorder     quit 2002     Unspecified hemorrhoids without mention of complication             Past Surgical History:     Past Surgical History:   Procedure Laterality Date     CL AFF SURGICAL PATHOLOGY       ESOPHAGOSCOPY, GASTROSCOPY, DUODENOSCOPY (EGD), COMBINED  5/9/2014    Procedure: COMBINED ESOPHAGOSCOPY, GASTROSCOPY, DUODENOSCOPY (EGD), BIOPSY SINGLE OR MULTIPLE;  Surgeon: Yanely Macias MD;  Location: [facility]     wisdom teeth       [facility] NONSPECIFIC PROCEDURE      nasal fx; left clavic fx     Z NONSPECIFIC PROCEDURE  12/04    normal colonoscopy; rpt age 49            Social History:     Social History     Tobacco Use     Smoking status: Former Smoker     Smokeless tobacco: Never Used   Substance Use Topics     Alcohol use: No     Alcohol/week: 16.7 standard drinks     " Types: 20 Cans of beer per week     Comment: 40 drinks a week , quit drinking 6 weeks ago             Family History:     Family History   Problem Relation Age of Onset     Cerebrovascular Disease Maternal Grandfather         had strokes in his 80's     Gastrointestinal Disease Mother         ulcerative colitis     Anxiety Disorder Mother      Glaucoma Paternal Grandmother      Macular Degeneration Paternal Grandmother      Cancer Other         esophageal cancer, cousin     C.A.D. No family hx of      Diabetes No family hx of      Hypertension No family hx of      Breast Cancer No family hx of      Cancer - colorectal No family hx of      Prostate Cancer No family hx of      Unknown/Adopted No family hx of      Depression No family hx of      Schizophrenia No family hx of      Bipolar Disorder No family hx of      Suicide No family hx of      Substance Abuse No family hx of      Dementia No family hx of      [place]ow[place] Disease No family hx of      Parkinsonism No family hx of      Autism Spectrum Disorder No family hx of      Intellectual Disability (Mental Retardation) No family hx of      Mental Illness No family hx of             Allergies:     Allergies   Allergen Reactions     Antabuse [Disulfiram] Other (See Comments)     hepatitis     Campral [Acamprosate] Other (See Comments)     Mood changes     Animal Dander      Dust Mite Extract      No Known Drug Allergies      Ragweeds      goldenrod     Seasonal Allergies             Medications:     Current Outpatient Medications   Medication     acetaminophen (TYLENOL) 325 MG tablet     albuterol (PROAIR HFA, PROVENTIL HFA, VENTOLIN HFA) 108 (90 BASE) MCG/ACT inhaler     cyclobenzaprine (FLEXERIL) 10 MG tablet     gabapentin (NEURONTIN) 300 MG capsule     hydrOXYzine (VISTARIL) 25 MG capsule     melatonin 3 MG tablet     meloxicam (MOBIC) 7.5 MG tablet     meloxicam (MOBIC) 7.5 MG tablet     multivitamin w/minerals (THERA-VIT-M) tablet     polyethylene glycol  "(MIRALAX) 17 GM/Dose powder     senna-docusate (SENOKOT-S/PERICOLACE) 8.6-50 MG tablet     temazepam (RESTORIL) 15 MG capsule     traMADol (ULTRAM) 50 MG tablet     traMADol (ULTRAM) 50 MG tablet     No current facility-administered medications for this visit.             Review of Systems:     A 10 point ROS was performed and reviewed. Specific responses to these questions are noted at the end of the document.         Physical Exam:   Vitals: Ht 1.87 m (6' 1.62\")   Wt 90.7 kg (200 lb)   BMI 25.94 kg/m    Constitutional: awake, alert, cooperative, no apparent distress, appears stated age.    Eyes: The sclera are white.  Ears, Nose, Throat: The trachea is midline.  Psychiatric: The patient has a normal affect.  Respiratory: breathing non-labored  Cardiovascular: The extremities are warm and perfused.  Skin: no obvious rashes or lesions.  Musculoskeletal, Neurologic, and Spine:       Lumbar Spine:    Appearance - No gross stepoffs or deformities, no surgical scars, no lesions    Slow, antalgic gait  No scoliotic curves grossly evident  Patient stands with a neutral standing sagittal balance.      Motor -     L2-3: Hip flexion R 4*/5  And L 4*/5 strength          L3/4:  Knee extension R 4*/5 and L 4*/5 strength         L4/5:  Foot dorsiflexion R 5/5 L 5/5 and       EHL dorsiflexion R 5/5 L 5/5 strength         S1:  Plantarflexion  R 5/5 and L 5/5 strength    Sensation: intact to light touch L3-S1 distribution BLE  *Giveaway weakness consistent with weakness due to pain exacerbation        Straight leg raise negative bilaterally      Neurologic:      REFLEXES Right Left   Patella 2+ 2+   Ankle jerk 2+ 2+   Clonus 0 beats 0 beats              Imaging:     Lumbar MRI 7/15/21:  Impression:  1. Resolution of large right central L5-S1 disc extrusion since 2013.  No high-grade spinal canal or neural foraminal narrowing. Mild  progression in mild to moderate neural foraminal narrowing in the  lower lumbar spine.  2. " Progression of Schmorl's node deformity in the upper endplate of L3  since 2013, with surrounding marrow edema suggesting that this is  recent. Suspect additional recent posterior inferior endplate  fracture/developing Schmorl's node deformity at L2.             Assessment and Plan:   Assessment:  46 year old male with a history of chronic back pain now with a acute to subacute exacerbation of his back pain over the past month.  Lumbar MRI has shown superior endplate fracture at L2 with herniation of the disc into that space with significant edema.  The edema suggest that is a new phenomenon, and indeed it is not noted on prior lumbar MRI in 2013.  Symptoms of primarily axial back pain the severe nature is consistent with this radiographic finding.     We discussed with the patient options for ongoing management.  We discussed that one option would be to wait 8 to 12 weeks to allow the bony pathology and fracture to heal, with ongoing conservative management in the form of oral medications muscle relaxants and corset bracing for comfort as he is doing.  We would recommend 6 weeks of rest to allow the fracture to heal.  Additionally we discussed that another option would be surgery to stabilize and limit motion across the area with fracture and herniation of the disc.  We described that micromotion in this area would be a likely source of his back pain.  The surgery described would be a minimally invasive surgery in the form of a XLIF with a cage placement and the minimally invasive percutaneous posterior instrumentation.    The risks, benefits, and alternatives to surgery were discussed with the patient.  His questions were answered.  At this time he describes that his symptoms are significantly disabling to him and disruptive to his work, his family life, and his normally quite active daily life.  He described that he would like to have surgery as a possible option in his toolbox to consider.  He described that he  would like to consider these options more thoroughly, and discussed with his family and will be in touch with us once he makes his decision.    We will plan to have him follow-up in 1 month by either phone or in person to discuss his progress, his symptoms, and rethink the possibility of surgery.  We let him know that he can call back to clinic if his symptoms progress, become even further disabling, or is additional questions.     Plan:  1. F/u in clinic by phone or in person in 1 month    Patient seen and discussed with Dr. Burleson, who is in agreement with the plan noted above.    Dane Bejarano MD  Orthopedic Surgery PGY-1    Attending MD (Dr. Cody Burleson) :  I reviewed and verified the history and physical exam of the patient and discussed the patient's management with the other clinical providers involved in this patient's care including any involved residents or physicians assistants. I reviewed the above note and agree with the documented findings and plan of care, which were communicated to the patient.      Cody Burleson MD

## 2021-07-27 NOTE — TELEPHONE ENCOUNTER
" [facility] Call Center    Phone Message    May a detailed message be left on voicemail: yes     Reason for Call: Other: Pt's  would like the letter to have 2 modifications    1. Second paragraph should say, \"Adjustable desk and chair\" instead of just adjustable desk.  2. Lifting restriction at 5 lbs instead of 15 lbs.    Please email letter or make available via [employer].    Action Taken: Message routed to:  [facility] ortho    Travel Screening: Not Applicable                                                                      "

## 2021-07-27 NOTE — PROGRESS NOTES
"Spine Surgery Consultation    REFERRING PHYSICIAN: No ref. provider found   PRIMARY CARE PHYSICIAN: Marcial Sotomayor           Chief Complaint:   Consult (back pain, lumbar )      History of Present Illness:  Symptom Profile Including: location of symptoms, onset, severity, exacerbating/alleviating factors, previous treatments:        Jaun Lobo is a 46 year old male who presents for evaluation of a recent exacerbation of chronic back pain.  He describes back pain that is longstanding, however has not been present since 2013.  In 2013 he was diagnosed with spinal stenosis and lumbar disc herniations, which for which she did physical therapy, lumbar injections, and oral medications with ibuprofen.  He also started doing twice daily yoga at home. For this recent episode he did denies any precipitating event, has had no accident, trauma, or any fall that might of preceded the symptom onset.  He feels that he is completely disabled due to the excruciating pain.    He described resolution of his symptoms until 1 month ago, and since that time his had a downward trajectory of ongoing back pain.  2 weeks ago he presented to urgent care, and then again to an emergency room twice, and was seeking admission for pain management.  He was admitted his pain was managed and he was discharged.    The pain is located primarily in the low back bilaterally and radiates to the bilateral hips, locates to the gluteal region and over the trochanters bilaterally.  There is no radiation of the pain down into the thighs or down past the knees into the legs or feet.  He denies any numbness or tingling.  There is no focal weakness that he is noted, however reports that sometimes his legs will buckle during a sharp flare of pain.  He denies any urinary or bowel incontinence, but endorses excruciating pain with sitting on the toilet or straining at bowel movement.    He describes \"sympathetic muscle spasms \"where all the muscles in his body " will spasm and he finds it hard to breathe due to diffuse overwhelming pain.    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    Previous treatments  NSAIDS/tylenol: ibuprofen  Other medications: prior to 2013 several medrol dosepaks (none for this episode); gabapentin (no relief); tramadol (helps some)  Brace therapy: corset brace  Injections: none  Activity modification: yoga, positional changes           Past Medical History:     Past Medical History:   Diagnosis Date     Alcohol dependence (H)      Allergic rhinitis, cause unspecified     allergy shots as kid     Anxiety state, unspecified     on [facility]     Back disorder     degenerative      DDD (degenerative disc disease)      Esophagitis      Lichen planus   2007     MEDICAL HISTORY OF - 3/10/10    ulcerative esophagitis     Mild intermittent asthma     rare use of albuteral     Tobacco use disorder     quit 2002     Unspecified hemorrhoids without mention of complication             Past Surgical History:     Past Surgical History:   Procedure Laterality Date     CL AFF SURGICAL PATHOLOGY       ESOPHAGOSCOPY, GASTROSCOPY, DUODENOSCOPY (EGD), COMBINED  5/9/2014    Procedure: COMBINED ESOPHAGOSCOPY, GASTROSCOPY, DUODENOSCOPY (EGD), BIOPSY SINGLE OR MULTIPLE;  Surgeon: Yanely Macias MD;  Location: [facility]     wisdom teeth       [facility]C NONSPECIFIC PROCEDURE      nasal fx; left clavic fx     ZZC NONSPECIFIC PROCEDURE  12/04    normal colonoscopy; rpt age 49            Social History:     Social History     Tobacco Use     Smoking status: Former Smoker     Smokeless tobacco: Never Used   Substance Use Topics     Alcohol use: No     Alcohol/week: 16.7 standard drinks     Types: 20 Cans of beer per week     Comment: 40 drinks a week , quit drinking 6 weeks ago             Family History:     Family History   Problem Relation Age of Onset     Cerebrovascular Disease Maternal Grandfather         had strokes in his 80's      Gastrointestinal Disease Mother         ulcerative colitis     Anxiety Disorder Mother      Glaucoma Paternal Grandmother      Macular Degeneration Paternal Grandmother      Cancer Other         esophageal cancer, cousin     C.A.D. No family hx of      Diabetes No family hx of      Hypertension No family hx of      Breast Cancer No family hx of      Cancer - colorectal No family hx of      Prostate Cancer No family hx of      Unknown/Adopted No family hx of      Depression No family hx of      Schizophrenia No family hx of      Bipolar Disorder No family hx of      Suicide No family hx of      Substance Abuse No family hx of      Dementia No family hx of      [place] Disease No family hx of      Parkinsonism No family hx of      Autism Spectrum Disorder No family hx of      Intellectual Disability (Mental Retardation) No family hx of      Mental Illness No family hx of             Allergies:     Allergies   Allergen Reactions     Antabuse [Disulfiram] Other (See Comments)     hepatitis     Campral [Acamprosate] Other (See Comments)     Mood changes     Animal Dander      Dust Mite Extract      No Known Drug Allergies      Ragweeds      goldenrod     Seasonal Allergies             Medications:     Current Outpatient Medications   Medication     acetaminophen (TYLENOL) 325 MG tablet     albuterol (PROAIR HFA, PROVENTIL HFA, VENTOLIN HFA) 108 (90 BASE) MCG/ACT inhaler     cyclobenzaprine (FLEXERIL) 10 MG tablet     gabapentin (NEURONTIN) 300 MG capsule     hydrOXYzine (VISTARIL) 25 MG capsule     melatonin 3 MG tablet     meloxicam (MOBIC) 7.5 MG tablet     meloxicam (MOBIC) 7.5 MG tablet     multivitamin w/minerals (THERA-VIT-M) tablet     polyethylene glycol (MIRALAX) 17 GM/Dose powder     senna-docusate (SENOKOT-S/PERICOLACE) 8.6-50 MG tablet     temazepam (RESTORIL) 15 MG capsule     traMADol (ULTRAM) 50 MG tablet     traMADol (ULTRAM) 50 MG tablet     No current facility-administered medications for this visit.  "            Review of Systems:     A 10 point ROS was performed and reviewed. Specific responses to these questions are noted at the end of the document.         Physical Exam:   Vitals: Ht 1.87 m (6' 1.62\")   Wt 90.7 kg (200 lb)   BMI 25.94 kg/m    Constitutional: awake, alert, cooperative, no apparent distress, appears stated age.    Eyes: The sclera are white.  Ears, Nose, Throat: The trachea is midline.  Psychiatric: The patient has a normal affect.  Respiratory: breathing non-labored  Cardiovascular: The extremities are warm and perfused.  Skin: no obvious rashes or lesions.  Musculoskeletal, Neurologic, and Spine:       Lumbar Spine:    Appearance - No gross stepoffs or deformities, no surgical scars, no lesions    Slow, antalgic gait  No scoliotic curves grossly evident  Patient stands with a neutral standing sagittal balance.      Motor -     L2-3: Hip flexion R 4*/5  And L 4*/5 strength          L3/4:  Knee extension R 4*/5 and L 4*/5 strength         L4/5:  Foot dorsiflexion R 5/5 L 5/5 and       EHL dorsiflexion R 5/5 L 5/5 strength         S1:  Plantarflexion  R 5/5 and L 5/5 strength    Sensation: intact to light touch L3-S1 distribution BLE  *Giveaway weakness consistent with weakness due to pain exacerbation        Straight leg raise negative bilaterally      Neurologic:      REFLEXES Right Left   Patella 2+ 2+   Ankle jerk 2+ 2+   Clonus 0 beats 0 beats              Imaging:     Lumbar MRI 7/15/21:  Impression:  1. Resolution of large right central L5-S1 disc extrusion since 2013.  No high-grade spinal canal or neural foraminal narrowing. Mild  progression in mild to moderate neural foraminal narrowing in the  lower lumbar spine.  2. Progression of Schmorl's node deformity in the upper endplate of L3  since 2013, with surrounding marrow edema suggesting that this is  recent. Suspect additional recent posterior inferior endplate  fracture/developing Schmorl's node deformity at L2.             " Assessment and Plan:   Assessment:  46 year old male with a history of chronic back pain now with a acute to subacute exacerbation of his back pain over the past month.  Lumbar MRI has shown superior endplate fracture at L2 with herniation of the disc into that space with significant edema.  The edema suggest that is a new phenomenon, and indeed it is not noted on prior lumbar MRI in 2013.  Symptoms of primarily axial back pain the severe nature is consistent with this radiographic finding.     We discussed with the patient options for ongoing management.  We discussed that one option would be to wait 8 to 12 weeks to allow the bony pathology and fracture to heal, with ongoing conservative management in the form of oral medications muscle relaxants and corset bracing for comfort as he is doing.  We would recommend 6 weeks of rest to allow the fracture to heal.  Additionally we discussed that another option would be surgery to stabilize and limit motion across the area with fracture and herniation of the disc.  We described that micromotion in this area would be a likely source of his back pain.  The surgery described would be a minimally invasive surgery in the form of a XLIF with a cage placement and the minimally invasive percutaneous posterior instrumentation.    The risks, benefits, and alternatives to surgery were discussed with the patient.  His questions were answered.  At this time he describes that his symptoms are significantly disabling to him and disruptive to his work, his family life, and his normally quite active daily life.  He described that he would like to have surgery as a possible option in his toolbox to consider.  He described that he would like to consider these options more thoroughly, and discussed with his family and will be in touch with us once he makes his decision.    We will plan to have him follow-up in 1 month by either phone or in person to discuss his progress, his symptoms, and  rethink the possibility of surgery.  We let him know that he can call back to clinic if his symptoms progress, become even further disabling, or is additional questions.     Plan:  1. F/u in clinic by phone or in person in 1 month    Patient seen and discussed with Dr. Burleson, who is in agreement with the plan noted above.    Dane Bejarano MD  Orthopedic Surgery PGY-1    Attending MD (Dr. Cody Burleson) :  I reviewed and verified the history and physical exam of the patient and discussed the patient's management with the other clinical providers involved in this patient's care including any involved residents or physicians assistants. I reviewed the above note and agree with the documented findings and plan of care, which were communicated to the patient.      Cody Burleson MD

## 2021-07-29 NOTE — PROGRESS NOTES
Called patient and informed her of  Dr Mcmahon's response. Patient verbalizes understanding.  However she is already taking the 75 mcg daily. She will continue that dose and see how she does.   OBS GOALS    -diagnostic tests and consults completed and resulted: PARTIALLY MET; SW/CC for discharge planning. Plan to discharge home tomorrow.     -vital signs normal or at patient baseline: MET    -tolerating oral intake to maintain hydration: MET

## 2021-08-06 ENCOUNTER — THERAPY VISIT (OUTPATIENT)
Dept: PHYSICAL THERAPY | Facility: CLINIC | Age: 46
End: 2021-08-06
Attending: FAMILY MEDICINE
Payer: COMMERCIAL

## 2021-08-06 DIAGNOSIS — G89.29 CHRONIC BILATERAL LOW BACK PAIN WITHOUT SCIATICA: ICD-10-CM

## 2021-08-06 DIAGNOSIS — M54.50 CHRONIC BILATERAL LOW BACK PAIN WITHOUT SCIATICA: ICD-10-CM

## 2021-08-06 PROCEDURE — 97530 THERAPEUTIC ACTIVITIES: CPT | Mod: GP | Performed by: PHYSICAL THERAPIST

## 2021-08-06 PROCEDURE — 97110 THERAPEUTIC EXERCISES: CPT | Mod: GP | Performed by: PHYSICAL THERAPIST

## 2021-08-17 ENCOUNTER — THERAPY VISIT (OUTPATIENT)
Dept: PHYSICAL THERAPY | Facility: CLINIC | Age: 46
End: 2021-08-17
Payer: COMMERCIAL

## 2021-08-17 DIAGNOSIS — G89.29 CHRONIC BILATERAL LOW BACK PAIN WITHOUT SCIATICA: ICD-10-CM

## 2021-08-17 DIAGNOSIS — M54.50 CHRONIC BILATERAL LOW BACK PAIN WITHOUT SCIATICA: ICD-10-CM

## 2021-08-17 PROCEDURE — 97110 THERAPEUTIC EXERCISES: CPT | Mod: GP | Performed by: PHYSICAL THERAPIST

## 2021-08-17 PROCEDURE — 97530 THERAPEUTIC ACTIVITIES: CPT | Mod: GP | Performed by: PHYSICAL THERAPIST

## 2021-08-27 ENCOUNTER — E-VISIT (OUTPATIENT)
Dept: FAMILY MEDICINE | Facility: CLINIC | Age: 46
End: 2021-08-27

## 2021-08-27 ENCOUNTER — TELEPHONE (OUTPATIENT)
Dept: FAMILY MEDICINE | Facility: CLINIC | Age: 46
End: 2021-08-27

## 2021-08-27 ENCOUNTER — MYC MEDICAL ADVICE (OUTPATIENT)
Dept: FAMILY MEDICINE | Facility: CLINIC | Age: 46
End: 2021-08-27

## 2021-08-27 ENCOUNTER — OFFICE VISIT (OUTPATIENT)
Dept: URGENT CARE | Facility: URGENT CARE | Age: 46
End: 2021-08-27
Payer: COMMERCIAL

## 2021-08-27 VITALS
WEIGHT: 194 LBS | HEIGHT: 73 IN | BODY MASS INDEX: 25.71 KG/M2 | HEART RATE: 74 BPM | TEMPERATURE: 98 F | DIASTOLIC BLOOD PRESSURE: 76 MMHG | RESPIRATION RATE: 15 BRPM | OXYGEN SATURATION: 98 % | SYSTOLIC BLOOD PRESSURE: 106 MMHG

## 2021-08-27 DIAGNOSIS — N41.0 ACUTE BACTERIAL PROSTATITIS: Primary | ICD-10-CM

## 2021-08-27 DIAGNOSIS — N42.81 PROSTATE PAIN: Primary | ICD-10-CM

## 2021-08-27 DIAGNOSIS — N13.8 BPH WITH OBSTRUCTION/LOWER URINARY TRACT SYMPTOMS: ICD-10-CM

## 2021-08-27 DIAGNOSIS — N42.81 PROSTATE PAIN: ICD-10-CM

## 2021-08-27 DIAGNOSIS — N40.1 BPH WITH OBSTRUCTION/LOWER URINARY TRACT SYMPTOMS: ICD-10-CM

## 2021-08-27 LAB
ALBUMIN UR-MCNC: NEGATIVE MG/DL
APPEARANCE UR: CLEAR
BACTERIA #/AREA URNS HPF: ABNORMAL /HPF
BILIRUB UR QL STRIP: NEGATIVE
COLOR UR AUTO: YELLOW
GLUCOSE UR STRIP-MCNC: NEGATIVE MG/DL
HGB UR QL STRIP: NEGATIVE
KETONES UR STRIP-MCNC: NEGATIVE MG/DL
LEUKOCYTE ESTERASE UR QL STRIP: ABNORMAL
NITRATE UR QL: NEGATIVE
PH UR STRIP: 7 [PH] (ref 5–7)
RBC #/AREA URNS AUTO: ABNORMAL /HPF
SP GR UR STRIP: 1.01 (ref 1–1.03)
SQUAMOUS #/AREA URNS AUTO: ABNORMAL /LPF
UROBILINOGEN UR STRIP-ACNC: 0.2 E.U./DL
WBC #/AREA URNS AUTO: ABNORMAL /HPF

## 2021-08-27 PROCEDURE — 99215 OFFICE O/P EST HI 40 MIN: CPT | Performed by: FAMILY MEDICINE

## 2021-08-27 PROCEDURE — 99421 OL DIG E/M SVC 5-10 MIN: CPT | Performed by: PHYSICIAN ASSISTANT

## 2021-08-27 PROCEDURE — 81001 URINALYSIS AUTO W/SCOPE: CPT | Performed by: FAMILY MEDICINE

## 2021-08-27 RX ORDER — NAPROXEN 500 MG/1
500 TABLET ORAL 2 TIMES DAILY WITH MEALS
Qty: 10 TABLET | Refills: 0 | Status: SHIPPED | OUTPATIENT
Start: 2021-08-27 | End: 2021-09-01

## 2021-08-27 RX ORDER — SULFAMETHOXAZOLE/TRIMETHOPRIM 800-160 MG
1 TABLET ORAL 2 TIMES DAILY
Qty: 90 TABLET | Refills: 0 | Status: SHIPPED | OUTPATIENT
Start: 2021-08-27 | End: 2021-10-11

## 2021-08-27 ASSESSMENT — MIFFLIN-ST. JEOR: SCORE: 1808.86

## 2021-08-27 NOTE — PATIENT INSTRUCTIONS
Start Bactrim 2 times a day for 6 weeks take with food and plenty of water    If fever increased pain unable to empty your bladder stream becomes very weak to ER    Or ibuprofen 800 mg 3 times a day always take with food    Alternate with Tylenol 1000 mg 3 times a day maximum dose in 24 hours is 3000 mg    Please call for your urology appointment based at the number on the consult form noted below      Patient Education     Prostatitis     The prostate gland is located deep inside the body at the base of the bladder. Prostatitis is an inflammation of the prostate gland. This can occur with or without infection. Most cases of prostatitis are long term (chronic). Most do not include a bacterial infection.    Chronic prostatitis is more common in older men. It is often an inflammatory condition and not an infection. But bacterial infection can also cause chronic prostatitis. It can cause pain in the rectum, urethra, bladder, or scrotum. It can also make you unable to fully empty the bladder. You may urinate often. Or you may have burning with urination. Prostatitis may also cause painful ejaculation and erectile dysfunction.    Acute prostatitis happens suddenly. This often occurs in men younger than 35. It is from a bacterial infection. You may have severe symptoms such as fever, chills, muscle aches, and pain in the area between the scrotum and anus (perineum). You may have a hard time urinating. Or you may have pain or burning when urinating. There may be blood or pus in the urine.  Your healthcare provider may do a culture test on prostate fluids or discharge from the penis. This will help figure out if bacteria are the cause. Treatment can include antibiotics, anti-inflammatory medicine, prostate medicines, and stool softeners.  Home care  These guidelines will help you care for yourself at home:    Rest at home until the fever is gone and you are feeling better.    A hot sitz bath may offer some relief. Fill a  tub with 6 inches of hot water. Allow the water to run so you can keep it hot for 10 to 15 minutes.    Drink plenty of fluids. Don't drink alcohol or caffeine until all symptoms are gone.    If your healthcare gives you an antibiotic, take it exactly as you are told. Take it until it is all gone.    Constipation causes straining and pain. Prevent constipation by eating natural laxatives such as prunes, fresh fruits, and whole-grain cereals. If needed, use a mild over-the-counter (OTC) laxative for constipation. An OTC stool softener may be used to keep the stools soft.    If sex is uncomfortable or painful, don't have sex until symptoms get better.    You may use OTC medicines for pain and fever, unless another medicine was given. If you have chronic liver or kidney disease, talk with your healthcare provider before using these medicines. Also talk with your provider if you've ever had a stomach ulcer or GI (gastrointestinal) bleeding.  Follow-up care  Follow up with your healthcare provider, a urologist, or as advised to be sure you are responding to treatment. Your healthcare provider may want to see you after you finish your antibiotics to be sure the infection has cleared. If a culture was taken, you may call for the results as directed. A culture test can help your provider know if you are on the correct antibiotic.  Call 911  Call 911if any of these occur:    Weakness, dizziness, or fainting  When to get medical advice  Call your healthcare provider right away if any of these occur:    Fever of 100.4 F (38 C) or higher, or as directed by your healthcare provider    Unable to pass urine for 8 hours    Pressure or pain in your bladder gets worse    Painful swelling of the testicle or scrotum  [email or web address] last reviewed this educational content on 9/1/2019 2000-2021 [employer]. All rights reserved. This information is not intended as a substitute for professional medical care. Always follow your  healthcare professional's instructions.           Patient Education     Bacterial Prostatitis  The prostate gland is part of the male reproductive system. The gland sits just below the bladder. It surrounds the urethra. This is the tube that carries urine and semen out of the body. Bacterial prostatitis is an infection of the prostate. It makes the prostate painful and swollen. The problem often occurs suddenly. It can make you very sick. But it can be treated.      With a healthy prostate, urine flows easily through the urethra.       With a swollen prostate, the urethra narrows. It s harder for urine to go through.      Causes and symptoms  Bacterial prostatitis is due to infection with germs (bacteria). This infection makes the prostate swell. This squeezes the urethra, narrowing or blocking it. Symptoms may be severe. They can include:     Fever and chills    Low back pain    Having to urinate often    Pain with urination    A less forceful urine stream    Needing to strain to urinate    Inability to urinate  Treatment  The infection is treated with antibiotics. Take all of the medicine until it's gone, even if you start to feel better. If you don t, the infection may come back. And it may be harder to treat. Your healthcare provider may also suggest other care. This may include resting and drinking more fluid. Follow any instructions you are given.   Chronic bacterial prostatitis  Prostatitis can turn into a long-term (chronic) problem. In this case, the prostate stays swollen and inflamed despite treatment with antibiotics. One possible cause is repeated infections. Symptoms include pain and burning with urination, and needing to urinate often. It may also cause lower belly or back pain. If you have this problem, your healthcare provider will talk with you about a treatment plan. Treatment is often taking antibiotics for 6 to 12 weeks.   [employer] last reviewed this educational content on 11/1/2019 2000-2021  [employer]. All rights reserved. This information is not intended as a substitute for professional medical care. Always follow your healthcare professional's instructions.           Patient Education     Prostate Anatomy  The prostate gland is part of the male reproductive system. The prostate is located below the bladder. It surrounds the urethra, the tube that carries urine and semen out of the body. The function of the prostate is to produce fluid. This fluid mixes with fluid from the seminal vesicles and sperm from the testicles to form semen. During ejaculation, semen travels through the urethra and out of the penis. Prostate health is closely linked to hormones, chemicals that carry messages throughout the body. Normal levels of hormones, such as testosterone, keep the prostate working correctly.    [employer] last reviewed this educational content on 6/1/2019 2000-2021 [employer]. All rights reserved. This information is not intended as a substitute for professional medical care. Always follow your healthcare professional's instructions.           Patient Education     BPH (Enlarged Prostate)   The prostate is a gland at the base of the bladder. As some men get older, the prostate may get bigger. This problem is called benign prostatic hyperplasia (BPH). BPH puts pressure on the urethra. This is the tube that carries urine from the bladder to the penis. It may interfere with the flow of urine. It may also keep the bladder from emptying fully.    Symptoms of BPH include trouble starting urination and feeling as though the bladder isn t emptying all the way. It also includes a weak urine stream, dribbling and leaking of urine, and frequent and urgent urination (especially at night). BPH can increase the risk of urinary infections. It can also block off urine flow completely. If this occurs, a thin tube (catheter) may be passed into the bladder to help drain urine.  If symptoms are  mild, no treatment may be needed right now. If symptoms are more severe, treatment is likely needed. The goal of treatment is to improve urine flow and reduce symptoms. Treatments can include medicine and procedures. Your healthcare provider will talk about treatment options with you as needed.  Home care  The following guidelines will help you care for yourself at home:    Urinate as soon as you feel the urge. Don't try to hold your urine.    Don't limit your fluid intake during the day. Drink 6 to 8 glasses of water or liquids a day. This prevents bacteria from building up in the bladder.    Don't drink fluids after dinner. This helps to reduce urination during the night.    Don't take medicines that can make your symptoms worse. These include certain cold and allergy medicines and antidepressants. Diuretics used for high blood pressure can also make symptoms worse. Talk with your healthcare provider about the medicines you take. Other choices may work better for you.  Prostate cancer screening  BPH does not increase the risk of prostate cancer. But because prostate cancer is a common cancer in men, screening is sometimes advised. This may help find the cancer in its early stages when treatment is most effective. Factors that can increase the risk of prostate cancer include being  or having a father or brother who had prostate cancer. A high-fat diet may also increase the risk of prostate cancer. Talk with your healthcare provider to see if you should be screened for prostate cancer.  Follow-up care  Follow up with your healthcare provider, or as advised  To learn more, go to:    National [employer] U[employer] Information [employer]aringhouse kidney.niddk.nih.gov, 797.863.7880  When to get medical advice  Call your healthcare provider right away if any of these occur:    Fever of 100.4 F (38.0 C) or higher, or as advised    Unable to pass urine for 8 hours    More pressure or pain in your lower belly  (bladder)    Blood in the urine    Increasing low back pain that is not linked to injury    Symptoms of urinary infection (increased urge to urinate, burning when passing urine, bad-smelling urine)  [employer] last reviewed this educational content on 11/1/2019 2000-2021 [employer]. All rights reserved. This information is not intended as a substitute for professional medical care. Always follow your healthcare professional's instructions.           Patient Education     Benign Prostatic Hyperplasia  The prostate is a small gland that in men that makes a fluid that goes into semen. As you age, the prostate grows. If it becomes too big, it may cause problems with urination. This condition is called benign prostatic hyperplasia (BPH). BPH is not a cancer.    Symptoms of BPH  BPH is common in men over age 60. That s because the prostate grows bigger during a man s life. As it grows, it presses against the urethra. The urethra is the tube that carries urine out of your body from your bladder through your penis. Your bladder may also weaken as you age. It may not empty completely after you urinate.  Men with BPH may have these symptoms:    The urge to frequently urinate, especially at night    Leaking or dribbling of urine    A weak stream of urine    Not able to urinate, or having trouble starting to urinate  Diagnosing BPH  BPH can hurt your bladder and kidneys. It can also lead to bladder stones and urinary tract infections. If you think you may have BPH, talk with your healthcare provider. Early treatment can prevent problems.  Several tests can diagnose BPH. These include:    Digital rectal exam. During this procedure, your provider puts a gloved, greased (lubricated) finger into your rectum to check the size of your prostate.    Imaging tests. X-rays and other imaging tests can find problems in your kidneys or bladder.    Cystoscopy. This test uses a flexible tube with a camera (called a scope). The  scope is passed up the urethra to look inside your urinary tract.    Urine flow study. This test uses a special device to see how fast urine leaves your body.    Prostate ultrasound. This test uses sound waves to view the size and inside of the prostate gland.  Treating BPH  If you have mild symptoms, you may not need treatment. You may be able to control your BPH with lifestyle changes. Some men feel better if they limit or don't have alcohol and caffeinated drinks such as coffee. Not drinking too many fluids at night can also help. Increasing your physical activity may ease symptoms, too.  Kegel exercises may also help. They strengthen the pelvic muscle to prevent urine from leaking. Contract your pelvic muscles as if you were to stop or slow down the flow of urine. Hold for 10 seconds. Repeat at least 5 times. Do the exercise 3 to 5 times each day.  Certain medicines can worsen BPH symptoms. These include medicines for congestion, allergies, and depression. Medicines that increase your urine flow (diuretics or water pills) can also worsen BPH symptoms. If you take any of these, talk with your provider. You may need to take another medicine or change how much you take.  BPH symptoms often get worse as the prostate grows. So at some point you may need treatment. Your provider may prescribe medicine to shrink the prostate or stop its growth. Other treatments can make the urethra wider to let urine flow more easily. There are also some minimally invasive methods to remove prostate tissue.  If your BPH is severe, your healthcare provider may advise surgery. Surgery takes out enlarged parts of the prostate gland. You and your provider can figure out the best option for you based on your age, overall health, and other factors.  BPH and prostate cancer  BPH and prostate cancer share some symptoms. That s why it s important to talk with your provider about your symptoms. Men with BPH aren t more likely to develop this  cancer. But they may have higher levels of the prostate-specific antigen (PSA). A higher PSA level may also be a sign of prostate cancer. Certain tests help tell BPH from prostate cancer. They include prostate ultrasound and biopsy.  [employer] last reviewed this educational content on 9/1/2019 2000-2021 [employer]. All rights reserved. This information is not intended as a substitute for professional medical care. Always follow your healthcare professional's instructions.

## 2021-08-27 NOTE — PATIENT INSTRUCTIONS
Thank you for choosing us for your care. I have placed an order for a prescription so that you can start treatment. View your full visit summary for details by clicking on the link below. Your pharmacist will able to address any questions you may have about the medication.     If you're not feeling better within 5-7 days, please schedule an appointment.  You can schedule an appointment right here in [facility], or call [phone]  If the visit is for the same symptoms as your eVisit, we'll refund the cost of your eVisit if seen within seven days.

## 2021-08-27 NOTE — PROGRESS NOTES
Patient presents with:  Urgent Care  Prostate Problem: started a week ago, worse over past week.        Subjective     Jaun Lobo is a 46 year old male who presents to clinic today for the following health issues:    HPI     Genitourinary symptoms      Duration: 1 week     Description:  Pain in groin, painful to sit, in perineal area  Urgency, nocturia-2-3 times at night which is more than baseline, no fever, no dysuria     ? BPH-7/2021-spinal injury-the ER staff tried to place a catheter- thought he had blockage and unable to put in a catheter    He is not taking narcotics at this time, he stopped gabapentin/tramadol/meloxicam in last week     Nocturia for the last year prior to spinal injury, stream no change, no history of ED, no intercourse recently to to birth of new baby      Intensity:  severe    Accompanying signs and symptoms (fever/discharge/nausea/vomiting/back or abdominal pain):  No fever, no discharge, no tenderness in testicular area    History (frequent UTI's/kidney stones/prostate problems): None  Sexually active: YES    Precipitating or alleviating factors: None-sitting less due to his back, has to get up frequently due to back pain and now pain in the perineal area     Therapies tried and outcome: none          Past Medical History:   Diagnosis Date     Alcohol dependence (H)      Allergic rhinitis, cause unspecified     allergy shots as kid     Anxiety state, unspecified     on [place]'s     Back disorder     degenerative      DDD (degenerative disc disease)      Esophagitis      Lichen planus   2007     MEDICAL HISTORY OF - 3/10/10    ulcerative esophagitis     Mild intermittent asthma     rare use of albuteral     Tobacco use disorder     quit 2002     Unspecified hemorrhoids without mention of complication      Social History     Tobacco Use     Smoking status: Former Smoker     Smokeless tobacco: Never Used   Substance Use Topics     Alcohol use: No     Alcohol/week: 16.7 standard  "drinks     Types: 20 Cans of beer per week     Comment: 40 drinks a week , quit drinking 6 weeks ago        Current Outpatient Medications   Medication Sig Dispense Refill     acetaminophen (TYLENOL) 325 MG tablet Take 975 mg (3 tablets) by mouth 3 times daily for 3 days, then take 650 mg (2 tablets) by mouth every 6 hours as needed for mild- moderate pain. 60 tablet 0     sulfamethoxazole-trimethoprim (BACTRIM DS) 800-160 MG tablet Take 1 tablet by mouth 2 times daily 90 tablet 0     albuterol (PROAIR HFA, PROVENTIL HFA, VENTOLIN HFA) 108 (90 BASE) MCG/ACT inhaler Inhale 2 puffs into the lungs every 4 hours as needed for shortness of breath / dyspnea or wheezing        ciprofloxacin (CIPRO) 500 MG tablet Take 1 tablet (500 mg) by mouth 2 times daily for 28 days 56 tablet 0     cyclobenzaprine (FLEXERIL) 10 MG tablet Take 1 tablet (10 mg) by mouth every 8 hours as needed for muscle spasms 25 tablet 0     gabapentin (NEURONTIN) 300 MG capsule Take 1 capsule (300 mg) by mouth 3 times daily 90 capsule 1     melatonin 3 MG tablet Take 3 mg by mouth nightly as needed.       multivitamin w/minerals (THERA-VIT-M) tablet Take 1 tablet by mouth daily       polyethylene glycol (MIRALAX) 17 GM/Dose powder Take 17 g by mouth daily as needed for constipation 510 g 0     senna-docusate (SENOKOT-S/PERICOLACE) 8.6-50 MG tablet Take 2 tablets by mouth 2 times daily 60 tablet 0     temazepam (RESTORIL) 15 MG capsule Take 1/2 to 1 tablet PO at bedtime as needed for sleep 30 capsule 1     Allergies   Allergen Reactions     Antabuse [Disulfiram] Other (See Comments)     hepatitis     Campral [Acamprosate] Other (See Comments)     Mood changes     Animal Dander      Dust Mite Extract      No Known Drug Allergies      Ragweeds      goldenrod     Seasonal Allergies              ROS are negative, except as otherwise noted HPI      Objective    /76   Pulse 74   Temp 98  F (36.7  C) (Oral)   Resp 15   Ht 1.854 m (6' 1\")   Wt 88 kg " (194 lb)   SpO2 98%   BMI 25.60 kg/m    Body mass index is 25.6 kg/m .  Physical Exam   GENERAL:  alert and mild distress  ABDOMEN: soft, nontender, mild suprapubic tenderness no hepatosplenomegaly,  and bowel sounds normal   (male): normal male genitalia without lesions or urethral discharge, no hernia, testicles nontender bilateral   RECTAL (male): normal sphincter tone, no rectal masses, prostate enlarged boggy, tender, no obvious mass on palpation   MS: no gross musculoskeletal defects noted, no edema  NEURO: Normal strength and tone, mentation intact and speech normal  Normal gait, DTR +1 bilateral     Diagnostic Test Results:  Labs reviewed in Epic  Results for orders placed or performed in visit on 08/27/21   UA Macro with Reflex to Micro and Culture - lab collect     Status: Abnormal    Specimen: Urine, Clean Catch   Result Value Ref Range    Color Urine Yellow Colorless, Straw, Light Yellow, Yellow    Appearance Urine Clear Clear    Glucose Urine Negative Negative mg/dL    Bilirubin Urine Negative Negative    Ketones Urine Negative Negative mg/dL    Specific Gravity Urine 1.015 1.003 - 1.035    Blood Urine Negative Negative    pH Urine 7.0 5.0 - 7.0    Protein Albumin Urine Negative Negative mg/dL    Urobilinogen Urine 0.2 0.2, 1.0 E.U./dL    Nitrite Urine Negative Negative    Leukocyte Esterase Urine Trace (A) Negative   Urine Microscopic     Status: Abnormal   Result Value Ref Range    Bacteria Urine Few (A) None Seen /HPF    RBC Urine 0-2 0-2 /HPF /HPF    WBC Urine 5-10 (A) 0-5 /HPF /HPF    Squamous Epithelials Urine Few (A) None Seen /LPF    Narrative    Urine Culture not indicated           ASSESSMENT/PLAN:      ICD-10-CM    1. Acute bacterial prostatitis  N41.0 sulfamethoxazole-trimethoprim (BACTRIM DS) 800-160 MG tablet   2. BPH with obstruction/lower urinary tract symptoms  N40.1 Adult Urology Referral    N13.8    3. Prostate pain  N42.81 UA Macro with Reflex to Micro and Culture - lab collect      Adult Urology Referral     UA Macro with Reflex to Micro and Culture - lab collect     Urine Microscopic       Reviewed medication instructions and side effects. Follow up if experiences side effects.     I reviewed supportive care, otc meds to use if needed, expected course, and signs of concern.  Follow up as needed or if he does not improve within 1 day(s) or if worsens in any way.  Reviewed red flag symptoms and is to go to the ER if experiences any of these.     The use of Dragon/PowerMic dictation services may have been used to construct the content in this note; any grammatical or spelling errors are non-intentional. Please contact the author of this note directly if you are in need of any clarification.       On the day of the encounter, time spend on chart review, patient visit, review of testing, documentation and discussion with other providers was 40  minutes      Patient Instructions       Start Bactrim 2 times a day for 6 weeks take with food and plenty of water    If fever increased pain unable to empty your bladder stream becomes very weak to ER    Or ibuprofen 800 mg 3 times a day always take with food    Alternate with Tylenol 1000 mg 3 times a day maximum dose in 24 hours is 3000 mg    Please call for your urology appointment based at the number on the consult form noted below      Patient Education     Prostatitis     The prostate gland is located deep inside the body at the base of the bladder. Prostatitis is an inflammation of the prostate gland. This can occur with or without infection. Most cases of prostatitis are long term (chronic). Most do not include a bacterial infection.    Chronic prostatitis is more common in older men. It is often an inflammatory condition and not an infection. But bacterial infection can also cause chronic prostatitis. It can cause pain in the rectum, urethra, bladder, or scrotum. It can also make you unable to fully empty the bladder. You may urinate often. Or  you may have burning with urination. Prostatitis may also cause painful ejaculation and erectile dysfunction.    Acute prostatitis happens suddenly. This often occurs in men younger than 35. It is from a bacterial infection. You may have severe symptoms such as fever, chills, muscle aches, and pain in the area between the scrotum and anus (perineum). You may have a hard time urinating. Or you may have pain or burning when urinating. There may be blood or pus in the urine.  Your healthcare provider may do a culture test on prostate fluids or discharge from the penis. This will help figure out if bacteria are the cause. Treatment can include antibiotics, anti-inflammatory medicine, prostate medicines, and stool softeners.  Home care  These guidelines will help you care for yourself at home:    Rest at home until the fever is gone and you are feeling better.    A hot sitz bath may offer some relief. Fill a tub with 6 inches of hot water. Allow the water to run so you can keep it hot for 10 to 15 minutes.    Drink plenty of fluids. Don't drink alcohol or caffeine until all symptoms are gone.    If your healthcare gives you an antibiotic, take it exactly as you are told. Take it until it is all gone.    Constipation causes straining and pain. Prevent constipation by eating natural laxatives such as prunes, fresh fruits, and whole-grain cereals. If needed, use a mild over-the-counter (OTC) laxative for constipation. An OTC stool softener may be used to keep the stools soft.    If sex is uncomfortable or painful, don't have sex until symptoms get better.    You may use OTC medicines for pain and fever, unless another medicine was given. If you have chronic liver or kidney disease, talk with your healthcare provider before using these medicines. Also talk with your provider if you've ever had a stomach ulcer or GI (gastrointestinal) bleeding.  Follow-up care  Follow up with your healthcare provider, a urologist, or as  advised to be sure you are responding to treatment. Your healthcare provider may want to see you after you finish your antibiotics to be sure the infection has cleared. If a culture was taken, you may call for the results as directed. A culture test can help your provider know if you are on the correct antibiotic.  Call 911  Call 911if any of these occur:    Weakness, dizziness, or fainting  When to get medical advice  Call your healthcare provider right away if any of these occur:    Fever of 100.4 F (38 C) or higher, or as directed by your healthcare provider    Unable to pass urine for 8 hours    Pressure or pain in your bladder gets worse    Painful swelling of the testicle or scrotum  [employer] last reviewed this educational content on 9/1/2019 2000-2021 [employer]. All rights reserved. This information is not intended as a substitute for professional medical care. Always follow your healthcare professional's instructions.           Patient Education     Bacterial Prostatitis  The prostate gland is part of the male reproductive system. The gland sits just below the bladder. It surrounds the urethra. This is the tube that carries urine and semen out of the body. Bacterial prostatitis is an infection of the prostate. It makes the prostate painful and swollen. The problem often occurs suddenly. It can make you very sick. But it can be treated.      With a healthy prostate, urine flows easily through the urethra.       With a swollen prostate, the urethra narrows. It s harder for urine to go through.      Causes and symptoms  Bacterial prostatitis is due to infection with germs (bacteria). This infection makes the prostate swell. This squeezes the urethra, narrowing or blocking it. Symptoms may be severe. They can include:     Fever and chills    Low back pain    Having to urinate often    Pain with urination    A less forceful urine stream    Needing to strain to urinate    Inability  to urinate  Treatment  The infection is treated with antibiotics. Take all of the medicine until it's gone, even if you start to feel better. If you don t, the infection may come back. And it may be harder to treat. Your healthcare provider may also suggest other care. This may include resting and drinking more fluid. Follow any instructions you are given.   Chronic bacterial prostatitis  Prostatitis can turn into a long-term (chronic) problem. In this case, the prostate stays swollen and inflamed despite treatment with antibiotics. One possible cause is repeated infections. Symptoms include pain and burning with urination, and needing to urinate often. It may also cause lower belly or back pain. If you have this problem, your healthcare provider will talk with you about a treatment plan. Treatment is often taking antibiotics for 6 to 12 weeks.   [employer] last reviewed this educational content on 11/1/2019 2000-2021 [employer]. All rights reserved. This information is not intended as a substitute for professional medical care. Always follow your healthcare professional's instructions.           Patient Education     Prostate Anatomy  The prostate gland is part of the male reproductive system. The prostate is located below the bladder. It surrounds the urethra, the tube that carries urine and semen out of the body. The function of the prostate is to produce fluid. This fluid mixes with fluid from the seminal vesicles and sperm from the testicles to form semen. During ejaculation, semen travels through the urethra and out of the penis. Prostate health is closely linked to hormones, chemicals that carry messages throughout the body. Normal levels of hormones, such as testosterone, keep the prostate working correctly.    [employer] last reviewed this educational content on 6/1/2019 2000-2021 [employer]. All rights reserved. This information is not intended as a substitute for  professional medical care. Always follow your healthcare professional's instructions.           Patient Education     BPH (Enlarged Prostate)   The prostate is a gland at the base of the bladder. As some men get older, the prostate may get bigger. This problem is called benign prostatic hyperplasia (BPH). BPH puts pressure on the urethra. This is the tube that carries urine from the bladder to the penis. It may interfere with the flow of urine. It may also keep the bladder from emptying fully.    Symptoms of BPH include trouble starting urination and feeling as though the bladder isn t emptying all the way. It also includes a weak urine stream, dribbling and leaking of urine, and frequent and urgent urination (especially at night). BPH can increase the risk of urinary infections. It can also block off urine flow completely. If this occurs, a thin tube (catheter) may be passed into the bladder to help drain urine.  If symptoms are mild, no treatment may be needed right now. If symptoms are more severe, treatment is likely needed. The goal of treatment is to improve urine flow and reduce symptoms. Treatments can include medicine and procedures. Your healthcare provider will talk about treatment options with you as needed.  Home care  The following guidelines will help you care for yourself at home:    Urinate as soon as you feel the urge. Don't try to hold your urine.    Don't limit your fluid intake during the day. Drink 6 to 8 glasses of water or liquids a day. This prevents bacteria from building up in the bladder.    Don't drink fluids after dinner. This helps to reduce urination during the night.    Don't take medicines that can make your symptoms worse. These include certain cold and allergy medicines and antidepressants. Diuretics used for high blood pressure can also make symptoms worse. Talk with your healthcare provider about the medicines you take. Other choices may work better for you.  Prostate cancer  screening  BPH does not increase the risk of prostate cancer. But because prostate cancer is a common cancer in men, screening is sometimes advised. This may help find the cancer in its early stages when treatment is most effective. Factors that can increase the risk of prostate cancer include being  or having a father or brother who had prostate cancer. A high-fat diet may also increase the risk of prostate cancer. Talk with your healthcare provider to see if you should be screened for prostate cancer.  Follow-up care  Follow up with your healthcare provider, or as advised  To learn more, go to:    National Kidney & Urologic Diseases Information Clearinghouse kidney.niddk.nih.gov, 215.740.1691  When to get medical advice  Call your healthcare provider right away if any of these occur:    Fever of 100.4 F (38.0 C) or higher, or as advised    Unable to pass urine for 8 hours    More pressure or pain in your lower belly (bladder)    Blood in the urine    Increasing low back pain that is not linked to injury    Symptoms of urinary infection (increased urge to urinate, burning when passing urine, bad-smelling urine)  [employer] last reviewed this educational content on 11/1/2019 2000-2021 [employer]. All rights reserved. This information is not intended as a substitute for professional medical care. Always follow your healthcare professional's instructions.           Patient Education     Benign Prostatic Hyperplasia  The prostate is a small gland that in men that makes a fluid that goes into semen. As you age, the prostate grows. If it becomes too big, it may cause problems with urination. This condition is called benign prostatic hyperplasia (BPH). BPH is not a cancer.    Symptoms of BPH  BPH is common in men over age 60. That s because the prostate grows bigger during a man s life. As it grows, it presses against the urethra. The urethra is the tube that carries urine out of your body  from your bladder through your penis. Your bladder may also weaken as you age. It may not empty completely after you urinate.  Men with BPH may have these symptoms:    The urge to frequently urinate, especially at night    Leaking or dribbling of urine    A weak stream of urine    Not able to urinate, or having trouble starting to urinate  Diagnosing BPH  BPH can hurt your bladder and kidneys. It can also lead to bladder stones and urinary tract infections. If you think you may have BPH, talk with your healthcare provider. Early treatment can prevent problems.  Several tests can diagnose BPH. These include:    Digital rectal exam. During this procedure, your provider puts a gloved, greased (lubricated) finger into your rectum to check the size of your prostate.    Imaging tests. X-rays and other imaging tests can find problems in your kidneys or bladder.    Cystoscopy. This test uses a flexible tube with a camera (called a scope). The scope is passed up the urethra to look inside your urinary tract.    Urine flow study. This test uses a special device to see how fast urine leaves your body.    Prostate ultrasound. This test uses sound waves to view the size and inside of the prostate gland.  Treating BPH  If you have mild symptoms, you may not need treatment. You may be able to control your BPH with lifestyle changes. Some men feel better if they limit or don't have alcohol and caffeinated drinks such as coffee. Not drinking too many fluids at night can also help. Increasing your physical activity may ease symptoms, too.  Kegel exercises may also help. They strengthen the pelvic muscle to prevent urine from leaking. Contract your pelvic muscles as if you were to stop or slow down the flow of urine. Hold for 10 seconds. Repeat at least 5 times. Do the exercise 3 to 5 times each day.  Certain medicines can worsen BPH symptoms. These include medicines for congestion, allergies, and depression. Medicines that increase  your urine flow (diuretics or water pills) can also worsen BPH symptoms. If you take any of these, talk with your provider. You may need to take another medicine or change how much you take.  BPH symptoms often get worse as the prostate grows. So at some point you may need treatment. Your provider may prescribe medicine to shrink the prostate or stop its growth. Other treatments can make the urethra wider to let urine flow more easily. There are also some minimally invasive methods to remove prostate tissue.  If your BPH is severe, your healthcare provider may advise surgery. Surgery takes out enlarged parts of the prostate gland. You and your provider can figure out the best option for you based on your age, overall health, and other factors.  BPH and prostate cancer  BPH and prostate cancer share some symptoms. That s why it s important to talk with your provider about your symptoms. Men with BPH aren t more likely to develop this cancer. But they may have higher levels of the prostate-specific antigen (PSA). A higher PSA level may also be a sign of prostate cancer. Certain tests help tell BPH from prostate cancer. They include prostate ultrasound and biopsy.  [employer] last reviewed this educational content on 9/1/2019 2000-2021 [employer]. All rights reserved. This information is not intended as a substitute for professional medical care. Always follow your healthcare professional's instructions.

## 2021-08-30 ENCOUNTER — MYC MEDICAL ADVICE (OUTPATIENT)
Dept: FAMILY MEDICINE | Facility: CLINIC | Age: 46
End: 2021-08-30

## 2021-08-30 ENCOUNTER — TELEPHONE (OUTPATIENT)
Dept: FAMILY MEDICINE | Facility: CLINIC | Age: 46
End: 2021-08-30

## 2021-08-30 DIAGNOSIS — N41.0 ACUTE BACTERIAL PROSTATITIS: ICD-10-CM

## 2021-08-30 RX ORDER — CIPROFLOXACIN 500 MG/1
500 TABLET, FILM COATED ORAL 2 TIMES DAILY
Qty: 56 TABLET | Refills: 0 | Status: SHIPPED | OUTPATIENT
Start: 2021-08-30 | End: 2021-09-27

## 2021-08-30 NOTE — TELEPHONE ENCOUNTER
Jaun calling stating since starting the sulfa he's has insomnia and increased nausea, he has been taking the med with food and without food and still having the nausea. He would like to change the med to a different antibiotic. Pharmacy entered.    Thank you

## 2021-08-30 NOTE — TELEPHONE ENCOUNTER
Marcial Sotomayor PA-C  [facility] Triage Just now (1:28 PM)   EW  Alternative antibiotic sent to pharmacy. Please advise Jaun. Also placed urology referral per his request. Thanks!       Writer called patient, reviewed alternative antibiotic sent to pharmacy by MARTINA Sotomayor PA-C, and offered to send Urology referral information via [employer] message.    Patient verbalized understanding and in agreement with plan.    [employer] message sent to patient.    NEELAM DonovanN, RN  [facility]

## 2021-08-31 ENCOUNTER — THERAPY VISIT (OUTPATIENT)
Dept: PHYSICAL THERAPY | Facility: CLINIC | Age: 46
End: 2021-08-31
Payer: COMMERCIAL

## 2021-08-31 DIAGNOSIS — G89.29 CHRONIC BILATERAL LOW BACK PAIN WITHOUT SCIATICA: ICD-10-CM

## 2021-08-31 DIAGNOSIS — M54.50 CHRONIC BILATERAL LOW BACK PAIN WITHOUT SCIATICA: ICD-10-CM

## 2021-08-31 PROCEDURE — 97530 THERAPEUTIC ACTIVITIES: CPT | Mod: GP | Performed by: PHYSICAL THERAPIST

## 2021-08-31 PROCEDURE — 97110 THERAPEUTIC EXERCISES: CPT | Mod: GP | Performed by: PHYSICAL THERAPIST

## 2021-09-10 ENCOUNTER — THERAPY VISIT (OUTPATIENT)
Dept: PHYSICAL THERAPY | Facility: CLINIC | Age: 46
End: 2021-09-10
Payer: COMMERCIAL

## 2021-09-10 DIAGNOSIS — G89.29 CHRONIC BILATERAL LOW BACK PAIN WITHOUT SCIATICA: ICD-10-CM

## 2021-09-10 DIAGNOSIS — M54.50 CHRONIC BILATERAL LOW BACK PAIN WITHOUT SCIATICA: ICD-10-CM

## 2021-09-10 PROCEDURE — 97110 THERAPEUTIC EXERCISES: CPT | Mod: GP | Performed by: PHYSICAL THERAPIST

## 2021-09-10 PROCEDURE — 97530 THERAPEUTIC ACTIVITIES: CPT | Mod: GP | Performed by: PHYSICAL THERAPIST

## 2021-09-16 ENCOUNTER — PRE VISIT (OUTPATIENT)
Dept: UROLOGY | Facility: CLINIC | Age: 46
End: 2021-09-16

## 2021-09-17 ENCOUNTER — THERAPY VISIT (OUTPATIENT)
Dept: PHYSICAL THERAPY | Facility: CLINIC | Age: 46
End: 2021-09-17
Payer: COMMERCIAL

## 2021-09-17 DIAGNOSIS — G89.29 CHRONIC BILATERAL LOW BACK PAIN WITHOUT SCIATICA: ICD-10-CM

## 2021-09-17 DIAGNOSIS — M54.50 CHRONIC BILATERAL LOW BACK PAIN WITHOUT SCIATICA: ICD-10-CM

## 2021-09-17 PROCEDURE — 97112 NEUROMUSCULAR REEDUCATION: CPT | Mod: GP | Performed by: PHYSICAL THERAPIST

## 2021-09-17 PROCEDURE — 97530 THERAPEUTIC ACTIVITIES: CPT | Mod: GP | Performed by: PHYSICAL THERAPIST

## 2021-09-17 PROCEDURE — 97110 THERAPEUTIC EXERCISES: CPT | Mod: GP | Performed by: PHYSICAL THERAPIST

## 2021-09-19 ENCOUNTER — HEALTH MAINTENANCE LETTER (OUTPATIENT)
Age: 46
End: 2021-09-19

## 2021-09-21 ENCOUNTER — PRE VISIT (OUTPATIENT)
Dept: UROLOGY | Facility: CLINIC | Age: 46
End: 2021-09-21

## 2021-09-21 NOTE — TELEPHONE ENCOUNTER
Reason for visit: acute bacterial prostatitis, BPH consult     Dx/Hx/Sx: prostatitis, BPH    Records/imaging/labs/orders: in epic     At Rooming: video visit

## 2021-09-24 ENCOUNTER — VIRTUAL VISIT (OUTPATIENT)
Dept: UROLOGY | Facility: CLINIC | Age: 46
End: 2021-09-24
Attending: PHYSICIAN ASSISTANT
Payer: COMMERCIAL

## 2021-09-24 DIAGNOSIS — R10.2 PERINEAL PAIN IN MALE: Primary | ICD-10-CM

## 2021-09-24 DIAGNOSIS — N41.0 ACUTE BACTERIAL PROSTATITIS: ICD-10-CM

## 2021-09-24 PROCEDURE — 99203 OFFICE O/P NEW LOW 30 MIN: CPT | Mod: 95 | Performed by: NURSE PRACTITIONER

## 2021-09-24 RX ORDER — TAMSULOSIN HYDROCHLORIDE 0.4 MG/1
0.4 CAPSULE ORAL DAILY
Qty: 30 CAPSULE | Refills: 11 | Status: SHIPPED | OUTPATIENT
Start: 2021-09-24 | End: 2022-02-22

## 2021-09-24 NOTE — PROGRESS NOTES
Jaun is a 46 year old who is being evaluated via a billable video visit.      How would you like to obtain your AVS? MyChart  If the video visit is dropped, the invitation should be resent by: Text to cell phone: [phone]  Will anyone else be joining your video visit? No    Video Start Time: 1:05 PM  Video-Visit Details    Type of service:  Video Visit    Video End Time: 1:31 PM    Originating Location (pt. Location): Home    Distant Location (provider location):  [facility] [place]     Platform used for Video Visit: Tricia       Jaun JUSTIN Lashell complains of   Chief Complaint   Patient presents with     Consult For     prostatitis        Assessment/Plan:  46 year old male with recent diagnosis of acute prostatitis, now with ongoing, although intermittent symptoms, including perineal discomfort. Two weeks remaining of his antibiotic course and I encouraged him to complete this. I do also suspect a component of hypertonic pelvic floor, and we discussed this today. Recommend a referral to pelvic floor PT and he is amenable. He may also benefit from an alpha blocker, so will initiate today.  -Start Flomax 0.4 mg daily.  -Referral placed for pelvic floor PT.  -Follow up with me in 3 months to discuss symptoms.     Katheryn Acosta, CNP  Department of Urology      Subjective:   46 year old male with a history of alcohol dependence in remission, ankylosing spondylitis, lumbar DDD, and anxiety who presents via virtual consult regarding acute bacterial prostatitis and possible BPH. Per chart review, he was seen in urgent care on 8/27 for severe groin and perineal pain with pain while sitting. ALEKSEY noted to demonstrate an enlarged, boggy, and tender prostate. UA showing WBCs. Was started on a 6-week course of Bactrim BID, which will be completed in a little over 2 weeks. Also advised to utilize ibuprofen and acetaminophen for pain control.     Today was his 29th day of the antibiotics. He states  "that the sulfa is \"wreaking havoc on his equilibrium,\" and causing lung irritation. Has also had a dry itchy cough and insomnia. He does not think the antibiotics are making a huge difference in his symptoms. States that he was experiencing serious pain in his perineal area that was fairly consistent and persistent but oscillated a little in intensity, but now is intermittent. It feels like he's been \"kicked in the nuts,\" but other times he doesn't feel it at all. It hurt during the night once but then also when he was on a car ride to a [employer]; made it difficult to enjoy the concert. The one thing he feels has helped his pain was tramadol.    He otherwise denies dysuria or frequency. Stream has maybe been a bit slower. Denies fevers.       Objective:     Exam:   Patient is a 46 year old male   General Appearance: Well groomed, hygenic  Respiratory: No cough, no respiratory distress or labored breathing  Musculoskeletal: Grossly normal with no gross deficits  Skin: No discoloration or apparent rashes  Neurologic: No tremors  Psychiatric: Alert and oriented  Further examination is deferred due to the nature of our visit.             "

## 2021-09-24 NOTE — LETTER
9/24/2021       RE: Jaun Lobo  [address]  [place] [address]     Dear Colleague,    Thank you for referring your patient, Jaun Lobo, to the [facility] [place] at [facility]. Please see a copy of my visit note below.    Jaun is a 46 year old who is being evaluated via a billable video visit.      How would you like to obtain your AVS? MyChart  If the video visit is dropped, the invitation should be resent by: Text to cell phone: [phone]  Will anyone else be joining your video visit? No    Video Start Time: 1:05 PM  Video-Visit Details    Type of service:  Video Visit    Video End Time: 1:31 PM    Originating Location (pt. Location): Home    Distant Location (provider location):  [facility] [place]     Platform used for Video Visit: Tricia       Jaun Lobo complains of   Chief Complaint   Patient presents with     Consult For     prostatitis        Assessment/Plan:  46 year old male with recent diagnosis of acute prostatitis, now with ongoing, although intermittent symptoms, including perineal discomfort. Two weeks remaining of his antibiotic course and I encouraged him to complete this. I do also suspect a component of hypertonic pelvic floor, and we discussed this today. Recommend a referral to pelvic floor PT and he is amenable. He may also benefit from an alpha blocker, so will initiate today.  -Start Flomax 0.4 mg daily.  -Referral placed for pelvic floor PT.  -Follow up with me in 3 months to discuss symptoms.     Katheryn Acosta, CNP  Department of Urology      Subjective:   46 year old male with a history of alcohol dependence in remission, ankylosing spondylitis, lumbar DDD, and anxiety who presents via virtual consult regarding acute bacterial prostatitis and possible BPH. Per chart review, he was seen in urgent care on 8/27 for severe groin and perineal pain with pain while  "sitting. ALEKSEY noted to demonstrate an enlarged, boggy, and tender prostate. UA showing WBCs. Was started on a 6-week course of Bactrim BID, which will be completed in a little over 2 weeks. Also advised to utilize ibuprofen and acetaminophen for pain control.     Today was his 29th day of the antibiotics. He states that the sulfa is \"wreaking havoc on his equilibrium,\" and causing lung irritation. Has also had a dry itchy cough and insomnia. He does not think the antibiotics are making a huge difference in his symptoms. States that he was experiencing serious pain in his perineal area that was fairly consistent and persistent but oscillated a little in intensity, but now is intermittent. It feels like he's been \"kicked in the nuts,\" but other times he doesn't feel it at all. It hurt during the night once but then also when he was on a car ride to a [employer] concert; made it difficult to enjoy the concert. The one thing he feels has helped his pain was tramadol.    He otherwise denies dysuria or frequency. Stream has maybe been a bit slower. Denies fevers.       Objective:     Exam:   Patient is a 46 year old male   General Appearance: Well groomed, hygenic  Respiratory: No cough, no respiratory distress or labored breathing  Musculoskeletal: Grossly normal with no gross deficits  Skin: No discoloration or apparent rashes  Neurologic: No tremors  Psychiatric: Alert and oriented  Further examination is deferred due to the nature of our visit.       "

## 2021-09-24 NOTE — PATIENT INSTRUCTIONS
UROLOGY CLINIC VISIT PATIENT INSTRUCTIONS    -Start Flomax 0.4 mg daily.  -I have placed a referral to the pelvic floor physical therapy team.   -Follow up with me in 3 months to discuss symptoms.     If you have any issues, questions or concerns in the meantime, do not hesitate to contact us at [phone] or via [employer].     It was a pleasure meeting with you today.  Thank you for allowing me and my team the privilege of caring for you today.  YOU are the reason we are here, and I truly hope we provided you with the excellent service you deserve.  Please let us know if there is anything else we can do for you so that we can be sure you are leaving completely satisfied with your care experience.    Katheryn Acosta, CNP

## 2021-10-20 ENCOUNTER — THERAPY VISIT (OUTPATIENT)
Dept: PHYSICAL THERAPY | Facility: CLINIC | Age: 46
End: 2021-10-20
Attending: NURSE PRACTITIONER
Payer: COMMERCIAL

## 2021-10-20 DIAGNOSIS — R10.2 PERINEAL PAIN IN MALE: ICD-10-CM

## 2021-10-20 PROCEDURE — 97161 PT EVAL LOW COMPLEX 20 MIN: CPT | Mod: GP | Performed by: PHYSICAL THERAPIST

## 2021-10-20 PROCEDURE — 97110 THERAPEUTIC EXERCISES: CPT | Mod: GP | Performed by: PHYSICAL THERAPIST

## 2021-10-20 PROCEDURE — 97112 NEUROMUSCULAR REEDUCATION: CPT | Mod: GP | Performed by: PHYSICAL THERAPIST

## 2021-10-20 NOTE — PROGRESS NOTES
Physical Therapy Initial Evaluation  Subjective:  The history is provided by the patient.   Patient Health History  Jaun Lobo being seen for Prostatitis .     Date of Onset: July 2021.   Problem occurred: Traumatic Catherterization in hospital        Health conditions: Severe intermitent aches in perianal region             Current medications:  Muscle relaxants and sleep medication.    Current occupation is  .   Primary job tasks include:  Computer work, driving and prolonged standing.                                    Objective:  System    Physical Exam    General     ROS    Assessment/Plan:

## 2021-10-20 NOTE — PROGRESS NOTES
Physical Therapy Initial Evaluation  Subjective:  Pt reports history of perineal pain 7-21 after an attempt to catheterize him while in the hospital. Treatment to date has consisted of medication but patient still notes symptoms. Symptoms are intermittent and he can have some days when he is pain free but other days the pain can return. Pt referred by MD for physical therapy on 9-24-21. Pt has also recently been treated by physical therapy for lower back pain.       Therapist Generated HPI Evaluation         Type of problem:  Pelvic dysfunction (perineal pain).    This is a new condition.  Condition occurred with:  Other reason (catheterization).  Where condition occurred: in the community.  Site of Pain: perineal.  Pain is described as aching and is intermittent.  Radiates to: testicles. Pain is the same all the time.  Since onset symptoms are unchanged.  Exacerbated by: sitting, driving , lying supine.  Relieved by: standing.  Imaging testing: none.  Past treatment: none.   Restrictions due to condition include:  Working in normal job without restrictions.  Barriers include:  None as reported by patient.                        Objective:  Standing Alignment:          Pelvis deviations alignment: posterior rotation of the left inominate.                                                 Pelvic Dysfunction Evaluation:      Diagnostic Tests:  none                        Flexibility:    Tightness present at:Adductors; Iliopsoas and Hamstrings    Abdominal Wall:      Trigger Points:  Iliopsoas          External Assessment:    Skin Condition:  Normal    Bearing Down/Coughing:  Normal          SEMG Biofeedback:    Equipment:  Biofeedback    Suraface electrode placement--Perianal:  Bilateral   Baseline EMG PM:  3 mV pt was able to decrease resting muscle tone to 2 mV with guided relaxation                                 General     ROS    Assessment/Plan:    Patient is a 46 year old male with pelvic complaints.    Patient  has the following significant findings with corresponding treatment plan.                Diagnosis 1:  Pelvic pain  Pain -  self management, education, home program biofeedback, manual therapy as indicated  Decreased ROM/flexibility - therapeutic exercise, therapeutic activity and home program  Impaired muscle performance - biofeedback, neuro re-education and home program    Therapy Evaluation Codes:   1) History comprised of:   Personal factors that impact the plan of care:      None.    Comorbidity factors that impact the plan of care are:      lumbar pain.     Medications impacting care: Muscle relaxant, Sleep and flomax.  2) Examination of Body Systems comprised of:   Body structures and functions that impact the plan of care:      Pelvis.   Activity limitations that impact the plan of care are:      Driving and Sitting.  3) Clinical presentation characteristics are:   Stable/Uncomplicated.  4) Decision-Making    Low complexity using standardized patient assessment instrument and/or measureable assessment of functional outcome.  Cumulative Therapy Evaluation is: Low complexity.    Previous and current functional limitations:  (See Goal Flow Sheet for this information)    Short term and Long term goals: (See Goal Flow Sheet for this information)     Communication ability:  Patient appears to be able to clearly communicate and understand verbal and written communication and follow directions correctly.  Treatment Explanation - The following has been discussed with the patient:   RX ordered/plan of care  Anticipated outcomes  Possible risks and side effects  This patient would benefit from PT intervention to resume normal activities.   Rehab potential is good.    Frequency:  1 X week, once daily  Duration:  for 6 weeks  Discharge Plan:  Achieve all LTG.  Independent in home treatment program.  Reach maximal therapeutic benefit.    Please refer to the daily flowsheet for treatment today, total treatment time and  time spent performing 1:1 timed codes.

## 2021-10-27 ENCOUNTER — THERAPY VISIT (OUTPATIENT)
Dept: PHYSICAL THERAPY | Facility: CLINIC | Age: 46
End: 2021-10-27
Payer: COMMERCIAL

## 2021-10-27 DIAGNOSIS — R10.2 PERINEAL PAIN IN MALE: ICD-10-CM

## 2021-10-27 PROCEDURE — 97110 THERAPEUTIC EXERCISES: CPT | Mod: GP | Performed by: PHYSICAL THERAPIST

## 2021-10-27 PROCEDURE — 97140 MANUAL THERAPY 1/> REGIONS: CPT | Mod: GP | Performed by: PHYSICAL THERAPIST

## 2021-11-04 ENCOUNTER — THERAPY VISIT (OUTPATIENT)
Dept: PHYSICAL THERAPY | Facility: CLINIC | Age: 46
End: 2021-11-04
Payer: COMMERCIAL

## 2021-11-04 DIAGNOSIS — R10.2 PERINEAL PAIN IN MALE: ICD-10-CM

## 2021-11-04 PROCEDURE — 97140 MANUAL THERAPY 1/> REGIONS: CPT | Mod: GP | Performed by: PHYSICAL THERAPIST

## 2021-11-04 PROCEDURE — 97110 THERAPEUTIC EXERCISES: CPT | Mod: GP | Performed by: PHYSICAL THERAPIST

## 2021-11-04 NOTE — PROGRESS NOTES
Subjective:  HPI  Physical Exam                    Objective:  System    Physical Exam    General     ROS    Assessment/Plan:    SUBJECTIVE  Subjective changes as noted by pt:  Pt notes improved flexibility. Pt notes decrease in frequency of severe pain.    Current pain level: 4/10     Changes in function:  Pt notes increased ease with sitting     Adverse reaction to treatment or activity:  None    OBJECTIVE  Changes in objective findings:  Pt demonstrates improved flexibility in hip adductors and hip flexors. Pt remains tender to palpation proximal hip adductors        ASSESSMENT  Jaun continues to require intervention to meet STG and LTG's: PT   Patient's symptoms are resolving.  Response to therapy has shown an improvement in  flexibility  Progress made towards STG/LTG?  Yes,     PLAN  Current treatment program is being advanced to more complex exercises.    PTA/ATC plan:  N/A    Please refer to the daily flowsheet for treatment today, total treatment time and time spent performing 1:1 timed codes.

## 2021-11-10 ENCOUNTER — THERAPY VISIT (OUTPATIENT)
Dept: PHYSICAL THERAPY | Facility: CLINIC | Age: 46
End: 2021-11-10
Payer: COMMERCIAL

## 2021-11-10 DIAGNOSIS — R10.2 PERINEAL PAIN IN MALE: ICD-10-CM

## 2021-11-10 PROCEDURE — 97110 THERAPEUTIC EXERCISES: CPT | Mod: GP | Performed by: PHYSICAL THERAPIST

## 2021-11-10 NOTE — PROGRESS NOTES
Subjective:  HPI  Physical Exam                    Objective:  System    Physical Exam    General     ROS    Assessment/Plan:    SUBJECTIVE  Subjective changes as noted by pt:  Pt reports increased lumbar and groin pain since hunting this week end.      Current pain level: 5/10     Changes in function:  Pt notes increased pain with sitting and lumbar flexion.      Adverse reaction to treatment or activity:  None    OBJECTIVE  Changes in objective findings:  Pt notes decreased lumbar and pelvic pain with lumbar extension, lumbar flexion increased symptoms.         ASSESSMENT  Jaun continues to require intervention to meet STG and LTG's: PT  Patient has experienced an exacerbation of symptoms.  Response to therapy has shown a worsening of  pain level  Progress made towards STG/LTG?  Pt had been progressing until recent set back    PLAN  Current treatment program is being advanced to more complex exercises. Pt will focus on Bobo extension exercises exclusively for the next week.    PTA/ATC plan:  N/A    Please refer to the daily flowsheet for treatment today, total treatment time and time spent performing 1:1 timed codes.

## 2021-11-12 ENCOUNTER — PRE VISIT (OUTPATIENT)
Dept: UROLOGY | Facility: CLINIC | Age: 46
End: 2021-11-12
Payer: COMMERCIAL

## 2021-11-12 NOTE — TELEPHONE ENCOUNTER
Reason for visit: 3 month symptom check      Dx/Hx/Sx: perineal pain     Records/imaging/labs/orders: in epic     At Rooming: video visit

## 2021-11-18 ENCOUNTER — THERAPY VISIT (OUTPATIENT)
Dept: PHYSICAL THERAPY | Facility: CLINIC | Age: 46
End: 2021-11-18
Payer: COMMERCIAL

## 2021-11-18 DIAGNOSIS — R10.2 PERINEAL PAIN IN MALE: ICD-10-CM

## 2021-11-18 PROCEDURE — 97112 NEUROMUSCULAR REEDUCATION: CPT | Mod: GP | Performed by: PHYSICAL THERAPIST

## 2021-11-18 PROCEDURE — 97110 THERAPEUTIC EXERCISES: CPT | Mod: GP | Performed by: PHYSICAL THERAPIST

## 2021-11-18 NOTE — PROGRESS NOTES
Subjective:  HPI  Physical Exam                    Objective:  System    Physical Exam    General     ROS    Assessment/Plan:    DISCHARGE REPORT    Progress reporting period is from 10-20-21 to 11-18-21.       SUBJECTIVE  Subjective changes noted by patient:  Pt reports decrease in intensity of pain but still feels pelvic pain is having a negative impact in his daily life .       Current pain level is 3/10  .     Previous pain level was  5/10  .   Changes in function:  Pt still notes pain while driving, Cushion helps decrease pain when sitting in a chair pt can also have pain at night when trying to sleep.   Adverse reaction to treatment or activity: None    OBJECTIVE  Changes noted in objective findings:  Pelvic base is even, pt no longer demonstrates posterior rotation of the left inominate. Pt demonstrate improved flexibility in hamstrings, hip flexors and pelvic floor muscles. Pt still notes difficulty with decreasing muscle tone of pelvic floor muscles with biofeedback and breathing exercises.         ASSESSMENT/PLAN  Updated problem list and treatment plan: Diagnosis 1:  Pelvic pain  Pain -  hot/cold therapy, manual therapy, self management, education and home program  Decreased ROM/flexibility - manual therapy, therapeutic exercise, therapeutic activity and home program  Decreased strength - therapeutic exercise, therapeutic activities and home program  STG/LTGs have been met or progress has been made towards goals:  Yes,   Assessment of Progress: The patient's condition is improving.  Self Management Plans:  Patient has been instructed in a home treatment program.  I have re-evaluated this patient and find that the nature, scope, duration and intensity of the therapy is appropriate for the medical condition of the patient.  Jaun continues to require the following intervention to meet STG and LTG's:  PT intervention is no longer required to meet STG/LTG.    Recommendations:  Pt will continue with HEP and  return to MD to discuss further treatment options. Discussed with pt use of therawand to decrease pelvic floor trigger points. Pt expressed an interest in trying the therawand at home. Pt discharged at this time.     Please refer to the daily flowsheet for treatment today, total treatment time and time spent performing 1:1 timed codes.

## 2021-11-29 NOTE — PROGRESS NOTES
Jaun is a 46 year old who is being evaluated via a billable video visit.      How would you like to obtain your AVS? [email or web address]  If the video visit is dropped, the invitation should be resent by: Text to cell phone: [phone]  Will anyone else be joining your video visit? No    Video Start Time: 12:58 PM  Video-Visit Details    Type of service:  Video Visit    Video End Time:1:25 PM    Originating Location (pt. Location): Home    Distant Location (provider location):  [facility] [place]     Platform used for Video Visit: [employer]    Assessment/Plan:  46 year old male with ongoing pelvic pain symptoms, ongoing despite a 6-week course of antibiotics to cover possible prostatitis, as well as pelvic floor PT. He has had difficulty relaxing his pelvic floor, per PT notes. Recommend that we obtain an updated urinalysis now to make sure we are not missing something new and infectious, as well as a PSA in considering prostate involvement. He agrees with this plan. I also recommend a trial of rectal valium, which may help him gain some traction on his possible pelvic overactivity. He is also open to trying this. I advised that he use this only with symptom flare-ups, and this is not to be taken regularly. He should also not take this at the same time as his temazepam, which he takes for sleep. He agrees with this plan.   -UA and PSA now  -Rectal Valium as needed for symptom flare-ups. He will let me know via [employer] how his symptoms respond to this.   -He would like to touch base with me in the next few weeks, so will set up a virtual visit.   -If no response to the Valium, I recommend he see my colleague, Dr. Wells in the clinic for a second opinion on recommendations and management options. He agrees and would like to get this scheduled.       Katheryn Acosta, CNP  Department of Urology      Subjective:   46 year old male with a history of alcohol dependence in remission, ankylosing spondylitis,  "lumbar DDD, and anxiety who presents for virtual follow up. I last saw him two months ago, at which time he was completing a 6-week course of Bactrim for presumed prostatitis. After 29 days of antibiotics, he did not feel that his symptoms had really responded, and was experiencing bothersome side effects. I did advise that he complete the full course, although suspected a component of pelvic floor dysfunction contributing to his symptoms. PFPT referral placed, and he has been working with them for the past month. He was also started on Flomax for potential added benefit.     Per my review of his PT visit notes, it appears he has made some progress with his symptoms and was discharged from PT to continue exercises independently. However, he continued to have difficulty with decreasing muscle tone of his pelvic floor muscles with biofeedback and breathing exercises. He was recommended to try the Therawand device to help with pelvic floor relaxation.     Today, he states that he continues to have intermittent flares of severe pelvic symptoms. About 2-3 times per week his symptoms get bad. Describes this like a \"toothache.\" He feels that physical therapy did not make an impact on his symptoms.       Objective:     Exam:   Patient is a 46 year old male   No vital signs obtained due to virtual visit.  General Appearance: Well groomed, hygenic  Respiratory: No cough, no respiratory distress or labored breathing  Musculoskeletal: Grossly normal with no gross deficits  Skin: No discoloration or apparent rashes  Neurologic: No tremors  Psychiatric: Alert and oriented  Further examination is deferred due to the nature of our visit.               "

## 2021-11-30 ENCOUNTER — VIRTUAL VISIT (OUTPATIENT)
Dept: UROLOGY | Facility: CLINIC | Age: 46
End: 2021-11-30
Payer: COMMERCIAL

## 2021-11-30 DIAGNOSIS — R10.2 PELVIC PAIN IN MALE: Primary | ICD-10-CM

## 2021-11-30 PROCEDURE — 99213 OFFICE O/P EST LOW 20 MIN: CPT | Mod: 95 | Performed by: NURSE PRACTITIONER

## 2021-11-30 RX ORDER — DIAZEPAM 2.5 MG/.5ML
5 GEL RECTAL DAILY PRN
Qty: 20 SUPPOSITORY | Refills: 2 | Status: SHIPPED | OUTPATIENT
Start: 2021-11-30 | End: 2022-02-22

## 2021-11-30 RX ORDER — DIAZEPAM 2.5 MG/.5ML
5 GEL RECTAL DAILY PRN
Qty: 20 SUPPOSITORY | Refills: 2 | Status: SHIPPED | OUTPATIENT
Start: 2021-11-30 | End: 2021-11-30

## 2021-11-30 ASSESSMENT — PAIN SCALES - GENERAL: PAINLEVEL: NO PAIN (0)

## 2021-11-30 NOTE — LETTER
11/30/2021       RE: Jaun Lobo  [address]  [place] [address]     Dear Colleague,    Thank you for referring your patient, Jaun Lobo, to the [facility] [place] at [facility]. Please see a copy of my visit note below.    Jaun is a 46 year old who is being evaluated via a billable video visit.      How would you like to obtain your AVS? [employer]  If the video visit is dropped, the invitation should be resent by: Text to cell phone: [phone]  Will anyone else be joining your video visit? No    Video Start Time: 12:58 PM  Video-Visit Details    Type of service:  Video Visit    Video End Time:1:25 PM    Originating Location (pt. Location): Home    Distant Location (provider location):  [facility] [place]     Platform used for Video Visit: [employer]    Assessment/Plan:  46 year old male with ongoing pelvic pain symptoms, ongoing despite a 6-week course of antibiotics to cover possible prostatitis, as well as pelvic floor PT. He has had difficulty relaxing his pelvic floor, per PT notes. Recommend that we obtain an updated urinalysis now to make sure we are not missing something new and infectious, as well as a PSA in considering prostate involvement. He agrees with this plan. I also recommend a trial of rectal valium, which may help him gain some traction on his possible pelvic overactivity. He is also open to trying this. I advised that he use this only with symptom flare-ups, and this is not to be taken regularly. He should also not take this at the same time as his temazepam, which he takes for sleep. He agrees with this plan.   -UA and PSA now  -Rectal Valium as needed for symptom flare-ups. He will let me know via [email or web address] how his symptoms respond to this.   -He would like to touch base with me in the next few weeks, so will set up a virtual visit.   -If no response to the Valium, I recommend he see my  "colleague, Dr. Wells in the clinic for a second opinion on recommendations and management options. He agrees and would like to get this scheduled.       Katheryn Acosta CNP  Department of Urology      Subjective:   46 year old male with a history of alcohol dependence in remission, ankylosing spondylitis, lumbar DDD, and anxiety who presents for virtual follow up. I last saw him two months ago, at which time he was completing a 6-week course of Bactrim for presumed prostatitis. After 29 days of antibiotics, he did not feel that his symptoms had really responded, and was experiencing bothersome side effects. I did advise that he complete the full course, although suspected a component of pelvic floor dysfunction contributing to his symptoms. PFPT referral placed, and he has been working with them for the past month. He was also started on Flomax for potential added benefit.     Per my review of his PT visit notes, it appears he has made some progress with his symptoms and was discharged from PT to continue exercises independently. However, he continued to have difficulty with decreasing muscle tone of his pelvic floor muscles with biofeedback and breathing exercises. He was recommended to try the Therawand device to help with pelvic floor relaxation.     Today, he states that he continues to have intermittent flares of severe pelvic symptoms. About 2-3 times per week his symptoms get bad. Describes this like a \"toothache.\" He feels that physical therapy did not make an impact on his symptoms.       Objective:     Exam:   Patient is a 46 year old male   No vital signs obtained due to virtual visit.  General Appearance: Well groomed, hygenic  Respiratory: No cough, no respiratory distress or labored breathing  Musculoskeletal: Grossly normal with no gross deficits  Skin: No discoloration or apparent rashes  Neurologic: No tremors  Psychiatric: Alert and oriented  Further examination is deferred due to the nature of " our visit.

## 2021-11-30 NOTE — PATIENT INSTRUCTIONS
UROLOGY CLINIC VISIT PATIENT INSTRUCTIONS    -Will check a urinalysis and PSA level at the lab.  -I have prescribed the rectal Valium suppositories. Use one per day as needed, only for severe symptom flare-up. Do not use these regularly and do not take at the same time as your temazepam. Let me know how you respond to these via [employer].  -Follow up with me in 2-3 weeks via virtual visit to discuss symptoms.  -We can also get you scheduled for an in-person visit with Dr. Wells in the coming months.     If you have any issues, questions or concerns in the meantime, do not hesitate to contact us at [phone] or via [employer].       Katheryn Acosta, CNP  Department of Urology

## 2021-12-02 ENCOUNTER — LAB (OUTPATIENT)
Dept: LAB | Facility: CLINIC | Age: 46
End: 2021-12-02
Payer: COMMERCIAL

## 2021-12-02 DIAGNOSIS — R10.2 PELVIC PAIN IN MALE: ICD-10-CM

## 2021-12-02 LAB
ALBUMIN UR-MCNC: NEGATIVE MG/DL
APPEARANCE UR: CLEAR
BACTERIA #/AREA URNS HPF: NORMAL /HPF
BILIRUB UR QL STRIP: NEGATIVE
COLOR UR AUTO: YELLOW
GLUCOSE UR STRIP-MCNC: NEGATIVE MG/DL
HGB UR QL STRIP: NEGATIVE
KETONES UR STRIP-MCNC: NEGATIVE MG/DL
LEUKOCYTE ESTERASE UR QL STRIP: ABNORMAL
NITRATE UR QL: NEGATIVE
PH UR STRIP: 6 [PH] (ref 5–7)
RBC #/AREA URNS AUTO: NORMAL /HPF
SP GR UR STRIP: 1.02 (ref 1–1.03)
UROBILINOGEN UR STRIP-ACNC: 0.2 E.U./DL
WBC #/AREA URNS AUTO: NORMAL /HPF

## 2021-12-02 PROCEDURE — 84153 ASSAY OF PSA TOTAL: CPT

## 2021-12-02 PROCEDURE — 36415 COLL VENOUS BLD VENIPUNCTURE: CPT

## 2021-12-02 PROCEDURE — 81001 URINALYSIS AUTO W/SCOPE: CPT

## 2021-12-03 ENCOUNTER — PRE VISIT (OUTPATIENT)
Dept: UROLOGY | Facility: CLINIC | Age: 46
End: 2021-12-03
Payer: COMMERCIAL

## 2021-12-03 LAB — PSA SERPL-MCNC: 0.68 UG/L (ref 0–4)

## 2021-12-03 NOTE — TELEPHONE ENCOUNTER
Reason for visit: symptom check      Dx/Hx/Sx: pelvic pain     Records/imaging/labs/orders: in epic     At Rooming: video visit

## 2021-12-17 ENCOUNTER — VIRTUAL VISIT (OUTPATIENT)
Dept: UROLOGY | Facility: CLINIC | Age: 46
End: 2021-12-17
Payer: COMMERCIAL

## 2021-12-17 DIAGNOSIS — R10.2 PELVIC PAIN IN MALE: Primary | ICD-10-CM

## 2021-12-17 PROCEDURE — 99213 OFFICE O/P EST LOW 20 MIN: CPT | Mod: 95 | Performed by: NURSE PRACTITIONER

## 2021-12-17 NOTE — LETTER
12/17/2021       RE: Jaun Lobo  [address]  [place] [address]     Dear Colleague,    Thank you for referring your patient, Jaun Lobo, to the [facility] [place] at [facility]. Please see a copy of my visit note below.    Jaun is a 46 year old who is being evaluated via a billable video visit.      How would you like to obtain your AVS? MyChart  If the video visit is dropped, the invitation should be resent by: Send to e-mail at: [email or web address]  Will anyone else be joining your video visit? No    Video Start Time: 9:59 AM  Video-Visit Details    Type of service:  Video Visit    Video End Time: 10:22 AM    Originating Location (pt. Location): Home    Distant Location (provider location):  [facility] [place]     Platform used for Video Visit: [employer]       Jaun Lobo complains of   Chief Complaint   Patient presents with     Follow Up     pelvic pain        Assessment/Plan:  46 year old male with ongoing, intermittent perineal/pelvic pain despite 6-week course of antibiotics for presumed prostatitis, pelvic floor PT, trial of rectal Valium, as well as a self-directed trial of gabapentin. Testing, including UA and PSA, have been largely unremarkable. I remain suspicious of hypertonic pelvic floor, although with his history of spinal issues, it is possible this is nerve-related pain. He has a follow up visit scheduled with Dr. Wells next month and I recommend he keep this to get his recommendations. In the meantime, we will also pursue atypical infection testing (mycoplasma and ureaplasma), as well as a SA and culture in considering possible ongoing prostatitis. He agrees with this plan.     Katheryn Acosta, CNP  Department of Urology      Subjective:   46 year old male with a history of alcohol dependence in remission, ankylosing spondylitis, lumbar DDD, and anxiety who presents for  virtual follow up regarding pelvic symptoms. Symptoms have not improved with a 6-week course of antibiotics or pelvic floor PT. Our last visit was a few weeks ago, at which time he was started on as-needed rectal valium to help reduce pelvic floor spasticity, as this was suspected to be his primary issue. However, he has tried this a few times and has not noticed a significant difference in his symptoms. A PSA and UA were also obtained, which were largely unremarkable aside from trace LE on UA.    Symptoms have now been worse over the past 10 days. He has continued to work on his physical therapy exercises and has been sitting on a donut cushion. He feels he has been doing everything he can to help relieve his symptoms without improvement. He did have an old prescription of gabapentin that he even did resume for about 6 days but did not see a change in symptoms. He does note that he was actually taking gabapentin regularly when his symptoms first began, so does not think this is helpful.     He is driving out to [place] with his spouse and their eight-month-old daughter soon to celebrate holidays with relatives. He knows they will be taking frequent breaks with their daughter along, so will use this opportunity to get out of the car and stretch out.       Objective:     Exam:   Patient is a 46 year old male   No vital signs obtained due to virtual visit.  General Appearance: Well groomed, hygenic  Respiratory: No cough, no respiratory distress or labored breathing  Musculoskeletal: Grossly normal with no gross deficits  Skin: No discoloration or apparent rashes  Neurologic: No tremors  Psychiatric: Alert and oriented  Further examination is deferred due to the nature of our visit.

## 2021-12-17 NOTE — PATIENT INSTRUCTIONS
UROLOGY CLINIC VISIT PATIENT INSTRUCTIONS    -We will check an atypical infection panel (mycoplasma and ureaplasma). This is a urine test that can be done at the regular clinic lab.   -We will also obtain a semen analysis and culture to look for bacteria or other signs of infection. I did check into this and it should be done at the [facility]. This is unfortunately a separate lab from where you will do your urine sample. Please see below regarding scheduling. We are not concerned with your sperm count, so disregard the points that pertain to this.     If you have any issues, questions or concerns in the meantime, do not hesitate to contact us at [phone] or via [employer].       Katheryn Acosta, CNP  Department of Urology               Urology Clinic  Diagnostic Andrology    [facility]NOST[facility]ROLOGY LAB  [address] [place] [address] [address] [place] [id number]  Call [phone] to schedule        All tests are done by scheduled appointment ONLY.      You should abstain from ejaculation for 2-7 days prior to semen analysis.  Ejaculating too frequently can deplete the counts whereas abstaining for long periods of time can decrease the number of live sperm.      We strongly recommend that semen specimens be collected at the clinic.   Sperm start to die the longer they are out of the body.  If you strongly prefer to collect at home, do so with a specimen container that we provide and get the specimen to us within 30 minutes.  The specimen should be kept at body temperature by keeping it next to your body.      You cannot use lubrication, powders, saliva, or intercourse to produce a sample.   Samples should be produced by masturbation.  Partners can be in the room and help collect the sample if you desire.

## 2021-12-17 NOTE — PROGRESS NOTES
Jaun is a 46 year old who is being evaluated via a billable video visit.      How would you like to obtain your AVS? MyChart  If the video visit is dropped, the invitation should be resent by: Send to e-mail at: [email or web address]  Will anyone else be joining your video visit? No    Video Start Time: 9:59 AM  Video-Visit Details    Type of service:  Video Visit    Video End Time: 10:22 AM    Originating Location (pt. Location): Home    Distant Location (provider location):  [facility] [place]     Platform used for Video Visit: [facility]       Jaun Lobo complains of   Chief Complaint   Patient presents with     Follow Up     pelvic pain        Assessment/Plan:  46 year old male with ongoing, intermittent perineal/pelvic pain despite 6-week course of antibiotics for presumed prostatitis, pelvic floor PT, trial of rectal Valium, as well as a self-directed trial of gabapentin. Testing, including UA and PSA, have been largely unremarkable. I remain suspicious of hypertonic pelvic floor, although with his history of spinal issues, it is possible this is nerve-related pain. He has a follow up visit scheduled with Dr. Wells next month and I recommend he keep this to get his recommendations. In the meantime, we will also pursue atypical infection testing (mycoplasma and ureaplasma), as well as a SA and culture in considering possible ongoing prostatitis. He agrees with this plan.     Katheryn Acosta, CNP  Department of Urology      Subjective:   46 year old male with a history of alcohol dependence in remission, ankylosing spondylitis, lumbar DDD, and anxiety who presents for virtual follow up regarding pelvic symptoms. Symptoms have not improved with a 6-week course of antibiotics or pelvic floor PT. Our last visit was a few weeks ago, at which time he was started on as-needed rectal valium to help reduce pelvic floor spasticity, as this was suspected to be his primary issue.  However, he has tried this a few times and has not noticed a significant difference in his symptoms. A PSA and UA were also obtained, which were largely unremarkable aside from trace LE on UA.    Symptoms have now been worse over the past 10 days. He has continued to work on his physical therapy exercises and has been sitting on a donut cushion. He feels he has been doing everything he can to help relieve his symptoms without improvement. He did have an old prescription of gabapentin that he even did resume for about 6 days but did not see a change in symptoms. He does note that he was actually taking gabapentin regularly when his symptoms first began, so does not think this is helpful.     He is driving out to [place] with his spouse and their eight-month-old daughter soon to celebrate holidays with relatives. He knows they will be taking frequent breaks with their daughter along, so will use this opportunity to get out of the car and stretch out.       Objective:     Exam:   Patient is a 46 year old male   No vital signs obtained due to virtual visit.  General Appearance: Well groomed, hygenic  Respiratory: No cough, no respiratory distress or labored breathing  Musculoskeletal: Grossly normal with no gross deficits  Skin: No discoloration or apparent rashes  Neurologic: No tremors  Psychiatric: Alert and oriented  Further examination is deferred due to the nature of our visit.

## 2021-12-30 ENCOUNTER — PRE VISIT (OUTPATIENT)
Dept: UROLOGY | Facility: CLINIC | Age: 46
End: 2021-12-30
Payer: COMMERCIAL

## 2021-12-30 NOTE — TELEPHONE ENCOUNTER
Reason for visit: Follow up     Relevant information: perineal pain    Records/imaging/labs/orders: in Epic; needs labs    Pt called: no    At Rooming: normal

## 2022-01-09 ENCOUNTER — HEALTH MAINTENANCE LETTER (OUTPATIENT)
Age: 47
End: 2022-01-09

## 2022-01-10 PROBLEM — R10.2 PERINEAL PAIN IN MALE: Status: RESOLVED | Noted: 2021-10-20 | Resolved: 2022-01-10

## 2022-02-18 NOTE — PROGRESS NOTES
[facility] Initial Evaluation    Subjective:  Jaun Lobo is a 47 year old male with a lumbar condition. Mechanism of injury: Chronic LBP and perineal pain since July of 2021. Had outpatient PT for LBP which helped. Then, had PT for perineal pain which patient is unsure was beneficial. Patient then went to a pain clinic and had two steroid injections in January. Low back and perineal pain improved significantly. However, pain has been slowly returning for unknown reasons. Patient was advised to have another steroid injection but will need to complete at least one PT session. Patient is hoping to avoid injection if possible. Where: (home, work, MVA, community, recreation/sport, unknown, other): NA. Onset of symptoms: late January 2022. Location of symptoms: bilateral low back, perineal region. Pain level on number scale: 3-4/10. Quality of pain: ache, burning. Associated symptoms: denies numbness and tingling. Pain frequency (constant/intermittent): intermittent. Symptoms are exacerbated by: washing, dressing, lifting, walking, sitting, driving,. Symptoms are relieved by: moving, using standing desk. Progression of symptoms since onset (same/better/worse): worse. Special tests (x-ray, MRI, CT scan, EMG, bone scan): x-ray, MRI. Previous treatment: steroid injections in January 2022. Improvement with previous treatment: yes, temporarily. General health as reported by patient is excellent. Pertinent medical history includes:  see Epic. Medical allergies includes: see Epic. Surgical history includes: see Epic. Current medications include: see Epic. Occupation: /desk. Patient is (working in normal job without restrictions/working in normal job with restrictions/working in an alternate job/not working due to present treatment problem): working in normal job. Primary job tasks: Computer work, lifting/carrying and prolonged sittingd. Barriers at home/work: None reported by patient.  Red flags: None reported by patient.    Objective  Posture    Sitting (good/fair/poor): fair  Standing (good/fair/poor):  fair  Lordosis (red/acc/normal):  Normal  Lateral shift (right/left/nil):  nil  Relevant lateral shift (yes/no):  no  Correction of posture (better/worse/no effect): better    Neurological    Myotomes L R   L1-2 (hip flexion) 5/5 5/5   L3 (knee extension) 5/5 5/5   L4 (ankle DF) 5/5 5/5   L5 (g. toe ext) 5/5 5/5   S1 (ankle PF or knee flex) 5/5 5/5     Sensory Deficit, Reflexes: lumbar dermatomes    Lumbar Movement Loss Quinn Mod Min Nil Pain   Flexion   x  +   Extension   x     Side Gliding L   x  +   Side Gliding R   x  +     Assessment/Plan:    Patient is a 47 year old male with lumbar complaints.    Patient has the following significant findings with corresponding treatment plan.                Diagnosis 1:  LBP and perineal pain  Pain -  manual therapy, self management, education, directional preference exercise and home program  Decreased ROM/flexibility - manual therapy and therapeutic exercise  Decreased joint mobility - manual therapy and therapeutic exercise  Decreased strength - therapeutic exercise and therapeutic activities  Impaired muscle performance - neuro re-education  Decreased function - therapeutic activities  Impaired posture - neuro re-education    Previous and current functional limitations:  (See Goal Flow Sheet for this information)    Short term and Long term goals: (See Goal Flow Sheet for this information)     Communication ability:  Patient appears to be able to clearly communicate and understand verbal and written communication and follow directions correctly.  Treatment Explanation - The following has been discussed with the patient:   RX ordered/plan of care  Anticipated outcomes  Possible risks and side effects  This patient would benefit from PT intervention to resume normal activities.   Rehab potential is good.    Frequency:  1 X week, once daily  Duration:  for  8 weeks  Discharge Plan:  Achieve all LTG.  Independent in home treatment program.  Reach maximal therapeutic benefit.    Please refer to the daily flowsheet for treatment today, total treatment time and time spent performing 1:1 timed codes.

## 2022-02-22 ENCOUNTER — THERAPY VISIT (OUTPATIENT)
Dept: PHYSICAL THERAPY | Facility: CLINIC | Age: 47
End: 2022-02-22
Payer: COMMERCIAL

## 2022-02-22 ENCOUNTER — OFFICE VISIT (OUTPATIENT)
Dept: FAMILY MEDICINE | Facility: CLINIC | Age: 47
End: 2022-02-22
Payer: COMMERCIAL

## 2022-02-22 VITALS
BODY MASS INDEX: 25.71 KG/M2 | OXYGEN SATURATION: 98 % | DIASTOLIC BLOOD PRESSURE: 72 MMHG | SYSTOLIC BLOOD PRESSURE: 137 MMHG | WEIGHT: 194 LBS | RESPIRATION RATE: 18 BRPM | TEMPERATURE: 98.9 F | HEIGHT: 73 IN | HEART RATE: 85 BPM

## 2022-02-22 DIAGNOSIS — G89.29 CHRONIC BILATERAL LOW BACK PAIN WITHOUT SCIATICA: ICD-10-CM

## 2022-02-22 DIAGNOSIS — R10.2 PERINEAL PAIN: ICD-10-CM

## 2022-02-22 DIAGNOSIS — Z12.5 SCREENING FOR PROSTATE CANCER: ICD-10-CM

## 2022-02-22 DIAGNOSIS — F10.21 ALCOHOL DEPENDENCE IN REMISSION (H): ICD-10-CM

## 2022-02-22 DIAGNOSIS — Z00.00 ROUTINE GENERAL MEDICAL EXAMINATION AT A HEALTH CARE FACILITY: Primary | ICD-10-CM

## 2022-02-22 DIAGNOSIS — Z12.11 COLON CANCER SCREENING: ICD-10-CM

## 2022-02-22 DIAGNOSIS — M62.838 MUSCLE SPASM: ICD-10-CM

## 2022-02-22 DIAGNOSIS — M54.50 CHRONIC BILATERAL LOW BACK PAIN WITHOUT SCIATICA: ICD-10-CM

## 2022-02-22 DIAGNOSIS — Z13.220 LIPID SCREENING: ICD-10-CM

## 2022-02-22 DIAGNOSIS — Z51.81 ENCOUNTER FOR THERAPEUTIC DRUG MONITORING: ICD-10-CM

## 2022-02-22 DIAGNOSIS — G47.00 INSOMNIA, UNSPECIFIED TYPE: ICD-10-CM

## 2022-02-22 LAB
ERYTHROCYTE [DISTWIDTH] IN BLOOD BY AUTOMATED COUNT: 12.2 % (ref 10–15)
HCT VFR BLD AUTO: 41.3 % (ref 40–53)
HGB BLD-MCNC: 14.1 G/DL (ref 13.3–17.7)
MCH RBC QN AUTO: 29.9 PG (ref 26.5–33)
MCHC RBC AUTO-ENTMCNC: 34.1 G/DL (ref 31.5–36.5)
MCV RBC AUTO: 88 FL (ref 78–100)
PLATELET # BLD AUTO: 264 10E3/UL (ref 150–450)
RBC # BLD AUTO: 4.72 10E6/UL (ref 4.4–5.9)
WBC # BLD AUTO: 5.7 10E3/UL (ref 4–11)

## 2022-02-22 PROCEDURE — 80061 LIPID PANEL: CPT | Performed by: FAMILY MEDICINE

## 2022-02-22 PROCEDURE — 80053 COMPREHEN METABOLIC PANEL: CPT | Performed by: FAMILY MEDICINE

## 2022-02-22 PROCEDURE — 84443 ASSAY THYROID STIM HORMONE: CPT | Performed by: FAMILY MEDICINE

## 2022-02-22 PROCEDURE — 97530 THERAPEUTIC ACTIVITIES: CPT | Mod: GP | Performed by: PHYSICAL THERAPIST

## 2022-02-22 PROCEDURE — 99396 PREV VISIT EST AGE 40-64: CPT | Performed by: FAMILY MEDICINE

## 2022-02-22 PROCEDURE — 97110 THERAPEUTIC EXERCISES: CPT | Mod: GP | Performed by: PHYSICAL THERAPIST

## 2022-02-22 PROCEDURE — 36415 COLL VENOUS BLD VENIPUNCTURE: CPT | Performed by: FAMILY MEDICINE

## 2022-02-22 PROCEDURE — 82306 VITAMIN D 25 HYDROXY: CPT | Performed by: FAMILY MEDICINE

## 2022-02-22 PROCEDURE — 99214 OFFICE O/P EST MOD 30 MIN: CPT | Mod: 25 | Performed by: FAMILY MEDICINE

## 2022-02-22 PROCEDURE — 85027 COMPLETE CBC AUTOMATED: CPT | Performed by: FAMILY MEDICINE

## 2022-02-22 PROCEDURE — 82550 ASSAY OF CK (CPK): CPT | Performed by: FAMILY MEDICINE

## 2022-02-22 PROCEDURE — 97161 PT EVAL LOW COMPLEX 20 MIN: CPT | Mod: GP | Performed by: PHYSICAL THERAPIST

## 2022-02-22 RX ORDER — TEMAZEPAM 15 MG/1
CAPSULE ORAL
Qty: 72 CAPSULE | Refills: 0 | Status: SHIPPED | OUTPATIENT
Start: 2022-02-22 | End: 2022-07-22

## 2022-02-22 ASSESSMENT — ENCOUNTER SYMPTOMS
HEMATURIA: 0
DIZZINESS: 0
COUGH: 0
MYALGIAS: 1
NAUSEA: 0
DIARRHEA: 0
JOINT SWELLING: 0
SHORTNESS OF BREATH: 0
PARESTHESIAS: 0
PALPITATIONS: 0
FREQUENCY: 0
DYSURIA: 0
FEVER: 0
CHILLS: 0
WEAKNESS: 1
NERVOUS/ANXIOUS: 1
CONSTIPATION: 0
HEADACHES: 0
SORE THROAT: 0
EYE PAIN: 0
HEARTBURN: 0
ABDOMINAL PAIN: 0
ARTHRALGIAS: 0
HEMATOCHEZIA: 0

## 2022-02-22 ASSESSMENT — ASTHMA QUESTIONNAIRES
QUESTION_2 LAST FOUR WEEKS HOW OFTEN HAVE YOU HAD SHORTNESS OF BREATH: NOT AT ALL
ACT_TOTALSCORE: 25
ACT_TOTALSCORE: 25
QUESTION_3 LAST FOUR WEEKS HOW OFTEN DID YOUR ASTHMA SYMPTOMS (WHEEZING, COUGHING, SHORTNESS OF BREATH, CHEST TIGHTNESS OR PAIN) WAKE YOU UP AT NIGHT OR EARLIER THAN USUAL IN THE MORNING: NOT AT ALL
QUESTION_1 LAST FOUR WEEKS HOW MUCH OF THE TIME DID YOUR ASTHMA KEEP YOU FROM GETTING AS MUCH DONE AT WORK, SCHOOL OR AT HOME: NONE OF THE TIME
ACUTE_EXACERBATION_TODAY: NO
QUESTION_4 LAST FOUR WEEKS HOW OFTEN HAVE YOU USED YOUR RESCUE INHALER OR NEBULIZER MEDICATION (SUCH AS ALBUTEROL): NOT AT ALL
QUESTION_5 LAST FOUR WEEKS HOW WOULD YOU RATE YOUR ASTHMA CONTROL: COMPLETELY CONTROLLED

## 2022-02-22 ASSESSMENT — PATIENT HEALTH QUESTIONNAIRE - PHQ9
SUM OF ALL RESPONSES TO PHQ QUESTIONS 1-9: 5
SUM OF ALL RESPONSES TO PHQ QUESTIONS 1-9: 5
10. IF YOU CHECKED OFF ANY PROBLEMS, HOW DIFFICULT HAVE THESE PROBLEMS MADE IT FOR YOU TO DO YOUR WORK, TAKE CARE OF THINGS AT HOME, OR GET ALONG WITH OTHER PEOPLE: SOMEWHAT DIFFICULT

## 2022-02-22 NOTE — LETTER
[facility]  02/22/22  Patient: Jaun Lobo  YOB: 1975  Medical Record Number: [id number]                                                                                  Non-Opioid Controlled Substance Agreement    This is an agreement between you and your provider regarding safe and appropriate use of controlled substances prescribed by your care team. Controlled substances are?medicines that can cause physical and mental dependence (abuse).     There are strict laws about having and using these medicines. We here at [facility] are  committed to working with you in your efforts to get better. To support you in this work, we'll help you schedule regular office appointments for medicine refills. If we must cancel or change your appointment for any reason, we'll make sure you have enough medicine to last until your next appointment.     As a Provider, I will:     Listen carefully to your concerns while treating you with respect.     Recommend a treatment plan that I believe is in your best interest and may involve therapies other than medicine.      Talk with you often about the possible benefits and the risk of harm of any medicine that we prescribe for you.    Assess the safety of this medicine and check how well it works.      Provide a plan on how to taper (discontinue or go off) using this medicine if the decision is made to stop its use.      ::  As a Patient, I understand controlled substances:       Are prescribed by my care provider to help me function or work and manage my condition(s).?    Are strong medicines and can cause serious side effects.       Need to be taken exactly as prescribed.?Combining controlled substances with certain medicines or chemicals (such as illegal drugs, alcohol, sedatives, sleeping pills, and benzodiazepines) can be dangerous or even fatal.? If I stop taking my medicines suddenly, I may have severe withdrawal symptoms.     The  risks, benefits, and side effects of these medicine(s) were explained to me. I agree that:    1. I will take part in other treatments as advised by my care team. This may be psychiatry or counseling, physical therapy, behavioral therapy, group treatment or a referral to specialist.    2. I will keep all my appointments and understand this is part of the monitoring of controlled substances.?My care team may require an office visit for EVERY controlled substance refill. If I miss appointments or don t follow instructions, my care team may stop my medicine    3. I will take my medicines as prescribed. I will not change the dose or schedule unless my care team tells me to. There will be no refills if I run out early.      4. I may be asked to come to the clinic and complete a urine drug test or complete a pill count. If I don t give a urine sample or participate in a pill count, the care team may stop my medicine.    5. I will only receive controlled substance prescriptions from this clinic. If I am treated by another provider, I will tell them that I am taking controlled substances and that I have a treatment agreement with this provider. I will inform my [facility] care team within one business day if I am given a prescription for any controlled substance by another healthcare provider. My [facility] care team can contact other providers and pharmacists about my use of any medicines.    6. It is up to me to make sure that I don't run out of my medicines on weekends or holidays.?If my care team is willing to refill my prescription without a visit, I must request refills only during office hours. Refills may take up to 3 business days to process. I will use one pharmacy to fill all my controlled substance prescriptions. I will notify the clinic about any changes to my insurance or medicine availability.    7. I am responsible for my prescriptions. If the medicine/prescription is lost, stolen or destroyed,  it will not be replaced.?I also agree not to share controlled substance medicines with anyone.     8. I am aware I should not use any illegal or recreational drugs. I agree not to drink alcohol unless my care team says I can.     9. If I enroll in the [place] Medical Cannabis program, I will tell my care team before my next refill.    10. I will tell my care team right away if I become pregnant, have a new medical problem treated outside of my regular clinic, or have a change in my medicines.     11. I understand that this medicine can affect my thinking, judgment and reaction time.? Alcohol and drugs affect the brain and body, which can affect the safety of my driving. Being under the influence of alcohol or drugs can affect my decision-making, behaviors, personal safety and the safety of others. Driving while impaired (DWI) can occur if a person is driving, operating or in physical control of a car, motorcycle, boat, snowmobile, ATV, motorbike, off-road vehicle or any other motor vehicle ([place] Statute 169A.20). I understand the risk if I choose to drive or operate any vehicle or machinery.    I understand that if I do not follow any of the conditions above, my prescriptions or treatment may be stopped or changed.   I agree that my provider, clinic care team and pharmacy may work with any city, state or federal law enforcement agency that investigates the misuse, sale or other diversion of my controlled medicine. I will allow my provider to discuss my care with, or share a copy of, this agreement with any other treating provider, pharmacy or emergency room where I receive care.     I have read this agreement and have asked questions about anything I did not understand.    ________________________________________________________  Patient Signature - Jaun Lobo     ___________________                   Date     ________________________________________________________  Provider Signature - Harleen Ny MD        ___________________                   Date     ________________________________________________________  Witness Signature (required if provider not present while patient signing)          ___________________                   Date

## 2022-02-22 NOTE — PROGRESS NOTES
SUBJECTIVE:   CC: Jaun Lobo is an 47 year old male who presents for preventative health visit.       Patient has been advised of split billing requirements and indicates understanding: Yes  Healthy Habits:     Getting at least 3 servings of Calcium per day:  Yes    Bi-annual eye exam:  Yes    Dental care twice a year:  Yes    Sleep apnea or symptoms of sleep apnea:  None    Diet:  Regular (no restrictions)    Frequency of exercise:  6-7 days/week    Duration of exercise:  30-45 minutes    Taking medications regularly:  Yes    Medication side effects:  Muscle aches    PHQ-2 Total Score: 3    Additional concerns today:  Yes    Started being seen at [facility] at the end of December 2021 for back and perineal pain. He started receiving steroid injection since Dec 2021. He is continuing physical therapy.       He takes restoril two to three times/week. He has no side effects from  Medication. He has been taking medication for 10 years.   Today's PHQ-2 Score:   PHQ-2 ( 1999 Pf[employer]) 2/22/2022   Q1: Little interest or pleasure in doing things 1   Q2: Feeling down, depressed or hopeless 2   PHQ-2 Score 3   PHQ-2 Total Score (12-17 Years)- Positive if 3 or more points; Administer PHQ-A if positive -   Q1: Little interest or pleasure in doing things Several days   Q2: Feeling down, depressed or hopeless More than half the days   PHQ-2 Score 3       Abuse: Current or Past(Physical, Sexual or Emotional)- No  Do you feel safe in your environment? Yes    Have you ever done Advance Care Planning? (For example, a Health Directive, POLST, or a discussion with a medical provider or your loved ones about your wishes): Yes, patient states has an Advance Care Planning document and will bring a copy to the clinic.    Social History     Tobacco Use     Smoking status: Former Smoker     Smokeless tobacco: Never Used   Substance Use Topics     Alcohol use: No     Alcohol/week: 16.7 standard drinks     Types: 20 Cans of beer per  "week     Comment: 40 drinks a week , quit drinking 6 weeks ago      Quit smoking/drinking 4 years ago.     Alcohol Use 2/22/2022   Prescreen: >3 drinks/day or >7 drinks/week? Not Applicable   Prescreen: >3 drinks/day or >7 drinks/week? -       Last PSA:   PSA Tumor Marker   Date Value Ref Range Status   12/02/2021 0.68 0.00 - 4.00 ug/L Final       Reviewed orders with patient. Reviewed health maintenance and updated orders accordingly - Yes  Labs reviewed in EPIC    Reviewed and updated as needed this visit by clinical staff   Tobacco  Allergies  Meds              Reviewed and updated as needed this visit by Provider      Review of Systems   Constitutional: Negative for chills and fever.   HENT: Negative for congestion, ear pain, hearing loss and sore throat.    Eyes: Negative for pain and visual disturbance.   Respiratory: Negative for cough and shortness of breath.    Cardiovascular: Negative for chest pain, palpitations and peripheral edema.   Gastrointestinal: Negative for abdominal pain, constipation, diarrhea, heartburn, hematochezia and nausea.   Genitourinary: Negative for dysuria, frequency, genital sores, hematuria, impotence, penile discharge and urgency.   Musculoskeletal: Positive for myalgias. Negative for arthralgias and joint swelling.   Skin: Negative for rash.   Neurological: Positive for weakness. Negative for dizziness, headaches and paresthesias.   Psychiatric/Behavioral: Negative for mood changes. The patient is nervous/anxious.    First steroid end of 2021. Since then he has had 1-2 times daily muscle spasms. Since then he has taking potassium supplements. He is getting muscle spasms in lower extremities. He started taking supplements end of last week. Two days ago he did not have spasms.     OBJECTIVE:   /72   Pulse 85   Resp 18   Ht 1.854 m (6' 1\")   Wt 88 kg (194 lb)   SpO2 98%   BMI 25.60 kg/m      Physical Exam  GENERAL: healthy, alert and no distress  EYES: Eyes grossly " normal to inspection, PERRL and conjunctivae and sclerae normal  HENT: ear canals and TM's normal, nose and mouth without ulcers or lesions  NECK: no adenopathy, no asymmetry, masses, or scars and thyroid normal to palpation  RESP: lungs clear to auscultation - no rales, rhonchi or wheezes  CV: regular rate and rhythm, normal S1 S2, no S3 or S4, no murmur, click or rub, no peripheral edema and peripheral pulses strong  ABDOMEN: soft, nontender, no hepatosplenomegaly, no masses and bowel sounds normal  MS: no gross musculoskeletal defects noted, no edema  SKIN: no suspicious lesions or rashes  NEURO: Normal strength and tone, mentation intact and speech normal  PSYCH: mentation appears normal, affect normal/bright    Diagnostic Test Results:  Labs reviewed in Epic    ASSESSMENT/PLAN:     Routine general medical examination at a health care facility    Declined pneumonia vaccine     Insomnia, unspecified type  PHQ 5/8/2018 5/8/2018 2/22/2022   PHQ-9 Total Score 6 6 5   Q9: Thoughts of better off dead/self-harm past 2 weeks Not at all Not at all Not at all     - [place]  - no concerns  - uses up to 3 times/week, below script for six months   - controlled substance agreement scanned into chart   - temazepam (RESTORIL) 15 MG capsule; Take 1/2 to 1 tablet PO at bedtime as needed for sleep  Dispense: 72 capsule; Refill: 0  - urine drug screen at next visit    Alcohol dependence in remission (HCC)    Chronic bilateral low back pain without sciatica, Perineal pain  - followed by pain clinic     Muscle spasm  - Symptoms occurred after steroid back injection and mildly improving with potassium supplement. Ordered below to r/u electrolyte abnormality vs thyroid etiology vs other. tx as indicated. If below testing is normal then advised to discuss with pain provider.   - CK total; Future  - TSH with free T4 reflex; Future  - Vitamin D Deficiency; Future  - CK total  - TSH with free T4 reflex  - Vitamin D  "Deficiency    Encounter for therapeutic drug monitoring  - CBC with platelets; Future  - Comprehensive metabolic panel (BMP + Alb, Alk Phos, ALT, AST, Total. Bili, TP); Future  - CBC with platelets  - Comprehensive metabolic panel (BMP + Alb, Alk Phos, ALT, AST, Total. Bili, TP)    Lipid screening  - Lipid Profile (Chol, Trig, HDL, LDL calc); Future  - Lipid Profile (Chol, Trig, HDL, LDL calc)    Screening for prostate cancer  - up to date on screening    Colon cancer screening  - up to date on screening         Patient has been advised of split billing requirements and indicates understanding: Yes    COUNSELING:   Reviewed preventive health counseling, as reflected in patient instructions    Estimated body mass index is 25.6 kg/m  as calculated from the following:    Height as of this encounter: 1.854 m (6' 1\").    Weight as of this encounter: 88 kg (194 lb).         He reports that he has quit smoking. He has never used smokeless tobacco.      Counseling Resources:  ATP IV Guidelines  Pooled Cohorts Equation Calculator  FRAX Risk Assessment  ICSI Preventive Guidelines  Dietary Guidelines for Americans, 2010  USDA's MyPlate  ASA Prophylaxis  Lung CA Screening    Harleen Ny MD  [facility]  "

## 2022-02-23 LAB
ALBUMIN SERPL-MCNC: 3.7 G/DL (ref 3.4–5)
ALP SERPL-CCNC: 51 U/L (ref 40–150)
ALT SERPL W P-5'-P-CCNC: 31 U/L (ref 0–70)
ANION GAP SERPL CALCULATED.3IONS-SCNC: 8 MMOL/L (ref 3–14)
AST SERPL W P-5'-P-CCNC: 17 U/L (ref 0–45)
BILIRUB SERPL-MCNC: 0.8 MG/DL (ref 0.2–1.3)
BUN SERPL-MCNC: 12 MG/DL (ref 7–30)
CALCIUM SERPL-MCNC: 8.8 MG/DL (ref 8.5–10.1)
CHLORIDE BLD-SCNC: 102 MMOL/L (ref 94–109)
CHOLEST SERPL-MCNC: 180 MG/DL
CK SERPL-CCNC: 158 U/L (ref 30–300)
CO2 SERPL-SCNC: 26 MMOL/L (ref 20–32)
CREAT SERPL-MCNC: 0.92 MG/DL (ref 0.66–1.25)
DEPRECATED CALCIDIOL+CALCIFEROL SERPL-MC: 30 UG/L (ref 20–75)
FASTING STATUS PATIENT QL REPORTED: NO
GFR SERPL CREATININE-BSD FRML MDRD: >90 ML/MIN/1.73M2
GLUCOSE BLD-MCNC: 95 MG/DL (ref 70–99)
HDLC SERPL-MCNC: 53 MG/DL
LDLC SERPL CALC-MCNC: 98 MG/DL
NONHDLC SERPL-MCNC: 127 MG/DL
POTASSIUM BLD-SCNC: 4.1 MMOL/L (ref 3.4–5.3)
PROT SERPL-MCNC: 6.7 G/DL (ref 6.8–8.8)
SODIUM SERPL-SCNC: 136 MMOL/L (ref 133–144)
TRIGL SERPL-MCNC: 146 MG/DL
TSH SERPL DL<=0.005 MIU/L-ACNC: 1.5 MU/L (ref 0.4–4)

## 2022-02-23 ASSESSMENT — PATIENT HEALTH QUESTIONNAIRE - PHQ9: SUM OF ALL RESPONSES TO PHQ QUESTIONS 1-9: 5

## 2022-03-04 ENCOUNTER — TRANSFERRED RECORDS (OUTPATIENT)
Dept: HEALTH INFORMATION MANAGEMENT | Facility: CLINIC | Age: 47
End: 2022-03-04
Payer: COMMERCIAL

## 2022-03-07 ENCOUNTER — TRANSCRIBE ORDERS (OUTPATIENT)
Dept: OTHER | Age: 47
End: 2022-03-07
Payer: COMMERCIAL

## 2022-03-07 DIAGNOSIS — M51.369 OTHER INTERVERTEBRAL DISC DEGENERATION, LUMBAR REGION: Primary | ICD-10-CM

## 2022-03-07 DIAGNOSIS — M48.061 SPINAL STENOSIS, LUMBAR REGION, WITHOUT NEUROGENIC CLAUDICATION: ICD-10-CM

## 2022-03-07 DIAGNOSIS — M51.26 OTHER INTERVERTEBRAL DISC DISPLACEMENT, LUMBAR REGION: ICD-10-CM

## 2022-03-07 DIAGNOSIS — M54.16 RADICULOPATHY, LUMBAR REGION: ICD-10-CM

## 2022-05-13 PROBLEM — R10.2 PERINEAL PAIN: Status: RESOLVED | Noted: 2022-02-22 | Resolved: 2022-05-13

## 2022-05-13 PROBLEM — M54.50 CHRONIC BILATERAL LOW BACK PAIN WITHOUT SCIATICA: Status: RESOLVED | Noted: 2022-02-22 | Resolved: 2022-05-13

## 2022-05-13 PROBLEM — G89.29 CHRONIC BILATERAL LOW BACK PAIN WITHOUT SCIATICA: Status: RESOLVED | Noted: 2022-02-22 | Resolved: 2022-05-13

## 2022-05-25 ENCOUNTER — MYC MEDICAL ADVICE (OUTPATIENT)
Dept: FAMILY MEDICINE | Facility: CLINIC | Age: 47
End: 2022-05-25
Payer: COMMERCIAL

## 2022-05-25 DIAGNOSIS — M51.369 DDD (DEGENERATIVE DISC DISEASE), LUMBAR: Primary | ICD-10-CM

## 2022-05-27 NOTE — TELEPHONE ENCOUNTER
Spoke with Pt and informed him Dr. Frankie Puente is located at the [facility] in [place], [place].     Pt didn't know Dr. Puente was located in [place] and therefore, would like a Physiatrist Referral for Degenerative Disk Disorder to see  Dr. Sheeba Lorenzo at [facility] [address] [place] [address]  Appt Line: [phone]  Fax Line: [phone]    VIC Roto [facility] Referral Rep

## 2022-06-10 ENCOUNTER — LAB (OUTPATIENT)
Dept: LAB | Facility: CLINIC | Age: 47
End: 2022-06-10
Payer: COMMERCIAL

## 2022-06-10 DIAGNOSIS — M51.44 SCHMORL'S NODES OF THE THORACIC REGION: ICD-10-CM

## 2022-06-10 DIAGNOSIS — M54.50 LOWER BACK PAIN: Primary | ICD-10-CM

## 2022-06-10 LAB
BASOPHILS # BLD AUTO: 0 10E3/UL (ref 0–0.2)
BASOPHILS NFR BLD AUTO: 1 %
EOSINOPHIL # BLD AUTO: 0.1 10E3/UL (ref 0–0.7)
EOSINOPHIL NFR BLD AUTO: 2 %
ERYTHROCYTE [DISTWIDTH] IN BLOOD BY AUTOMATED COUNT: 11.9 % (ref 10–15)
ERYTHROCYTE [SEDIMENTATION RATE] IN BLOOD BY WESTERGREN METHOD: 3 MM/HR (ref 0–15)
HCT VFR BLD AUTO: 42.9 % (ref 40–53)
HGB BLD-MCNC: 14.8 G/DL (ref 13.3–17.7)
IMM GRANULOCYTES # BLD: 0 10E3/UL
IMM GRANULOCYTES NFR BLD: 0 %
LYMPHOCYTES # BLD AUTO: 1.7 10E3/UL (ref 0.8–5.3)
LYMPHOCYTES NFR BLD AUTO: 35 %
MCH RBC QN AUTO: 29.6 PG (ref 26.5–33)
MCHC RBC AUTO-ENTMCNC: 34.5 G/DL (ref 31.5–36.5)
MCV RBC AUTO: 86 FL (ref 78–100)
MONOCYTES # BLD AUTO: 0.3 10E3/UL (ref 0–1.3)
MONOCYTES NFR BLD AUTO: 6 %
NEUTROPHILS # BLD AUTO: 2.8 10E3/UL (ref 1.6–8.3)
NEUTROPHILS NFR BLD AUTO: 58 %
PLATELET # BLD AUTO: 241 10E3/UL (ref 150–450)
RBC # BLD AUTO: 5 10E6/UL (ref 4.4–5.9)
WBC # BLD AUTO: 4.8 10E3/UL (ref 4–11)

## 2022-06-10 PROCEDURE — 80076 HEPATIC FUNCTION PANEL: CPT

## 2022-06-10 PROCEDURE — 85652 RBC SED RATE AUTOMATED: CPT

## 2022-06-10 PROCEDURE — 86431 RHEUMATOID FACTOR QUANT: CPT

## 2022-06-10 PROCEDURE — 85025 COMPLETE CBC W/AUTO DIFF WBC: CPT

## 2022-06-10 PROCEDURE — 81374 HLA I TYPING 1 ANTIGEN LR: CPT

## 2022-06-10 PROCEDURE — 82565 ASSAY OF CREATININE: CPT

## 2022-06-10 PROCEDURE — 36415 COLL VENOUS BLD VENIPUNCTURE: CPT

## 2022-06-10 PROCEDURE — 82306 VITAMIN D 25 HYDROXY: CPT

## 2022-06-10 PROCEDURE — 84520 ASSAY OF UREA NITROGEN: CPT

## 2022-06-11 LAB
ALBUMIN SERPL-MCNC: 4 G/DL (ref 3.4–5)
ALP SERPL-CCNC: 53 U/L (ref 40–150)
ALT SERPL W P-5'-P-CCNC: 32 U/L (ref 0–70)
AST SERPL W P-5'-P-CCNC: 24 U/L (ref 0–45)
BILIRUB DIRECT SERPL-MCNC: 0.2 MG/DL (ref 0–0.2)
BILIRUB SERPL-MCNC: 1.2 MG/DL (ref 0.2–1.3)
BUN SERPL-MCNC: 17 MG/DL (ref 7–30)
CREAT SERPL-MCNC: 0.82 MG/DL (ref 0.66–1.25)
GFR SERPL CREATININE-BSD FRML MDRD: >90 ML/MIN/1.73M2
PROT SERPL-MCNC: 7.1 G/DL (ref 6.8–8.8)

## 2022-06-13 LAB
DEPRECATED CALCIDIOL+CALCIFEROL SERPL-MC: 35 UG/L (ref 20–75)
RHEUMATOID FACT SER NEPH-ACNC: <6 IU/ML

## 2022-06-16 LAB
B LOCUS: NORMAL
B27TEST METHOD: NORMAL

## 2022-07-01 ENCOUNTER — OFFICE VISIT (OUTPATIENT)
Dept: URGENT CARE | Facility: URGENT CARE | Age: 47
End: 2022-07-01
Payer: COMMERCIAL

## 2022-07-01 VITALS
WEIGHT: 185 LBS | TEMPERATURE: 98.4 F | RESPIRATION RATE: 16 BRPM | BODY MASS INDEX: 24.52 KG/M2 | HEIGHT: 73 IN | OXYGEN SATURATION: 99 % | SYSTOLIC BLOOD PRESSURE: 137 MMHG | HEART RATE: 78 BPM | DIASTOLIC BLOOD PRESSURE: 84 MMHG

## 2022-07-01 DIAGNOSIS — J01.00 SUBACUTE MAXILLARY SINUSITIS: ICD-10-CM

## 2022-07-01 DIAGNOSIS — J06.9 UPPER RESPIRATORY TRACT INFECTION, UNSPECIFIED TYPE: ICD-10-CM

## 2022-07-01 DIAGNOSIS — J20.9 ACUTE BRONCHITIS, UNSPECIFIED ORGANISM: Primary | ICD-10-CM

## 2022-07-01 LAB
DEPRECATED S PYO AG THROAT QL EIA: NEGATIVE
GROUP A STREP BY PCR: NOT DETECTED
SARS-COV-2 RNA RESP QL NAA+PROBE: NEGATIVE

## 2022-07-01 PROCEDURE — 99213 OFFICE O/P EST LOW 20 MIN: CPT | Performed by: EMERGENCY MEDICINE

## 2022-07-01 PROCEDURE — U0005 INFEC AGEN DETEC AMPLI PROBE: HCPCS | Performed by: EMERGENCY MEDICINE

## 2022-07-01 PROCEDURE — U0003 INFECTIOUS AGENT DETECTION BY NUCLEIC ACID (DNA OR RNA); SEVERE ACUTE RESPIRATORY SYNDROME CORONAVIRUS 2 (SARS-COV-2) (CORONAVIRUS DISEASE [COVID-19]), AMPLIFIED PROBE TECHNIQUE, MAKING USE OF HIGH THROUGHPUT TECHNOLOGIES AS DESCRIBED BY CMS-2020-01-R: HCPCS | Performed by: EMERGENCY MEDICINE

## 2022-07-01 PROCEDURE — 87651 STREP A DNA AMP PROBE: CPT | Performed by: EMERGENCY MEDICINE

## 2022-07-01 RX ORDER — TRAMADOL HYDROCHLORIDE 50 MG/1
50 TABLET ORAL EVERY 6 HOURS PRN
COMMUNITY

## 2022-07-01 RX ORDER — FLUTICASONE PROPIONATE 50 MCG
1 SPRAY, SUSPENSION (ML) NASAL DAILY
Qty: 9.9 ML | Refills: 0 | Status: SHIPPED | OUTPATIENT
Start: 2022-07-01 | End: 2022-07-22

## 2022-07-01 NOTE — PROGRESS NOTES
Assessment & Plan     Diagnosis:      (J20.9) Acute bronchitis, unspecified organism (primary encounter diagnosis)  Plan: fluticasone (ARNUITY ELLIPTA) 50 MCG/ACT         inhaler, fluticasone (FLONASE) 50 MCG/ACT nasal        Spray    (J06.9) Upper respiratory tract infection, unspecified type     (J01.00) Subacute maxillary sinusitis  Plan: amoxicillin-clavulanate (AUGMENTIN) 875-125 MG         tablet    Medical Decision Making:  Jaun Lobo is a 47 year old male who presents for evaluation of persistent cough x2 weeks, sinus/facial pressure, sore throat, rhinorrhea and headache.  This is consistent with an upper respiratory tract infection.  Rapid strep test is negative; PCR pending.    The patient presented with signs and symptoms of sinusitis and bronchitis.  Patient states that he had an extensive work-up done last week in [place], had a chest x-ray, multiple blood panels, EKG and other tests done which were all negative.  He is discharged with codeine cough syrup and Tessalon Perles notes has been helping slight with the cough but he continues.     Based on history and exam, suspect patient has likely viral bronchitis and sinusitis.  However, given duration of symptoms >10 days, minimal improvement with multiple medications for symptoms at home, including allergies to medications, I did prescribe Augmentin in case of bacterial etiology; COVID-19 test is pending at this time and if this is positive he does not need to initiate antibiotics.  Evaluation today did not show signs of intracranial complication of sinusitis, facial cellulitis or fungal sinusitis.  She was also provided inhaled steroid spray and inhaler to help with sinus edema and bronchospasm.      Given clear lungs, fever curve, no hypoxia and no respiratory distress I do not feel the patient needs a CXR at this point as the probability of bacterial pneumonia is very unlikely.       The patient was given instructions to follow up with primary  "care in 3-5 days.  Instructions for symptomatic care were given.       BRAD Liu [facility]    Subjective     Jaun Lobo is a 47 year old male who presents to clinic today for the following health issues:  Chief Complaint   Patient presents with     Urgent Care     Pharyngitis     Cough     X2 weeks, sinus infection, headache. Pt was on codeine cough syrup, finished in Tuesday. Benzonatate was taking too, but not really helping.     Cold Symptoms     General cold symptom. PCR Covid negative on the 25th [place]. Chest x-ray was done too, no pneumonia.        HPI    Onset of symptoms was 2 week(s) ago.  Course of illness is waxing and waning.  Worse over the last 48 hours.  Severity moderate  Current and Associated symptoms: stuffy nose, cough - non-productive, shortness of breath, ear pain bilateral, sore throat, facial pain/pressure, headache and not sleeping well  Denies fever, hoarse voice, vomiting, diarrhea, not eating and taking in fluids?  Yes  Treatment measures tried include Tylenol/Ibuprofen, Decongestants and cough syrup with codeine, Tessalon Perles and Zyrtec.  Predisposing factors include seasonal allergies.    Patient denies any chest pain, shortness of breath while at rest or with light activity, leg swelling, nausea, vomiting or difficulties swallowing food/fluids at this time.     He notes he was in [place] a week ago with the symptoms and was seen in the ER, states he had a \"$10,000 work-up\" including an EKG, chest x-ray and a bunch of blood work which all was reassuring.  He was sent out with codeine cough syrup which helped with his cough for a few days, notes he is out of this now.  He is concerned about the persistent cough keeping him up at night.    Review of Systems    See HPI    Objective      Vitals: /84   Pulse 78   Temp 98.4  F (36.9  C) (Temporal)   Resp 16   Ht 1.854 m (6' 1\")   Wt 83.9 kg (185 lb)   SpO2 99%   BMI 24.41 kg/m  " "      Patient Vitals for the past 24 hrs:   BP Temp Temp src Pulse Resp SpO2 Height Weight   07/01/22 1020 137/84 98.4  F (36.9  C) Temporal 78 16 99 % 1.854 m (6' 1\") 83.9 kg (185 lb)       Vital signs reviewed by: Sujit Ibarra PA-C    Physical Exam   Constitutional: Alert and active. ildo acute distress.  Non-toxic appearing.   HENT:   Right Ear: External ear normal. TM: pale/grey  Left Ear: External ear normal. TM: pale/grey  Nose: Nasal turbinates are erythematous and edematous; yellow rhinorrhea noted.  No septal mass or purulent discharge.  No epistaxis.  Maxillary sinus tenderness to percussion.  Eyes: Conjunctivae, EOM and lids are normal.   Mouth: Mucous membranes are moist. Posterior oropharynx is clear. No exudates. Normal tongue and tonsil. Uvula is midline. No submandibular swelling or erythema.  Neck: Normal ROM. No meningismus  Cardiovascular: Regular rate and rhythm  Pulmonary/Chest: Effort normal. No respiratory distress. Lungs are clear to auscultation throughout. Occsaional cough.  GI: Abdomen is soft and non-tender throughout.   Musculoskeletal: Normal range of motion. No lower extremity tenderness or asymmetric edema.  Neurological: Alert and oriented x3.  Skin: No rash noted on visualized skin.       Labs/Imaging:  Results for orders placed or performed in visit on 07/01/22   Streptococcus A Rapid Screen w/Reflex to PCR - Clinic Collect     Status: Normal    Specimen: Throat; Swab   Result Value Ref Range    Group A Strep antigen Negative Negative         Sujit Ibarra PA-C, July 1, 2022                        "

## 2022-07-15 ENCOUNTER — ANCILLARY PROCEDURE (OUTPATIENT)
Dept: BONE DENSITY | Facility: CLINIC | Age: 47
End: 2022-07-15
Payer: COMMERCIAL

## 2022-07-15 DIAGNOSIS — L04.9 NECROTIZING INFLAMMATION OF LYMPH NODE: ICD-10-CM

## 2022-07-15 DIAGNOSIS — M54.50 LOW BACK PAIN: ICD-10-CM

## 2022-07-15 PROCEDURE — 77080 DXA BONE DENSITY AXIAL: CPT

## 2022-07-22 ENCOUNTER — OFFICE VISIT (OUTPATIENT)
Dept: FAMILY MEDICINE | Facility: CLINIC | Age: 47
End: 2022-07-22
Payer: COMMERCIAL

## 2022-07-22 VITALS
OXYGEN SATURATION: 99 % | HEART RATE: 83 BPM | BODY MASS INDEX: 23.81 KG/M2 | SYSTOLIC BLOOD PRESSURE: 131 MMHG | RESPIRATION RATE: 18 BRPM | HEIGHT: 74 IN | TEMPERATURE: 97.2 F | WEIGHT: 185.5 LBS | DIASTOLIC BLOOD PRESSURE: 74 MMHG

## 2022-07-22 DIAGNOSIS — R05.9 COUGH: ICD-10-CM

## 2022-07-22 DIAGNOSIS — J45.20 MILD INTERMITTENT ASTHMA WITHOUT COMPLICATION: ICD-10-CM

## 2022-07-22 DIAGNOSIS — G47.00 INSOMNIA, UNSPECIFIED TYPE: ICD-10-CM

## 2022-07-22 DIAGNOSIS — J06.9 UPPER RESPIRATORY TRACT INFECTION, UNSPECIFIED TYPE: Primary | ICD-10-CM

## 2022-07-22 LAB
AMPHETAMINES UR QL: NOT DETECTED
BARBITURATES UR QL SCN: NOT DETECTED
BENZODIAZ UR QL SCN: DETECTED
BUPRENORPHINE UR QL: NOT DETECTED
CANNABINOIDS UR QL: NOT DETECTED
COCAINE UR QL SCN: NOT DETECTED
D-METHAMPHET UR QL: NOT DETECTED
METHADONE UR QL SCN: NOT DETECTED
OPIATES UR QL SCN: NOT DETECTED
OXYCODONE UR QL SCN: NOT DETECTED
PCP UR QL SCN: NOT DETECTED
PROPOXYPH UR QL: NOT DETECTED
TRICYCLICS UR QL SCN: NOT DETECTED

## 2022-07-22 PROCEDURE — 80306 DRUG TEST PRSMV INSTRMNT: CPT | Performed by: FAMILY MEDICINE

## 2022-07-22 PROCEDURE — 99214 OFFICE O/P EST MOD 30 MIN: CPT | Performed by: FAMILY MEDICINE

## 2022-07-22 RX ORDER — ALBUTEROL SULFATE 90 UG/1
2 AEROSOL, METERED RESPIRATORY (INHALATION) EVERY 4 HOURS PRN
Qty: 18 G | Refills: 1 | Status: SHIPPED | OUTPATIENT
Start: 2022-07-22

## 2022-07-22 RX ORDER — FLUTICASONE PROPIONATE 50 MCG
2 SPRAY, SUSPENSION (ML) NASAL DAILY
Qty: 16 G | Refills: 0 | Status: SHIPPED | OUTPATIENT
Start: 2022-07-22 | End: 2022-08-17

## 2022-07-22 RX ORDER — BENZONATATE 200 MG/1
200 CAPSULE ORAL 3 TIMES DAILY PRN
Qty: 30 CAPSULE | Refills: 0 | Status: SHIPPED | OUTPATIENT
Start: 2022-07-22 | End: 2022-08-15

## 2022-07-22 RX ORDER — MONTELUKAST SODIUM 10 MG/1
10 TABLET ORAL AT BEDTIME
Qty: 30 TABLET | Refills: 0 | Status: SHIPPED | OUTPATIENT
Start: 2022-07-22 | End: 2022-08-15

## 2022-07-22 RX ORDER — TEMAZEPAM 15 MG/1
CAPSULE ORAL
Qty: 72 CAPSULE | Refills: 0 | Status: SHIPPED | OUTPATIENT
Start: 2022-08-22 | End: 2023-03-03

## 2022-07-22 NOTE — PROGRESS NOTES
Assessment & Plan     1. Upper respiratory tract infection, unspecified type  - fluticasone (FLONASE) 50 MCG/ACT nasal spray; Spray 2 sprays into both nostrils daily  Dispense: 16 g; Refill: 0  - benzonatate (TESSALON) 200 MG capsule; Take 1 capsule (200 mg) by mouth 3 times daily as needed for cough  Dispense: 30 capsule; Refill: 0    2. Mild intermittent asthma without complication  - albuterol (PROAIR HFA/PROVENTIL HFA/VENTOLIN HFA) 108 (90 Base) MCG/ACT inhaler; Inhale 2 puffs into the lungs every 4 hours as needed for shortness of breath / dyspnea or wheezing  Dispense: 18 g; Refill: 1  - montelukast (SINGULAIR) 10 MG tablet; Take 1 tablet (10 mg) by mouth At Bedtime  Dispense: 30 tablet; Refill: 0    3. Insomnia, unspecified type  - temazepam (RESTORIL) 15 MG capsule; Take 1/2 to 1 tablet PO at bedtime as needed for sleep (Patient taking differently: 15 mg Take 1/2 to 1 tablet PO at bedtime as needed for sleep PT NEEDS 15 MG)  Dispense: 72 capsule; Refill: 0  - Drug Abuse Screen Panel 13, Urine (Pain Care Package) - lab collect; Future  - Drug Abuse Screen Panel 13, Urine (Pain Care Package) - lab collect    4. Cough  - Adult Pulmonary Medicine Referral; Future      Deqa Maria Luz Ny MD  [facility]    Dayanna Zamorano is a 47 year old, presenting for the following health issues:  No chief complaint on file.      History of Present Illness     Asthma:  He presents for follow up of asthma.  He has some cough, some wheezing, and no shortness of breath. He is using a relief medication daily. He does not miss any doses of his controller medication throughout the week.Patient is aware of the following triggers: animal dander, dust mites, insects/rodents, mold and pollens. The patient has not had a visit to the Emergency Room, Urgent Care or Hospital due to asthma since the last clinic visit.     Reason for visit:  Insomnia follow up and cough/asthma    He eats 4 or more servings of  fruits and vegetables daily.He consumes 0 sweetened beverage(s) daily.He exercises with enough effort to increase his heart rate 20 to 29 minutes per day.  He exercises with enough effort to increase his heart rate 5 days per week.   He is taking medications regularly.       Acute Illness  Acute illness concerns: cough  Onset/Duration: 5 weeks ago  Symptoms:  Fever: No  Chills/Sweats: No  Headache (location?): No  Sinus Pressure: no   Conjunctivitis:  No  Ear Pain: no  Rhinorrhea: YES, mild   Congestion: YES, mild   Sore Throat: no; he endorses PND  Cough: YES  Wheeze: YES, mild   Decreased Appetite: No  Nausea: No  Vomiting: No  Diarrhea: No  Dysuria/Freq.: No  Dysuria or Hematuria: No  Fatigue/Achiness: No  Sick/Strep Exposure: No  Therapies tried and outcome: Robitussin and inhaler and albuteral helps the cough    Was treated for sinus infection and bronchitis on 7/1/22 - Augmentin and Flonase   No GERD.  Over the counter cough medicine do not help.   The flonase has helped.   The arnuity has not helped.   Over the last few nights, he tried his albuterol inhaler which helped to get sleep.   He suspects that his asthma was triggered by pollen.   Cough has been horrible.   He is using arnuity one puff once daily.  He has tried two different OTC allergy medication - Claritin, Zyrtec. He feels that claritin mildly helped.   Cough is dry.     He is being seen by [facility] clinic for back pain. He is on tramadol. Not taking xanax, he took it after last epidural injection.    He takes temazepam 2-3 times/week.     Review of Systems         Objective    There were no vitals taken for this visit.  There is no height or weight on file to calculate BMI.  Physical Exam                       .  ..

## 2022-07-25 ENCOUNTER — TELEPHONE (OUTPATIENT)
Dept: FAMILY MEDICINE | Facility: CLINIC | Age: 47
End: 2022-07-25

## 2022-07-25 NOTE — TELEPHONE ENCOUNTER
Seen you 7/22/2022 and you prescribed temazepam (RESTORIL) 15 MG capsule Take 1/2 to 1 tablet PO at bedtime as needed for sleep. I asked the if they can change this to tablets and they said no seeing it is a controlled substance.   I do not see any tablets to choose from. Please send in new script.  thank you

## 2022-07-27 ENCOUNTER — MYC MEDICAL ADVICE (OUTPATIENT)
Dept: FAMILY MEDICINE | Facility: CLINIC | Age: 47
End: 2022-07-27

## 2022-07-27 DIAGNOSIS — R05.9 COUGH: ICD-10-CM

## 2022-07-27 DIAGNOSIS — M45.9 ANKYLOSING SPONDYLITIS, UNSPECIFIED SITE OF SPINE (H): Primary | ICD-10-CM

## 2022-07-28 RX ORDER — LORAZEPAM 0.5 MG/1
0.5 TABLET ORAL DAILY PRN
Qty: 5 TABLET | Refills: 0 | Status: SHIPPED | OUTPATIENT
Start: 2022-07-28 | End: 2022-08-15

## 2022-07-28 RX ORDER — PREDNISONE 20 MG/1
20 TABLET ORAL DAILY
Qty: 5 TABLET | Refills: 0 | Status: SHIPPED | OUTPATIENT
Start: 2022-07-28 | End: 2022-08-02

## 2022-07-28 NOTE — TELEPHONE ENCOUNTER
Referral signed rheumatologist and pulmonary provider.   Discuss pt cough symptoms. Discussed prednisone, due to side effects pt was cautious to use medication. Due to irritability and road rage issues, usually prescribed BZ with prednisone use. I discussed low dose ativan 0.5 mg daily while on prednisone. Do not take with tramadol or Restoril. Advised to not take ativan with temazepam or restoril. I prescribed narcan.  DM

## 2022-08-14 DIAGNOSIS — J45.20 MILD INTERMITTENT ASTHMA WITHOUT COMPLICATION: ICD-10-CM

## 2022-08-14 DIAGNOSIS — J06.9 UPPER RESPIRATORY TRACT INFECTION, UNSPECIFIED TYPE: ICD-10-CM

## 2022-08-15 ENCOUNTER — VIRTUAL VISIT (OUTPATIENT)
Dept: FAMILY MEDICINE | Facility: CLINIC | Age: 47
End: 2022-08-15
Payer: COMMERCIAL

## 2022-08-15 DIAGNOSIS — U07.1 INFECTION DUE TO 2019 NOVEL CORONAVIRUS: Primary | ICD-10-CM

## 2022-08-15 DIAGNOSIS — J45.20 MILD INTERMITTENT ASTHMA WITHOUT COMPLICATION: ICD-10-CM

## 2022-08-15 PROCEDURE — 99213 OFFICE O/P EST LOW 20 MIN: CPT | Mod: CS | Performed by: STUDENT IN AN ORGANIZED HEALTH CARE EDUCATION/TRAINING PROGRAM

## 2022-08-15 RX ORDER — TEMAZEPAM 7.5 MG/1
CAPSULE ORAL
COMMUNITY
Start: 2022-05-12 | End: 2023-04-27

## 2022-08-15 ASSESSMENT — ASTHMA QUESTIONNAIRES
ACT_TOTALSCORE: 12
QUESTION_1 LAST FOUR WEEKS HOW MUCH OF THE TIME DID YOUR ASTHMA KEEP YOU FROM GETTING AS MUCH DONE AT WORK, SCHOOL OR AT HOME: SOME OF THE TIME
QUESTION_2 LAST FOUR WEEKS HOW OFTEN HAVE YOU HAD SHORTNESS OF BREATH: THREE TO SIX TIMES A WEEK
QUESTION_4 LAST FOUR WEEKS HOW OFTEN HAVE YOU USED YOUR RESCUE INHALER OR NEBULIZER MEDICATION (SUCH AS ALBUTEROL): ONE OR TWO TIMES PER DAY
ACT_TOTALSCORE: 12
QUESTION_5 LAST FOUR WEEKS HOW WOULD YOU RATE YOUR ASTHMA CONTROL: SOMEWHAT CONTROLLED
QUESTION_3 LAST FOUR WEEKS HOW OFTEN DID YOUR ASTHMA SYMPTOMS (WHEEZING, COUGHING, SHORTNESS OF BREATH, CHEST TIGHTNESS OR PAIN) WAKE YOU UP AT NIGHT OR EARLIER THAN USUAL IN THE MORNING: FOUR OR MORE NIGHTS A WEEK
ACUTE_EXACERBATION_TODAY: NO

## 2022-08-15 NOTE — PROGRESS NOTES
Jaun is a 47 year old who is being evaluated via a billable telephone visit.      What phone number would you like to be contacted at? [phone]  How would you like to obtain your AVS? MyChart    Assessment & Plan     47 year male with relevant PMH of asthma which places him at higher risk despite MASSBP score of 0.  Qualifies for Paxlovid and will place prescription.  No drug drug interactions.  Discussed side effects.    1. Infection due to 2019 novel coronavirus  - nirmatrelvir and ritonavir (PAXLOVID) therapy pack; Take 3 tablets by mouth 2 times daily for 5 days  Dispense: 30 tablet; Refill: 0    2. Mild intermittent asthma without complication  - nirmatrelvir and ritonavir (PAXLOVID) therapy pack; Take 3 tablets by mouth 2 times daily for 5 days  Dispense: 30 tablet; Refill: 0    No follow-ups on file.    Cody Leyva MD  [facility]   Jaun is a 47 year old, presenting for the following health issues:  Covid Concern (Covid treatment. Tested positive on Saturday, exposed on Tuesday/wed last week. Has spinal condition/back conditions- its making condition worse. Achy, tired, low grade fever. )    Patein tells me he was exposed on last Tuesday to covid and tested positive 2 days ago on Saturday. He has had symptoms of achiness, fatigue, fevers. No SOB. Mild coughing.     BMI 24     MASSBP Score 8/15/2022   Age Greater than or equal to 65 years 0   BMI greater than or equal to 35 kg/m2 0   Has Diabetes Mellitus 0   Has Chronic Kidney Disease 0   Has Cardiovascular Disease and 55 years or older 0   Has Chronic Respiratory Disease and 55 years or older 0   Has Hypertension and 55 years or older 0   Is Immunocompromised 0   Is Pregnant 0   Member of BIPOC community (Black/, /, ,  or , or  or [place] Native)  0   MASSBP Score 0         HPI         Objective           Vitals:  No vitals were  obtained today due to virtual visit.          Start: 11:08    End: 11:25 AM    Phone call duration: 17 minutes    .  ..

## 2022-08-17 ENCOUNTER — MYC MEDICAL ADVICE (OUTPATIENT)
Dept: FAMILY MEDICINE | Facility: CLINIC | Age: 47
End: 2022-08-17

## 2022-08-17 ENCOUNTER — E-VISIT (OUTPATIENT)
Dept: URGENT CARE | Facility: CLINIC | Age: 47
End: 2022-08-17
Payer: COMMERCIAL

## 2022-08-17 DIAGNOSIS — J01.10 ACUTE NON-RECURRENT FRONTAL SINUSITIS: Primary | ICD-10-CM

## 2022-08-17 DIAGNOSIS — F41.9 ANXIETY: Primary | ICD-10-CM

## 2022-08-17 DIAGNOSIS — U07.1 INFECTION DUE TO 2019 NOVEL CORONAVIRUS: ICD-10-CM

## 2022-08-17 DIAGNOSIS — T50.905A ADVERSE EFFECT OF DRUG, INITIAL ENCOUNTER: ICD-10-CM

## 2022-08-17 PROCEDURE — 99207 PR NO CHARGE LOS: CPT | Performed by: PHYSICIAN ASSISTANT

## 2022-08-17 RX ORDER — FLUTICASONE PROPIONATE 50 MCG
SPRAY, SUSPENSION (ML) NASAL
Qty: 16 ML | Refills: 3 | Status: SHIPPED | OUTPATIENT
Start: 2022-08-17

## 2022-08-17 RX ORDER — MONTELUKAST SODIUM 10 MG/1
TABLET ORAL
Qty: 30 TABLET | Refills: 0 | OUTPATIENT
Start: 2022-08-17

## 2022-08-17 NOTE — CONFIDENTIAL NOTE
Pt had virtual visit 8/15 for positive COVID. Took one dose of Paxlovid and didn't tolerate SEs, so stopped it. Now having psychiatric/aggressive behaviors. Please advise.    Germaine Stewart RN on 8/17/2022 at 2:16 PM

## 2022-08-17 NOTE — TELEPHONE ENCOUNTER
--Montelukast discontinued 8/15/22 by  in a COVID related visit.      --Last visit:  7/22/2022 Yanely.    --Future Visit: none.    Montelukast-Message sent to pharmacy - Refusal reason: Medication has been discontinued (DISCONTINUED 8/15/22. PLEASE HAVE PT CONTACT CLINIC IF HE WANTS MED.).  Sharmin CAMPOS

## 2022-08-17 NOTE — PATIENT INSTRUCTIONS
Maxi Zamorano,     This is not a common side effect of Paxlovid that I am aware of.  I see that you also reached out to your primary care provider. I feel they would be best able to address this issue, and if your symptoms are severe or you have any suicidal/homicidal thoughts- please go to the ER.    You will not be charged for this visit.    Johanny Davis PA-C

## 2022-08-18 ENCOUNTER — VIRTUAL VISIT (OUTPATIENT)
Dept: FAMILY MEDICINE | Facility: CLINIC | Age: 47
End: 2022-08-18
Payer: COMMERCIAL

## 2022-08-18 DIAGNOSIS — F41.1 ANXIETY STATE: Primary | ICD-10-CM

## 2022-08-18 PROCEDURE — 99213 OFFICE O/P EST LOW 20 MIN: CPT | Mod: TEL | Performed by: STUDENT IN AN ORGANIZED HEALTH CARE EDUCATION/TRAINING PROGRAM

## 2022-08-18 RX ORDER — HYDROXYZINE HYDROCHLORIDE 25 MG/1
25 TABLET, FILM COATED ORAL 3 TIMES DAILY PRN
Qty: 60 TABLET | Refills: 0 | Status: SHIPPED | OUTPATIENT
Start: 2022-08-18 | End: 2023-04-27

## 2022-08-18 NOTE — PROGRESS NOTES
Jaun is a 47 year old who is being evaluated via a billable telephone visit.      What phone number would you like to be contacted at? [phone]  How would you like to obtain your AVS? MyChart    Assessment & Plan     47 year old male who presents via phone visit to discuss recent anxiety and panic attack with attempting Paxil that for COVID treatment.  We decided to put him on some hydroxyzine to use as needed for anxiety.  I encouraged him to follow-up with his PCP after to consider more long-term solutions as he is taking benzodiazepine to help with sleep approximately 3 times a week at this point.  Encouraged to call with questions    1. Anxiety state  - hydrOXYzine (ATARAX) 25 MG tablet; Take 1 tablet (25 mg) by mouth 3 times daily as needed for anxiety  Dispense: 60 tablet; Refill: 0    Cody Leyva MD  [facility]   Jaun is a 47 year old, presenting for the following health issues:  History of Present Illness (Discuss paxlovid and anxiety. Feels foggy. Anxiety has gotten better since that episode. Feels frustrated. )    HPI:  Patient tells me that on Monday night about 4 hrs after taking paxlovid I prescribed him for COVD he woke up diaphoretic, dizzy, and tinnitus. He felt like he needed to get out of the room he was in as he was feeling 'trapped'.  Scared him. He questions if this was a panic attack. He also had a metalic taste in his mouth the next day. He has felt more frustration and confusion with his sickness from COVID.  He tells me his COVID symptoms have improved with less body aches, weakness, fatigue.         Objective           Vitals:  No vitals were obtained today due to virtual visit.      Start time: 2:06 PM    End time: 2:20 PM        Phone call duration: 14 minutes    .  ..

## 2022-08-19 ENCOUNTER — E-VISIT (OUTPATIENT)
Dept: FAMILY MEDICINE | Facility: CLINIC | Age: 47
End: 2022-08-19
Payer: COMMERCIAL

## 2022-08-19 DIAGNOSIS — U07.1 INFECTION DUE TO 2019 NOVEL CORONAVIRUS: ICD-10-CM

## 2022-08-19 DIAGNOSIS — J01.00 ACUTE NON-RECURRENT MAXILLARY SINUSITIS: Primary | ICD-10-CM

## 2022-08-19 PROCEDURE — 99421 OL DIG E/M SVC 5-10 MIN: CPT | Performed by: PHYSICIAN ASSISTANT

## 2022-08-19 RX ORDER — ACETAMINOPHEN 500 MG
500-1000 TABLET ORAL EVERY 4 HOURS PRN
Qty: 30 TABLET | Refills: 0 | Status: SHIPPED | OUTPATIENT
Start: 2022-08-19 | End: 2023-04-27

## 2022-08-19 RX ORDER — CETIRIZINE HYDROCHLORIDE 10 MG/1
10 TABLET ORAL DAILY
Qty: 90 TABLET | Refills: 3 | Status: SHIPPED | OUTPATIENT
Start: 2022-08-19 | End: 2023-04-27

## 2022-08-19 RX ORDER — AMOXICILLIN 875 MG
875 TABLET ORAL 2 TIMES DAILY
Qty: 20 TABLET | Refills: 0 | Status: SHIPPED | OUTPATIENT
Start: 2022-08-19 | End: 2022-08-29

## 2022-08-19 RX ORDER — FLUTICASONE PROPIONATE 50 MCG
1 SPRAY, SUSPENSION (ML) NASAL DAILY
Qty: 11.1 G | Refills: 0 | Status: SHIPPED | OUTPATIENT
Start: 2022-08-19 | End: 2022-11-17

## 2022-08-19 NOTE — PATIENT INSTRUCTIONS
Thank you for choosing us for your care. I have placed an order for a prescription so that you can start treatment. View your full visit summary for details by clicking on the link below. Your pharmacist will able to address any questions you may have about the medication.     You have flonase and zyrtec sent for congestion and amoxicillin sent in for sinus infection from your covid infection.    If you're not feeling better within 5-7 days, please schedule an appointment.  You can schedule an appointment right here in [facility], or call [phone]  If the visit is for the same symptoms as your eVisit, we'll refund the cost of your eVisit if seen within seven days.

## 2022-09-02 ENCOUNTER — TRANSFERRED RECORDS (OUTPATIENT)
Dept: HEALTH INFORMATION MANAGEMENT | Facility: CLINIC | Age: 47
End: 2022-09-02

## 2022-09-02 DIAGNOSIS — M45.0 ANKYLOSING SPONDYLITIS OF MULTIPLE SITES IN SPINE (H): Primary | ICD-10-CM

## 2022-10-14 ENCOUNTER — HOSPITAL ENCOUNTER (OUTPATIENT)
Dept: MRI IMAGING | Facility: CLINIC | Age: 47
Discharge: HOME | End: 2022-10-14
Attending: STUDENT IN AN ORGANIZED HEALTH CARE EDUCATION/TRAINING PROGRAM
Payer: COMMERCIAL

## 2022-10-14 DIAGNOSIS — M45.0 ANKYLOSING SPONDYLITIS OF MULTIPLE SITES IN SPINE (H): ICD-10-CM

## 2022-10-14 PROCEDURE — 72158 MRI LUMBAR SPINE W/O & W/DYE: CPT

## 2022-10-14 PROCEDURE — 72197 MRI PELVIS W/O & W/DYE: CPT

## 2022-10-14 PROCEDURE — 72157 MRI CHEST SPINE W/O & W/DYE: CPT

## 2022-10-14 PROCEDURE — 255N000002 HC RX 255 OP 636: Performed by: STUDENT IN AN ORGANIZED HEALTH CARE EDUCATION/TRAINING PROGRAM

## 2022-10-14 PROCEDURE — A9585 GADOBUTROL INJECTION: HCPCS | Performed by: STUDENT IN AN ORGANIZED HEALTH CARE EDUCATION/TRAINING PROGRAM

## 2022-10-14 PROCEDURE — 72156 MRI NECK SPINE W/O & W/DYE: CPT

## 2022-10-14 RX ORDER — GADOBUTROL 604.72 MG/ML
8 INJECTION INTRAVENOUS ONCE
Status: COMPLETED | OUTPATIENT
Start: 2022-10-14 | End: 2022-10-14

## 2022-10-14 RX ADMIN — GADOBUTROL 8 ML: 604.72 INJECTION INTRAVENOUS at 09:34

## 2022-11-14 ENCOUNTER — MYC MEDICAL ADVICE (OUTPATIENT)
Dept: FAMILY MEDICINE | Facility: CLINIC | Age: 47
End: 2022-11-14

## 2022-11-14 DIAGNOSIS — J45.20 MILD INTERMITTENT ASTHMA WITHOUT COMPLICATION: Primary | ICD-10-CM

## 2022-11-16 NOTE — TELEPHONE ENCOUNTER
Patient requested refill of Singulair but was discontinued on 8/15/2022.     RN will route this encounter to Harleen Ny MD, to review and advise.       Disp Refills Start End MELVI   montelukast (SINGULAIR) 10 MG tablet (Discontinued) 30 tablet 0 7/22/2022 8/15/2022 --   Sig - Route: Take 1 tablet (10 mg) by mouth At Bedtime - Oral   Patient not taking: Reported on 8/15/2022        Sent to pharmacy as: Montelukast Sodium 10 MG Oral Tablet (SINGULAIR)   Class: E-Prescribe   Order: [id number]   E-Prescribing Status: Receipt confirmed by pharmacy (7/22/2022 11:18 AM CDT)   E-Cancel Status: Request approved by pharmacy (8/15/2022 11:15 AM CDT)       E-Cancel Status Note: Some or all of Rx received:07/23/22           Arlen Collazo RN  [facility]

## 2022-11-17 ENCOUNTER — TRANSFERRED RECORDS (OUTPATIENT)
Dept: FAMILY MEDICINE | Facility: CLINIC | Age: 47
End: 2022-11-17

## 2022-11-17 LAB
ALT SERPL-CCNC: 25 IU/L (ref 5–40)
AST SERPL-CCNC: 25 U/L (ref 5–34)
CREATININE (EXTERNAL): 0.75 MG/DL (ref 0.5–1.3)

## 2022-11-17 RX ORDER — MONTELUKAST SODIUM 10 MG/1
10 TABLET ORAL AT BEDTIME
Qty: 90 TABLET | Refills: 3 | Status: SHIPPED | OUTPATIENT
Start: 2022-11-17

## 2022-11-22 ENCOUNTER — TELEPHONE (OUTPATIENT)
Dept: PULMONOLOGY | Facility: CLINIC | Age: 47
End: 2022-11-22

## 2022-11-22 DIAGNOSIS — R05.9 COUGH: Primary | ICD-10-CM

## 2022-11-28 ENCOUNTER — TELEPHONE (OUTPATIENT)
Dept: PULMONOLOGY | Facility: CLINIC | Age: 47
End: 2022-11-28

## 2022-11-28 NOTE — TELEPHONE ENCOUNTER
Left Voicemail (2nd Attempt) for the patient to call back and schedule the following:    Appointment type: New  Provider: Carlo  Return date: 1/30/23  Specialty phone number: [phone]  Additional appointment(s) needed: CXR  Additonal Notes: na

## 2022-12-02 NOTE — TELEPHONE ENCOUNTER
Left Voicemail (2nd Attempt) for the patient to call back and schedule the following:    Appointment type: New  Provider: Carlo  Return date: 1/30/23  Specialty phone number: [phone]  Additional appointment(s) needed: CXR  Additonal Notes: Final attempt, will send pt letter.

## 2022-12-07 NOTE — TELEPHONE ENCOUNTER
RECORDS RECEIVED FROM: internal    DATE RECEIVED: 1.30.23    NOTES STATUS DETAILS   OFFICE NOTE from referring provider internal  Harleen Ny MD   OFFICE NOTE from other specialist     DISCHARGE SUMMARY from hospital     DISCHARGE REPORT from the ER     MEDICATION LIST internal     IMAGING  (NEED IMAGES AND REPORTS)     CT SCAN     CHEST XRAY (CXR) internal  Scheduled 12.12.22    TESTS     PULMONARY FUNCTION TESTING (PFT) internal    Scheduled 1.30.23

## 2022-12-09 NOTE — ED PROVIDER NOTES
History     Chief Complaint:  Back Pain    The history is provided by the patient.      Jaun Lobo is a 46 year old male with a history of DDD, Ankylosing spondylitis and alcohol abuse who presents with back pain. The patient notes that he has been having ongoing lower back pain over the last few weeks. He has been evaluated multiple times and had a MRI on 7/15, see below. The patient has been treated with solumedrol Dosepak, gabapentin, tramadol, oxycodone, Lidocaine patches and physical therapy. He returns for continued back pain. The patient states that it is a 10/10 pain.  He denies any radiation to the legs or any numbness. He notes that he often gets muscle spasms and falls to the floor. He states that he has had to stand due to pain and cannot find a comfortable position to sleep in. He feels that he cannot care for himself at home.  He states that he has not been able to urinate today secondary to pain.  He also notes that has not had a bowel movement in the last 2 days.     MR lumbar spine 7/15/2021:  1. Resolution of large right central L5-S1 disc extrusion since 2013.  No high-grade spinal canal or neural foraminal narrowing. Mild  progression in mild to moderate neural foraminal narrowing in the  lower lumbar spine.  2. Progression of Schmorl's node deformity in the upper endplate of L3  since 2013, with surrounding marrow edema suggesting that this is  recent. Suspect additional recent posterior inferior endplate  fracture/developing Schmorl's node deformity at L2.    Review of Systems   Constitutional: Positive for activity change.   Gastrointestinal: Positive for constipation.   Genitourinary: Positive for difficulty urinating.   Musculoskeletal: Positive for back pain and gait problem.   Neurological: Negative for numbness.   All other systems reviewed and are negative.      Allergies:  Antabuse   Campral     Medications:    Albuterol   Gabapentin   hydroxyzine   Medrol Dosepak  Impression: Other corneal scars: H17.89.  Plan: Multiple FB scars s/p  metal removal. "  Oxycodone  Tramadol     Past Medical History:    alcohol abuse   DDD   Asthma   Ankylosing spondylitis     Past Surgical History:    Orthopedic surgery     Family History:    Ulcerative colitis- mother   Anxiety- mother    Social History:  Alcohol abuse        Physical Exam     Patient Vitals for the past 24 hrs:   BP Temp Temp src Pulse Resp SpO2 Height Weight   07/21/21 1412 (!) 151/85 97.9  F (36.6  C) Temporal 101 18 97 % 1.854 m (6' 1\") 88.5 kg (195 lb)       Physical Exam  Eye:  Pupils are equal, round, and reactive.  Extraocular movements intact.    ENT:  No rhinorrhea.  Moist mucus membranes.  Normal tongue and tonsil.    Cardiac:  Regular rate and rhythm.  No murmurs, gallops, or rubs.    Pulmonary:  Clear to auscultation bilaterally.  No wheezes, rales, or rhonchi.    Abdomen:  Positive bowel sounds.  Abdomen is soft and non-distended, without focal tenderness.    Musculoskeletal:  No midline tenderness to the spine.  There is no reproducible pain to palpation to tissues on the back.     Skin:  Warm and dry without rashes.  Feet are equally warm with strong DP pulses.    Neuro:  Normal sensation throughout the legs and perineum.  5/5 strength through the hips/knees/ankles.  2+ patellar reflexes.  Normal gait.    Psych:  Normal affect with appropriate interaction with examiner.    Emergency Department Course     Laboratory:    CBC: WBC 6.7, HGB 15.9,      BMP: glucose 109 (H) o/w WNL (Creatinine 0.90)     Asymptomatic COVID19 Virus PCR by nasopharyngeal swab pending     Procedures:    Emergency Department Course:    Reviewed:  I reviewed nursing notes, vitals, past history and care everywhere    Assessments:  1427 I obtained history and examined the patient as noted above.     Consults:   1439 I spoke with Dr. Trimble of the Hospitalist service from [facility] regarding patient's presentation, findings, and plan of care.    1507 I spoke with Dr. Brady of orthopedics regarding " "patient's presentation, findings, and plan of care.     1518 I spoke with Dr. Trimble of the Hospitalist service from [facility] regarding patient's presentation, findings, and plan of care.    Interventions:  1435 Dilaudid 1 mg IV     1436 Toradol 15 mg IV     Disposition:  The patient was admitted to the hospital under the care of Dr. Trimble.    Impression & Plan      Medical Decision Making:  This unfortunate 46-year-old man presents to us for intractable back pain.  This is been a somewhat chronic condition which improved over time.  However, on the past several weeks, the pain has worsened and become intractable.  He has seen his sports medicine doctor and physical therapist, and despite this, he is having worsening spasms and pain despite use of prednisone.  He notes the pain is worse with sitting and trying to bear down and therefore he has not had a bowel movement in the last 2 to 3 days.  He is now having difficulty urinating.  He states that he stands \"almost all the day because it is the only position that is comfortable\" and that his \"legs are getting tired.\"  He notes that he has spells when the back spasms when he falls to the ground and cannot control himself due to the severity of pain.    On my exam, he is standing erect and ambulating about the room.  He has no midline tenderness but diffuse tenderness through the bilateral lower back though he states that he is \"deeper inside.\" He has no weakness or numbness down his legs.  A laboratory investigation was pursued which is unremarkable.  He underwent an MRI less than 1 week ago which shows Schmorl's nodes in L2 and L3 with uptake in the marrow concerning for an acute injury.  However, there is otherwise no sign of acute spinal injury.    I did speak briefly with Dr. Oneil of spine surgery who reviewed the case with me.  Unfortunately, it does not appear as though there is likely to be much intervention that can be pursued.  Nonetheless, he " will review the case and provide recommendations.  The patient does not feel as though he can be discharged home as he is unable to perform his activities of daily living.  Therefore, he was given pain medication here and will be admitted to the hospitalist service.  I do not feel he requires repeat imaging.  The patient's questions were answered he is comfortable with this plan for admission.      Covid-19  Jaun Lobo was evaluated during a global COVID-19 pandemic, which necessitated consideration that the patient might be at risk for infection with the SARS-CoV-2 virus that causes COVID-19.   Applicable protocols for evaluation were followed during the patient's care.   COVID-19 was considered as part of the patient's evaluation. The plan for testing is:  a test was obtained during this visit.    Diagnosis:    ICD-10-CM    1. Intractable back pain  M54.9        Scribe Disclosure:  I, Mara Carpio, am serving as a scribe at 2:17 PM on 7/21/2021 to document services personally performed by Trierweiler, Chad A, MD based on my observations and the provider's statements to me.      Trierweiler, Chad A, MD  07/21/21 0499

## 2022-12-12 ENCOUNTER — HOSPITAL ENCOUNTER (OUTPATIENT)
Dept: GENERAL RADIOLOGY | Facility: CLINIC | Age: 47
Discharge: HOME | End: 2022-12-12
Attending: STUDENT IN AN ORGANIZED HEALTH CARE EDUCATION/TRAINING PROGRAM | Admitting: STUDENT IN AN ORGANIZED HEALTH CARE EDUCATION/TRAINING PROGRAM
Payer: COMMERCIAL

## 2022-12-12 DIAGNOSIS — R05.9 COUGH: ICD-10-CM

## 2022-12-12 PROCEDURE — 71046 X-RAY EXAM CHEST 2 VIEWS: CPT

## 2022-12-12 PROCEDURE — 71046 X-RAY EXAM CHEST 2 VIEWS: CPT | Mod: 26 | Performed by: RADIOLOGY

## 2023-01-26 ENCOUNTER — MYC MEDICAL ADVICE (OUTPATIENT)
Dept: FAMILY MEDICINE | Facility: CLINIC | Age: 48
End: 2023-01-26
Payer: COMMERCIAL

## 2023-01-26 DIAGNOSIS — M51.369 DDD (DEGENERATIVE DISC DISEASE), LUMBAR: Primary | ICD-10-CM

## 2023-01-30 ENCOUNTER — OFFICE VISIT (OUTPATIENT)
Dept: PULMONOLOGY | Facility: CLINIC | Age: 48
End: 2023-01-30
Attending: FAMILY MEDICINE
Payer: COMMERCIAL

## 2023-01-30 ENCOUNTER — PRE VISIT (OUTPATIENT)
Dept: PULMONOLOGY | Facility: CLINIC | Age: 48
End: 2023-01-30

## 2023-01-30 VITALS — OXYGEN SATURATION: 98 % | SYSTOLIC BLOOD PRESSURE: 129 MMHG | DIASTOLIC BLOOD PRESSURE: 75 MMHG | HEART RATE: 69 BPM

## 2023-01-30 DIAGNOSIS — J30.2 SEASONAL ALLERGIC RHINITIS, UNSPECIFIED TRIGGER: ICD-10-CM

## 2023-01-30 DIAGNOSIS — R05.9 COUGH: ICD-10-CM

## 2023-01-30 DIAGNOSIS — R05.3 CHRONIC COUGH: Primary | ICD-10-CM

## 2023-01-30 PROCEDURE — G0463 HOSPITAL OUTPT CLINIC VISIT: HCPCS | Performed by: STUDENT IN AN ORGANIZED HEALTH CARE EDUCATION/TRAINING PROGRAM

## 2023-01-30 PROCEDURE — 94375 RESPIRATORY FLOW VOLUME LOOP: CPT | Performed by: INTERNAL MEDICINE

## 2023-01-30 PROCEDURE — 99205 OFFICE O/P NEW HI 60 MIN: CPT | Mod: 25 | Performed by: STUDENT IN AN ORGANIZED HEALTH CARE EDUCATION/TRAINING PROGRAM

## 2023-01-30 PROCEDURE — 94729 DIFFUSING CAPACITY: CPT | Performed by: INTERNAL MEDICINE

## 2023-01-30 PROCEDURE — 94726 PLETHYSMOGRAPHY LUNG VOLUMES: CPT | Performed by: INTERNAL MEDICINE

## 2023-01-30 NOTE — NURSING NOTE
Chief Complaint   Patient presents with     New Patient     Cough       Vitals were taken and medications were reconciled.     SHRAVAN Mike

## 2023-01-30 NOTE — LETTER
1/30/2023         RE: Jaun Lobo  [address]  [place] [address]        Dear Colleague,    Thank you for referring your patient, Jaun Lobo, to the [facility] AND [facility]. Please see a copy of my visit note below.    [facility]    Pulmonary, Allergy, Critical Care and Sleep Medicine    Initial Clinic Visit  1/30/23    Jaun Lobo MRN# [id number]   Age: 47 year old YOB: 1975     Primary care provider: Harleen Ny     Assessment and Recommendations:    Jaun Lobo is a 47 year old male with a history of asthma, allergic rhinitis, GERD, prior alcohol use, and suspicion for Ankylosing spondylitis who presents for chronic cough.    Chronic Cough  Allergic Rhinitis  History of asthma and significant allergies as a child treated with allergy immunotherapy with good response. Increase of seasonal allergy symptoms in past five years, with significant cough during spring and fall in past several years. Largely without any breathing or asthma like symptoms though does experience wheezing when around strong allergens like mold and will treat with albuterol. Cough severe, lasting for hours and preventing sleep for weeks at a time during spring and fall, or depending on environmental conditions. Normal PFTs and CXR, though is completely asymptomatic today. Do think cough is tied to seasonal allergic rhinitis. Does have some other potential allergic triggers at home (cat) but is asymptomatic outside of regular seasonal episodes so think mostly driven by environmental allergens. Given history, positive response to allergy shots in past, will refer to Allergy for evaluation. Has never had ENT evaluation and this would also be a possibility in future.   - Allergy referral  - Continued use of albuterol PRN for occasional wheezing  - Agree with use of Singulair, Flonase, Claritin during times of increased allergic  "symptoms for now pending Allergy evaluation  - Discussed if lack of improvement can return to Pulmonary clinic or consider evaluation in ENT clinic    Vaccinations  - Up to date with COVID and Influenza vaccination    Follow up in future as needed    Michelle Matos MD PhD  Pulmonary and Critical Care   Pager [phone]    60 minutes spent on the date of the encounter doing chart review, history and exam, documentation and further activities per the note.    History of Present Illness:    HPI:   Severe onset of symptoms over the summer while vacationing in [place] during June 2022. Workup of CXR, EKG, and blood tests negative. Primary Care visit 7/1/2022 for bronchitis and sinusitis, prescribed Arnuity and Augmentin. Repeat visit a few weeks later for URI and prescribed Flonase, albuterol, and Singulair with referral to Pulmonary. COVID-19 infection mid August and received Paxlovid but did not tolerate it due to worsening mental health symptoms. Rheumatology visit October given history of presumptive diagnosis of ankylosing spondylitis (family history, back pain, postive HLA-B27). Spinal imaging performed showing extensive degenerative throughout entire spine.     Today reports ongoing/recurrent cough symptoms for several years. Symptoms are present in the fall and spring, no symptoms today in clinic. In the past symptoms lasted for weeks and were not responsive to meds. Experiences \"lung irritation\" with coughing that can last hours making it hard to sleep and causing a loss of voice. Symptoms most recently occurred this past fall. Started in early August and were relieved by prednisone burst. Also used Codeine and tramadol to help with cough and night and sleep.    Suspects that allergies are a trigger for symptoms. Numerous allergies as a kid (cats, ragweed, goldenrod, mold and mildew). Also with serious asthma with attacks as a kid where could not breathe. Took allergy shots for a number of years " "which helped and then in his 20s the allergy symptoms receded before feeling like they were coming back in the past 5 years. Big difference as opposed to when child is that not having any asthma like symptoms. During times of the year will notice clear difference with breathing if sleeping outside/sleeping with windows open vs much better when sleeping with just the AC.     Currently when symptomatic will have a dry cough and \"upper lung tickle.\" Does have some drainage from nose but has experienced bad PND in past and does not feel like this is the same. No sense that has phlegm that not able to cough up. Occasional wheezing if around mold when has gone to a cabin and has had increased cough at some of those times. At times when has had wheezing will use albuterol which helps the wheeze but does not resolve cough if having cough symptoms. Did not feel effect from Arnuity after several weeks. Stopped the Singulair in the fall once his cough resolved, does think it helped. Will use allergy meds when symptomatic, loratidine more helpful than cetirizine. Will use Flonase when symptomatic.     No reflux since stopped drinking relatively recently. No issues with being physically active. Sleeps ok when doesn't have a cough. Does use medication for sleep and meditation.     Exposure History  Former occasional social smoker from age 11 until his early 40s, possibly about 1-2 packs per week. No vaping or marijuana use. Tried vaping once but made him cough. Works as a . No mold or water damage in his home, hardwood floor with regular vacuuming. Has central AC with filters. One cat that is a \"low allergen\" cat and is kept out of the bedroom. Does a little carpentry, does hunting, fishing, gardening. No asbestos or TB exposure.    Family history significant for mother with ulcerative colitis and an uncle with ankylosing spondylitis.    Additional data from chart review  Primary Care and Rheumatology notes reviewed " and summarized as above    Procedures  12/2017 EGD: Normal    1/30/23 PFTs: Personally reviewed, Normal    Imaging  12/2022 CXR: Personally reviewed, No acute findings    Review of Systems:  Complete 12 point ROS negative unless mentioned in HPI    Histories, Medications, Allergies:    Past Medical History:  Past Medical History:   Diagnosis Date     Alcohol dependence (H)      Allergic rhinitis, cause unspecified     allergy shots as kid     Anxiety state, unspecified     on Maryellen's     Back disorder     degenerative      DDD (degenerative disc disease)      Esophagitis      Lichen planus   2007     MEDICAL HISTORY OF - 3/10/10    ulcerative esophagitis     Mild intermittent asthma     rare use of albuteral     Tobacco use disorder     quit 2002     Unspecified hemorrhoids without mention of complication      Past Surgical History:  Past Surgical History:   Procedure Laterality Date     CL AFF SURGICAL PATHOLOGY       ESOPHAGOSCOPY, GASTROSCOPY, DUODENOSCOPY (EGD), COMBINED  5/9/2014    Procedure: COMBINED ESOPHAGOSCOPY, GASTROSCOPY, DUODENOSCOPY (EGD), BIOPSY SINGLE OR MULTIPLE;  Surgeon: Yanely Macias MD;  Location: [facility]     wisdom teeth       ZZC NONSPECIFIC PROCEDURE      nasal fx; left clavic fx     ZZC NONSPECIFIC PROCEDURE  12/04    normal colonoscopy; rpt age 49     Past Social History:  Social History     Socioeconomic History     Marital status: Single     Spouse name: Not on file     Number of children: 0     Years of education: Not on file     Highest education level: Not on file   Occupational History     Occupation:       Employer: [employer]   Tobacco Use     Smoking status: Former     Smokeless tobacco: Never   Substance and Sexual Activity     Alcohol use: No     Alcohol/week: 16.7 standard drinks     Types: 20 Cans of beer per week     Comment: 40 drinks a week , quit drinking 6 weeks ago      Drug use: No     Sexual activity: Yes     Partners: Female   Other  Topics Concern     Parent/sibling w/ CABG, MI or angioplasty before 65F 55M? No   Social History Narrative    Balanced Diet - Yes    Osteoporosis Preventative measures-  Dairy servings per day: 2 and Weight bearing exercise    Regular Exercise -  Yes Describe Runs every AM for 15 minutes.    Dental Exam up - YES - Date: 05/2003    Seatbelts used - Yes    Self Testicular Exam -  Yes    Abuse: Current or Past (Physical, Sexual or Emotional)- No    Do you have any concerns about STD's -  No    Do you feel safe in your environment - Yes    Guns stored in the home - No    Sunscreen used - No    Lipids - YES - Date: Has been several years.    Glucose -  YES - Date: Has been several years.    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                         Social Determinants of Health     Financial Resource Strain: Not on file   Food Insecurity: Not on file   Transportation Needs: Not on file   Physical Activity: Not on file   Stress: Not on file   Social Connections: Not on file   Intimate Partner Violence: Not on file   Housing Stability: Not on file     Family History:  Family History   Problem Relation Age of Onset     Cerebrovascular Disease Maternal Grandfather         had strokes in his 80's     Gastrointestinal Disease Mother         ulcerative colitis     Anxiety Disorder Mother      Glaucoma Paternal Grandmother      Macular Degeneration Paternal Grandmother      Cancer Other         esophageal cancer, cousin     C.A.D. No family hx of      Diabetes No family hx of      Hypertension No family hx of      Breast Cancer No family hx of      Cancer - colorectal No family hx of      Prostate Cancer No family hx of      Unknown/Adopted No family hx of      Depression No family hx of      Schizophrenia No family hx of       Bipolar Disorder No family hx of      Suicide No family hx of      Substance Abuse No family hx of      Dementia No family hx of      [place] Disease No family hx of      Parkinsonism No family hx of      Autism Spectrum Disorder No family hx of      Intellectual Disability (Mental Retardation) No family hx of      Mental Illness No family hx of      Medications:  Current Outpatient Medications   Medication     acetaminophen (TYLENOL) 325 MG tablet     acetaminophen (TYLENOL) 500 MG tablet     albuterol (PROAIR HFA/PROVENTIL HFA/VENTOLIN HFA) 108 (90 Base) MCG/ACT inhaler     cetirizine (ZYRTEC) 10 MG tablet     fluticasone (FLONASE) 50 MCG/ACT nasal spray     hydrOXYzine (ATARAX) 25 MG tablet     Ibuprofen (ADVIL PO)     melatonin 3 MG tablet     montelukast (SINGULAIR) 10 MG tablet     multivitamin w/minerals (THERA-VIT-M) tablet     naloxone (NARCAN) 4 MG/0.1ML nasal spray     temazepam (RESTORIL) 15 MG capsule     temazepam (RESTORIL) 7.5 MG capsule     traMADol (ULTRAM) 50 MG tablet     No current facility-administered medications for this visit.     Allergies:  Allergies   Allergen Reactions     Antabuse [Disulfiram] Other (See Comments)     hepatitis     Campral [Acamprosate] Other (See Comments)     Mood changes     Animal Dander      Dust Mite Extract      No Known Drug Allergies      Ragweeds      goldenrod     Seasonal Allergies      Physical Exam:    /75   Pulse 69   SpO2 98%     General: Sitting up in NAD  HEENT: anicteric, moist mucosa  Neck: no palpable lymphadenopathy, no JVD noted  Chest: CTAB, no wheezing or crackles  Cardiac: RRR no murmurs, radial pulses intact  Abdomen: Soft, non tender, active BS  Extremities: No LE Edema  Neuro: A&Ox3, no focal deficits   Skin: no rash noted on limited exam  Psych: Appropriate  Laboratory, imaging, and microbiologic data:    All laboratory and imaging data reviewed, pertinent results discussed above.        Again, thank you for allowing me to  participate in the care of your patient.      Sincerely,    Michelle Matos MD

## 2023-01-30 NOTE — PROGRESS NOTES
[facility]    Pulmonary, Allergy, Critical Care and Sleep Medicine    Initial Clinic Visit  1/30/23    Jaun Lobo MRN# [id number]   Age: 47 year old YOB: 1975     Primary care provider: Harleen Ny     Assessment and Recommendations:    Jaun Lobo is a 47 year old male with a history of asthma, allergic rhinitis, GERD, prior alcohol use, and suspicion for Ankylosing spondylitis who presents for chronic cough.    Chronic Cough  Allergic Rhinitis  History of asthma and significant allergies as a child treated with allergy immunotherapy with good response. Increase of seasonal allergy symptoms in past five years, with significant cough during spring and fall in past several years. Largely without any breathing or asthma like symptoms though does experience wheezing when around strong allergens like mold and will treat with albuterol. Cough severe, lasting for hours and preventing sleep for weeks at a time during spring and fall, or depending on environmental conditions. Normal PFTs and CXR, though is completely asymptomatic today. Do think cough is tied to seasonal allergic rhinitis. Does have some other potential allergic triggers at home (cat) but is asymptomatic outside of regular seasonal episodes so think mostly driven by environmental allergens. Given history, positive response to allergy shots in past, will refer to Allergy for evaluation. Has never had ENT evaluation and this would also be a possibility in future.   - Allergy referral  - Continued use of albuterol PRN for occasional wheezing  - Agree with use of Singulair, Flonase, Claritin during times of increased allergic symptoms for now pending Allergy evaluation  - Discussed if lack of improvement can return to Pulmonary clinic or consider evaluation in ENT clinic    Vaccinations  - Up to date with COVID and Influenza vaccination    Follow up in future as needed    Michelle Matos MD PhD  Pulmonary  "and Critical Care   Pager [phone]    60 minutes spent on the date of the encounter doing chart review, history and exam, documentation and further activities per the note.    History of Present Illness:    HPI:   Severe onset of symptoms over the summer while vacationing in [place] during June 2022. Workup of CXR, EKG, and blood tests negative. Primary Care visit 7/1/2022 for bronchitis and sinusitis, prescribed Arnuity and Augmentin. Repeat visit a few weeks later for URI and prescribed Flonase, albuterol, and Singulair with referral to Pulmonary. COVID-19 infection mid August and received Paxlovid but did not tolerate it due to worsening mental health symptoms. Rheumatology visit October given history of presumptive diagnosis of ankylosing spondylitis (family history, back pain, postive HLA-B27). Spinal imaging performed showing extensive degenerative throughout entire spine.     Today reports ongoing/recurrent cough symptoms for several years. Symptoms are present in the fall and spring, no symptoms today in clinic. In the past symptoms lasted for weeks and were not responsive to meds. Experiences \"lung irritation\" with coughing that can last hours making it hard to sleep and causing a loss of voice. Symptoms most recently occurred this past fall. Started in early August and were relieved by prednisone burst. Also used Codeine and tramadol to help with cough and night and sleep.    Suspects that allergies are a trigger for symptoms. Numerous allergies as a kid (cats, ragweed, goldenrod, mold and mildew). Also with serious asthma with attacks as a kid where could not breathe. Took allergy shots for a number of years which helped and then in his 20s the allergy symptoms receded before feeling like they were coming back in the past 5 years. Big difference as opposed to when child is that not having any asthma like symptoms. During times of the year will notice clear difference with breathing if sleeping " "outside/sleeping with windows open vs much better when sleeping with just the AC.     Currently when symptomatic will have a dry cough and \"upper lung tickle.\" Does have some drainage from nose but has experienced bad PND in past and does not feel like this is the same. No sense that has phlegm that not able to cough up. Occasional wheezing if around mold when has gone to a cabin and has had increased cough at some of those times. At times when has had wheezing will use albuterol which helps the wheeze but does not resolve cough if having cough symptoms. Did not feel effect from Arnuity after several weeks. Stopped the Singulair in the fall once his cough resolved, does think it helped. Will use allergy meds when symptomatic, loratidine more helpful than cetirizine. Will use Flonase when symptomatic.     No reflux since stopped drinking relatively recently. No issues with being physically active. Sleeps ok when doesn't have a cough. Does use medication for sleep and meditation.     Exposure History  Former occasional social smoker from age 11 until his early 40s, possibly about 1-2 packs per week. No vaping or marijuana use. Tried vaping once but made him cough. Works as a . No mold or water damage in his home, hardwood floor with regular vacuuming. Has central AC with filters. One cat that is a \"low allergen\" cat and is kept out of the bedroom. Does a little carpentry, does hunting, fishing, gardening. No asbestos or TB exposure.    Family history significant for mother with ulcerative colitis and an uncle with ankylosing spondylitis.    Additional data from chart review  Primary Care and Rheumatology notes reviewed and summarized as above    Procedures  12/2017 EGD: Normal    1/30/23 PFTs: Personally reviewed, Normal    Imaging  12/2022 CXR: Personally reviewed, No acute findings    Review of Systems:  Complete 12 point ROS negative unless mentioned in HPI    Histories, Medications, Allergies:    Past " Medical History:  Past Medical History:   Diagnosis Date     Alcohol dependence (H)      Allergic rhinitis, cause unspecified     allergy shots as kid     Anxiety state, unspecified     on Maryellen's     Back disorder     degenerative      DDD (degenerative disc disease)      Esophagitis      Lichen planus   2007     MEDICAL HISTORY OF - 3/10/10    ulcerative esophagitis     Mild intermittent asthma     rare use of albuteral     Tobacco use disorder     quit 2002     Unspecified hemorrhoids without mention of complication      Past Surgical History:  Past Surgical History:   Procedure Laterality Date     CL AFF SURGICAL PATHOLOGY       ESOPHAGOSCOPY, GASTROSCOPY, DUODENOSCOPY (EGD), COMBINED  5/9/2014    Procedure: COMBINED ESOPHAGOSCOPY, GASTROSCOPY, DUODENOSCOPY (EGD), BIOPSY SINGLE OR MULTIPLE;  Surgeon: Yanely Macias MD;  Location: [facility]     wisdom teeth       ZZC NONSPECIFIC PROCEDURE      nasal fx; left clavic fx     ZZC NONSPECIFIC PROCEDURE  12/04    normal colonoscopy; rpt age 49     Past Social History:  Social History     Socioeconomic History     Marital status: Single     Spouse name: Not on file     Number of children: 0     Years of education: Not on file     Highest education level: Not on file   Occupational History     Occupation:       Employer: [employer]   Tobacco Use     Smoking status: Former     Smokeless tobacco: Never   Substance and Sexual Activity     Alcohol use: No     Alcohol/week: 16.7 standard drinks     Types: 20 Cans of beer per week     Comment: 40 drinks a week , quit drinking 6 weeks ago      Drug use: No     Sexual activity: Yes     Partners: Female   Other Topics Concern     Parent/sibling w/ CABG, MI or angioplasty before 65F 55M? No   Social History Narrative    Balanced Diet - Yes    Osteoporosis Preventative measures-  Dairy servings per day: 2 and Weight bearing exercise    Regular Exercise -  Yes Describe Runs every AM for 15 minutes.     Dental Exam up - YES - Date: 05/2003    Seatbelts used - Yes    Self Testicular Exam -  Yes    Abuse: Current or Past (Physical, Sexual or Emotional)- No    Do you have any concerns about STD's -  No    Do you feel safe in your environment - Yes    Guns stored in the home - No    Sunscreen used - No    Lipids - YES - Date: Has been several years.    Glucose -  YES - Date: Has been several years.    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                         Social Determinants of Health     Financial Resource Strain: Not on file   Food Insecurity: Not on file   Transportation Needs: Not on file   Physical Activity: Not on file   Stress: Not on file   Social Connections: Not on file   Intimate Partner Violence: Not on file   Housing Stability: Not on file     Family History:  Family History   Problem Relation Age of Onset     Cerebrovascular Disease Maternal Grandfather         had strokes in his 80's     Gastrointestinal Disease Mother         ulcerative colitis     Anxiety Disorder Mother      Glaucoma Paternal Grandmother      Macular Degeneration Paternal Grandmother      Cancer Other         esophageal cancer, cousin     C.A.D. No family hx of      Diabetes No family hx of      Hypertension No family hx of      Breast Cancer No family hx of      Cancer - colorectal No family hx of      Prostate Cancer No family hx of      Unknown/Adopted No family hx of      Depression No family hx of      Schizophrenia No family hx of      Bipolar Disorder No family hx of      Suicide No family hx of      Substance Abuse No family hx of      Dementia No family hx of      [place] Disease No family hx of      Parkinsonism No family hx of      Autism Spectrum Disorder No family hx of      Intellectual Disability (Mental  Retardation) No family hx of      Mental Illness No family hx of      Medications:  Current Outpatient Medications   Medication     acetaminophen (TYLENOL) 325 MG tablet     acetaminophen (TYLENOL) 500 MG tablet     albuterol (PROAIR HFA/PROVENTIL HFA/VENTOLIN HFA) 108 (90 Base) MCG/ACT inhaler     cetirizine (ZYRTEC) 10 MG tablet     fluticasone (FLONASE) 50 MCG/ACT nasal spray     hydrOXYzine (ATARAX) 25 MG tablet     Ibuprofen (ADVIL PO)     melatonin 3 MG tablet     montelukast (SINGULAIR) 10 MG tablet     multivitamin w/minerals (THERA-VIT-M) tablet     naloxone (NARCAN) 4 MG/0.1ML nasal spray     temazepam (RESTORIL) 15 MG capsule     temazepam (RESTORIL) 7.5 MG capsule     traMADol (ULTRAM) 50 MG tablet     No current facility-administered medications for this visit.     Allergies:  Allergies   Allergen Reactions     Antabuse [Disulfiram] Other (See Comments)     hepatitis     Campral [Acamprosate] Other (See Comments)     Mood changes     Animal Dander      Dust Mite Extract      No Known Drug Allergies      Ragweeds      goldenrod     Seasonal Allergies      Physical Exam:    /75   Pulse 69   SpO2 98%     General: Sitting up in NAD  HEENT: anicteric, moist mucosa  Neck: no palpable lymphadenopathy, no JVD noted  Chest: CTAB, no wheezing or crackles  Cardiac: RRR no murmurs, radial pulses intact  Abdomen: Soft, non tender, active BS  Extremities: No LE Edema  Neuro: A&Ox3, no focal deficits   Skin: no rash noted on limited exam  Psych: Appropriate  Laboratory, imaging, and microbiologic data:    All laboratory and imaging data reviewed, pertinent results discussed above.

## 2023-01-30 NOTE — TELEPHONE ENCOUNTER
"Dr. Ny and Referral Coordinators-Please review patient's request for [facility] referral:  1. Is this within patient's network?  2. Writer unsure what type of referral to pend    \"Hi Dr. Ny,     As you may remember, I have seen a number of specialists over the last two years regarding my diagnoses of  severe degenerative disc disease, stenosis, arthritis, and severe chronic pain.   Specialists have included orthopedists, a rheumatologist, and a physiatrist in addition to ongoing pain management at the [facility].     Having exhausted most of these avenues, which are primarily concerned with symptoms management rather than a cure, I am VERY interested in a referral for stem cell treatment evaluation to arrest the progression of my disease.  I am only 47 and I've experienced a stunning loss of functionality in the last 2 years.  I'll be severely disabled by 54 at this rate.     Dr. Frankie Puente at the [facility] is pioneering research in stem cell therapy and deals with patients like me.  I've tried to contact him personally without success.  Would you be so kind as to write a referral so that I can consult him?  I believe he may be in-network for me (due to his position at [facility]) even though he practices in [place].       Thanks,  Jaun\"    Thank you!  NEELAM DonovanN, RN-[facility]    "

## 2023-01-30 NOTE — TELEPHONE ENCOUNTER
Spine referral faxed to [facility]: [phone].    Writer responded via Chain.    RODRÍGUEZ Donovan, RN-BC  [facility]

## 2023-01-31 LAB
DLCOUNC-%PRED-PRE: 115 %
DLCOUNC-PRE: 37.44 ML/MIN/MMHG
DLCOUNC-PRED: 32.51 ML/MIN/MMHG
ERV-%PRED-PRE: 138 %
ERV-PRE: 2.34 L
ERV-PRED: 1.68 L
EXPTIME-PRE: 9.7 SEC
FEF2575-%PRED-PRE: 83 %
FEF2575-PRE: 3.13 L/SEC
FEF2575-PRED: 3.76 L/SEC
FEFMAX-%PRED-PRE: 88 %
FEFMAX-PRE: 9.39 L/SEC
FEFMAX-PRED: 10.61 L/SEC
FEV1-%PRED-PRE: 107 %
FEV1-PRE: 4.37 L
FEV1FEV6-PRE: 74 %
FEV1FEV6-PRED: 81 %
FEV1FVC-PRE: 72 %
FEV1FVC-PRED: 80 %
FEV1SVC-PRE: 72 %
FEV1SVC-PRED: 70 %
FIFMAX-PRE: 8.22 L/SEC
FRCPLETH-%PRED-PRE: 125 %
FRCPLETH-PRE: 4.62 L
FRCPLETH-PRED: 3.68 L
FVC-%PRED-PRE: 118 %
FVC-PRE: 6.07 L
FVC-PRED: 5.14 L
IC-%PRED-PRE: 90 %
IC-PRE: 3.7 L
IC-PRED: 4.11 L
RVPLETH-%PRED-PRE: 101 %
RVPLETH-PRE: 2.28 L
RVPLETH-PRED: 2.26 L
TLCPLETH-%PRED-PRE: 107 %
TLCPLETH-PRE: 8.32 L
TLCPLETH-PRED: 7.74 L
VA-%PRED-PRE: 105 %
VA-PRE: 7.75 L
VC-%PRED-PRE: 104 %
VC-PRE: 6.04 L
VC-PRED: 5.79 L

## 2023-03-03 ENCOUNTER — OFFICE VISIT (OUTPATIENT)
Dept: FAMILY MEDICINE | Facility: CLINIC | Age: 48
End: 2023-03-03
Payer: COMMERCIAL

## 2023-03-03 VITALS
BODY MASS INDEX: 25.19 KG/M2 | HEIGHT: 73 IN | SYSTOLIC BLOOD PRESSURE: 137 MMHG | TEMPERATURE: 97.7 F | HEART RATE: 76 BPM | OXYGEN SATURATION: 98 % | RESPIRATION RATE: 16 BRPM | WEIGHT: 190.07 LBS | DIASTOLIC BLOOD PRESSURE: 80 MMHG

## 2023-03-03 DIAGNOSIS — Z12.5 ENCOUNTER FOR PROSTATE CANCER SCREENING: ICD-10-CM

## 2023-03-03 DIAGNOSIS — Z13.1 SCREENING FOR DIABETES MELLITUS: ICD-10-CM

## 2023-03-03 DIAGNOSIS — G47.00 INSOMNIA, UNSPECIFIED TYPE: ICD-10-CM

## 2023-03-03 DIAGNOSIS — Z23 NEED FOR VACCINATION: ICD-10-CM

## 2023-03-03 DIAGNOSIS — Z51.81 ENCOUNTER FOR THERAPEUTIC DRUG MONITORING: ICD-10-CM

## 2023-03-03 DIAGNOSIS — Z13.220 LIPID SCREENING: ICD-10-CM

## 2023-03-03 DIAGNOSIS — F10.21 ALCOHOL DEPENDENCE IN REMISSION (H): ICD-10-CM

## 2023-03-03 DIAGNOSIS — M45.9 ANKYLOSING SPONDYLITIS, UNSPECIFIED SITE OF SPINE (H): ICD-10-CM

## 2023-03-03 DIAGNOSIS — Z00.00 ROUTINE GENERAL MEDICAL EXAMINATION AT A HEALTH CARE FACILITY: Primary | ICD-10-CM

## 2023-03-03 LAB
ALBUMIN SERPL BCG-MCNC: 4.6 G/DL (ref 3.5–5.2)
ALP SERPL-CCNC: 48 U/L (ref 40–129)
ALT SERPL W P-5'-P-CCNC: 19 U/L (ref 10–50)
ANION GAP SERPL CALCULATED.3IONS-SCNC: 11 MMOL/L (ref 7–15)
AST SERPL W P-5'-P-CCNC: 25 U/L (ref 10–50)
BILIRUB SERPL-MCNC: 1.2 MG/DL
BUN SERPL-MCNC: 13.5 MG/DL (ref 6–20)
CALCIUM SERPL-MCNC: 9.4 MG/DL (ref 8.6–10)
CHLORIDE SERPL-SCNC: 101 MMOL/L (ref 98–107)
CHOLEST SERPL-MCNC: 186 MG/DL
CREAT SERPL-MCNC: 0.84 MG/DL (ref 0.67–1.17)
DEPRECATED HCO3 PLAS-SCNC: 24 MMOL/L (ref 22–29)
ERYTHROCYTE [DISTWIDTH] IN BLOOD BY AUTOMATED COUNT: 12.5 % (ref 10–15)
GFR SERPL CREATININE-BSD FRML MDRD: >90 ML/MIN/1.73M2
GLUCOSE SERPL-MCNC: 94 MG/DL (ref 70–99)
HBA1C MFR BLD: 5.3 % (ref 0–5.6)
HCT VFR BLD AUTO: 43 % (ref 40–53)
HDLC SERPL-MCNC: 55 MG/DL
HGB BLD-MCNC: 14.7 G/DL (ref 13.3–17.7)
LDLC SERPL CALC-MCNC: 115 MG/DL
MCH RBC QN AUTO: 29.7 PG (ref 26.5–33)
MCHC RBC AUTO-ENTMCNC: 34.2 G/DL (ref 31.5–36.5)
MCV RBC AUTO: 87 FL (ref 78–100)
NONHDLC SERPL-MCNC: 131 MG/DL
PLATELET # BLD AUTO: 250 10E3/UL (ref 150–450)
POTASSIUM SERPL-SCNC: 4.2 MMOL/L (ref 3.4–5.3)
PROT SERPL-MCNC: 6.9 G/DL (ref 6.4–8.3)
PSA SERPL-MCNC: 0.43 NG/ML (ref 0–2.5)
RBC # BLD AUTO: 4.95 10E6/UL (ref 4.4–5.9)
SODIUM SERPL-SCNC: 136 MMOL/L (ref 136–145)
TRIGL SERPL-MCNC: 82 MG/DL
WBC # BLD AUTO: 5.8 10E3/UL (ref 4–11)

## 2023-03-03 PROCEDURE — 90471 IMMUNIZATION ADMIN: CPT | Performed by: FAMILY MEDICINE

## 2023-03-03 PROCEDURE — G0103 PSA SCREENING: HCPCS | Performed by: FAMILY MEDICINE

## 2023-03-03 PROCEDURE — 80061 LIPID PANEL: CPT | Performed by: FAMILY MEDICINE

## 2023-03-03 PROCEDURE — 80053 COMPREHEN METABOLIC PANEL: CPT | Performed by: FAMILY MEDICINE

## 2023-03-03 PROCEDURE — 85027 COMPLETE CBC AUTOMATED: CPT | Performed by: FAMILY MEDICINE

## 2023-03-03 PROCEDURE — 36415 COLL VENOUS BLD VENIPUNCTURE: CPT | Performed by: FAMILY MEDICINE

## 2023-03-03 PROCEDURE — 99213 OFFICE O/P EST LOW 20 MIN: CPT | Mod: 25 | Performed by: FAMILY MEDICINE

## 2023-03-03 PROCEDURE — 82306 VITAMIN D 25 HYDROXY: CPT | Performed by: FAMILY MEDICINE

## 2023-03-03 PROCEDURE — 83036 HEMOGLOBIN GLYCOSYLATED A1C: CPT | Performed by: FAMILY MEDICINE

## 2023-03-03 PROCEDURE — 99396 PREV VISIT EST AGE 40-64: CPT | Mod: 25 | Performed by: FAMILY MEDICINE

## 2023-03-03 PROCEDURE — 90677 PCV20 VACCINE IM: CPT | Performed by: FAMILY MEDICINE

## 2023-03-03 RX ORDER — TEMAZEPAM 15 MG/1
CAPSULE ORAL
Qty: 72 CAPSULE | Refills: 0 | Status: SHIPPED | OUTPATIENT
Start: 2023-03-03

## 2023-03-03 RX ORDER — MELOXICAM 15 MG/1
1 TABLET ORAL
COMMUNITY
Start: 2023-02-24 | End: 2023-06-30

## 2023-03-03 ASSESSMENT — ENCOUNTER SYMPTOMS
FEVER: 0
PALPITATIONS: 0
DYSURIA: 0
DIARRHEA: 0
SHORTNESS OF BREATH: 0
JOINT SWELLING: 0
ABDOMINAL PAIN: 0
EYE PAIN: 0
COUGH: 0
HEMATURIA: 0
FREQUENCY: 0
NAUSEA: 0
MYALGIAS: 0
WEAKNESS: 0
DIZZINESS: 0
NERVOUS/ANXIOUS: 1
HEADACHES: 0
PARESTHESIAS: 0
CONSTIPATION: 0
HEARTBURN: 0
SORE THROAT: 0
HEMATOCHEZIA: 0
CHILLS: 0
ARTHRALGIAS: 1

## 2023-03-03 ASSESSMENT — ASTHMA QUESTIONNAIRES
QUESTION_3 LAST FOUR WEEKS HOW OFTEN DID YOUR ASTHMA SYMPTOMS (WHEEZING, COUGHING, SHORTNESS OF BREATH, CHEST TIGHTNESS OR PAIN) WAKE YOU UP AT NIGHT OR EARLIER THAN USUAL IN THE MORNING: NOT AT ALL
QUESTION_2 LAST FOUR WEEKS HOW OFTEN HAVE YOU HAD SHORTNESS OF BREATH: NOT AT ALL
QUESTION_1 LAST FOUR WEEKS HOW MUCH OF THE TIME DID YOUR ASTHMA KEEP YOU FROM GETTING AS MUCH DONE AT WORK, SCHOOL OR AT HOME: NONE OF THE TIME
QUESTION_4 LAST FOUR WEEKS HOW OFTEN HAVE YOU USED YOUR RESCUE INHALER OR NEBULIZER MEDICATION (SUCH AS ALBUTEROL): NOT AT ALL
ACT_TOTALSCORE: 25
QUESTION_5 LAST FOUR WEEKS HOW WOULD YOU RATE YOUR ASTHMA CONTROL: COMPLETELY CONTROLLED
ACT_TOTALSCORE: 25

## 2023-03-03 NOTE — NURSING NOTE
Prior to immunization administration, verified patients identity using patient s name and date of birth. Please see Immunization Activity for additional information.     Screening Questionnaire for Adult Immunization    Are you sick today?   No   Do you have allergies to medications, food, a vaccine component or latex?   No   Have you ever had a serious reaction after receiving a vaccination?   No   Do you have a long-term health problem with heart, lung, kidney, or metabolic disease (e.g., diabetes), asthma, a blood disorder, no spleen, complement component deficiency, a cochlear implant, or a spinal fluid leak?  Are you on long-term aspirin therapy?   Yes   Do you have cancer, leukemia, HIV/AIDS, or any other immune system problem?   No   Do you have a parent, brother, or sister with an immune system problem?   No   In the past 3 months, have you taken medications that affect  your immune system, such as prednisone, other steroids, or anticancer drugs; drugs for the treatment of rheumatoid arthritis, Crohn s disease, or psoriasis; or have you had radiation treatments?   Yes   Have you had a seizure, or a brain or other nervous system problem?   No   During the past year, have you received a transfusion of blood or blood    products, or been given immune (gamma) globulin or antiviral drug?   No   For women: Are you pregnant or is there a chance you could become       pregnant during the next month?   No   Have you received any vaccinations in the past 4 weeks?   No     Immunization questionnaire was positive for at least one answer.  Notified Yanely.        Per orders of Dr. Ny, injection of PCV20 given by Lori Aguilar CMA. Patient instructed to remain in clinic for 15 minutes afterwards, and to report any adverse reaction to me immediately.       Screening performed by Lori Aguilar CMA on 3/3/2023 at 11:56 AM.

## 2023-03-03 NOTE — PROGRESS NOTES
SUBJECTIVE:   CC: Jaun is an 48 year old who presents for preventative health visit.   Patient has been advised of split billing requirements and indicates understanding: Yes  Healthy Habits:     Getting at least 3 servings of Calcium per day:  Yes    Bi-annual eye exam:  Yes    Dental care twice a year:  Yes    Sleep apnea or symptoms of sleep apnea:  None    Diet:  Regular (no restrictions)    Frequency of exercise:  4-5 days/week    Duration of exercise:  15-30 minutes    Taking medications regularly:  Yes    PHQ-2 Total Score: 2    Additional concerns today:  No    He takes temazepam 2-3 times/week.     Today's PHQ-2 Score:   PHQ-2 ( 1999 Pfizer) 3/3/2023   Q1: Little interest or pleasure in doing things 1   Q2: Feeling down, depressed or hopeless 1   PHQ-2 Score 2   PHQ-2 Total Score (12-17 Years)- Positive if 3 or more points; Administer PHQ-A if positive -   Q1: Little interest or pleasure in doing things Several days   Q2: Feeling down, depressed or hopeless Several days   PHQ-2 Score 2           Social History     Tobacco Use     Smoking status: Former     Smokeless tobacco: Never   Substance Use Topics     Alcohol use: No     Alcohol/week: 16.7 standard drinks     Types: 20 Cans of beer per week     Comment: 40 drinks a week , quit drinking 6 weeks ago        Alcohol Use 3/3/2023   Prescreen: >3 drinks/day or >7 drinks/week? Not Applicable       Last PSA:   PSA Tumor Marker   Date Value Ref Range Status   12/02/2021 0.68 0.00 - 4.00 ug/L Final           Reviewed and updated as needed this visit by clinical staff   Tobacco  Allergies  Meds              Reviewed and updated as needed this visit by Provider      Review of Systems   Constitutional: Negative for chills and fever.   HENT: Positive for congestion. Negative for ear pain, hearing loss and sore throat.    Eyes: Negative for pain and visual disturbance.   Respiratory: Negative for cough and shortness of breath.    Cardiovascular: Negative for  "chest pain, palpitations and peripheral edema.   Gastrointestinal: Negative for abdominal pain, constipation, diarrhea, heartburn, hematochezia and nausea.   Genitourinary: Negative for dysuria, frequency, genital sores, hematuria, impotence, penile discharge and urgency.   Musculoskeletal: Positive for arthralgias. Negative for joint swelling and myalgias.   Skin: Negative for rash.   Neurological: Negative for dizziness, weakness, headaches and paresthesias.   Psychiatric/Behavioral: Positive for mood changes. The patient is nervous/anxious.        OBJECTIVE:   /80 (BP Location: Right arm, Patient Position: Sitting, Cuff Size: Adult Regular)   Pulse 76   Temp 97.7  F (36.5  C) (Oral)   Resp 16   Ht 1.854 m (6' 1\")   Wt 86.2 kg (190 lb 1.1 oz)   SpO2 98%   BMI 25.08 kg/m      Physical Exam  GENERAL: healthy, alert and no distress  EYES: Eyes grossly normal to inspection,  conjunctivae and sclerae normal  HENT: ear canals and TM's normal  NECK: no adenopathy, no asymmetry, masses, or scars and thyroid normal to palpation  RESP: lungs clear to auscultation - no rales, rhonchi or wheezes  CV: regular rate and rhythm, normal S1 S2  ABDOMEN: soft, nontender, no hepatosplenomegaly, no masses and bowel sounds normal  MS: no gross musculoskeletal defects noted, no edema  SKIN: no suspicious lesions or rashes  NEURO: Normal strength and tone, mentation intact and speech normal  PSYCH: mentation appears normal, affect normal/bright    Diagnostic Test Results:  Labs reviewed in Epic    ASSESSMENT/PLAN:     Routine general medical examination at a health care facility  Up to date with hepatitis B immunizations   Per Jaun - Colonoscopy normal 2020, repeat 10 years   - REVIEW OF HEALTH MAINTENANCE PROTOCOL ORDERS    Ankylosing spondylitis, unspecified site of spine (H)  - He was previously diagnosed with ankylosing spondylitis. In reviewing recent rheumatology note, it appears more concern for polyarthralgia. " Continue rheumatology monitoring.     Alcohol dependence in remission     Insomnia, unspecified type  - reviewed [place] , no concerns   - temazepam (RESTORIL) 15 MG capsule; Take 1/2 to 1 tablet PO at bedtime as needed for sleep, 1 year script  Dispense: 72 capsule; Refill: 0    Need for vaccination  - Pneumococcal 20 Valent Conjugate (Prevnar 20)    Lipid screening  - Lipid panel reflex to direct LDL Fasting; Future  - Lipid panel reflex to direct LDL Fasting    Screening for diabetes mellitus  - Hemoglobin A1c; Future  - Hemoglobin A1c    Encounter for therapeutic drug monitoring  - Vitamin D Deficiency; Future  - CBC with platelets; Future  - Comprehensive metabolic panel (BMP + Alb, Alk Phos, ALT, AST, Total. Bili, TP); Future  - Vitamin D Deficiency  - CBC with platelets  - Comprehensive metabolic panel (BMP + Alb, Alk Phos, ALT, AST, Total. Bili, TP)    Encounter for prostate cancer screening  - PSA, screen; Future  - PSA, screen    Patient has been advised of split billing requirements and indicates understanding: Yes      COUNSELING:   Reviewed preventive health counseling, as reflected in patient instructions        He reports that he has quit smoking. He has never used smokeless tobacco.            Harleen Ny MD  [facility]

## 2023-03-04 LAB — DEPRECATED CALCIDIOL+CALCIFEROL SERPL-MC: 44 UG/L (ref 20–75)

## 2023-03-13 DIAGNOSIS — G47.00 INSOMNIA, UNSPECIFIED TYPE: ICD-10-CM

## 2023-03-15 NOTE — TELEPHONE ENCOUNTER
Script was sent 3/3/23 for one year     temazepam (RESTORIL) 15 MG capsule 72 capsule 0 3/3/2023  No   Sig: Take 1/2 to 1 tablet PO at bedtime as needed for sleep, 1 year script   Sent to pharmacy as: Temazepam 15 MG Oral Capsule (RESTORIL)   Class: E-Prescribe   Order: [id number]

## 2023-03-16 RX ORDER — TEMAZEPAM 15 MG/1
CAPSULE ORAL
Qty: 72 CAPSULE | Refills: 0 | OUTPATIENT
Start: 2023-03-16

## 2023-03-16 NOTE — TELEPHONE ENCOUNTER
Duplicate.  Nathalie Griffin RN    [facility]         Disp Refills Start End MELVI   temazepam (RESTORIL) 15 MG capsule 72 capsule 0 3/3/2023  No   Sig: Take 1/2 to 1 tablet PO at bedtime as needed for sleep, 1 year script

## 2023-04-24 ENCOUNTER — MYC MEDICAL ADVICE (OUTPATIENT)
Dept: FAMILY MEDICINE | Facility: CLINIC | Age: 48
End: 2023-04-24
Payer: COMMERCIAL

## 2023-04-24 DIAGNOSIS — M51.369 DDD (DEGENERATIVE DISC DISEASE), LUMBAR: Primary | ICD-10-CM

## 2023-04-25 ENCOUNTER — LAB (OUTPATIENT)
Dept: LAB | Facility: CLINIC | Age: 48
End: 2023-04-25
Payer: COMMERCIAL

## 2023-04-25 ENCOUNTER — OFFICE VISIT (OUTPATIENT)
Dept: ALLERGY | Facility: CLINIC | Age: 48
End: 2023-04-25
Payer: COMMERCIAL

## 2023-04-25 VITALS
WEIGHT: 193.2 LBS | DIASTOLIC BLOOD PRESSURE: 74 MMHG | SYSTOLIC BLOOD PRESSURE: 113 MMHG | HEART RATE: 69 BPM | OXYGEN SATURATION: 99 % | BODY MASS INDEX: 25.49 KG/M2

## 2023-04-25 DIAGNOSIS — R09.89 RUNNY NOSE: ICD-10-CM

## 2023-04-25 DIAGNOSIS — R05.3 CHRONIC COUGH: ICD-10-CM

## 2023-04-25 DIAGNOSIS — J30.1 SEASONAL ALLERGIC RHINITIS DUE TO POLLEN: ICD-10-CM

## 2023-04-25 DIAGNOSIS — J30.1 SEASONAL ALLERGIC RHINITIS DUE TO POLLEN: Primary | ICD-10-CM

## 2023-04-25 PROCEDURE — 86003 ALLG SPEC IGE CRUDE XTRC EA: CPT | Mod: 59

## 2023-04-25 PROCEDURE — 95004 PERQ TESTS W/ALRGNC XTRCS: CPT | Performed by: INTERNAL MEDICINE

## 2023-04-25 PROCEDURE — 86003 ALLG SPEC IGE CRUDE XTRC EA: CPT

## 2023-04-25 PROCEDURE — 99204 OFFICE O/P NEW MOD 45 MIN: CPT | Mod: 25 | Performed by: INTERNAL MEDICINE

## 2023-04-25 PROCEDURE — 36415 COLL VENOUS BLD VENIPUNCTURE: CPT

## 2023-04-25 RX ORDER — BUDESONIDE AND FORMOTEROL FUMARATE DIHYDRATE 160; 4.5 UG/1; UG/1
2 AEROSOL RESPIRATORY (INHALATION) 2 TIMES DAILY
Qty: 10.2 G | Refills: 4 | Status: SHIPPED | OUTPATIENT
Start: 2023-04-25

## 2023-04-25 ASSESSMENT — ENCOUNTER SYMPTOMS
NAUSEA: 0
WHEEZING: 0
ACTIVITY CHANGE: 0
FATIGUE: 0
ADENOPATHY: 0
EYE DISCHARGE: 0
FEVER: 0
HEADACHES: 0
ARTHRALGIAS: 0
EYE REDNESS: 0
MYALGIAS: 0
DIARRHEA: 0
COUGH: 0
SINUS PRESSURE: 0
RHINORRHEA: 1
FACIAL SWELLING: 0
CHEST TIGHTNESS: 0
EYE ITCHING: 0
SHORTNESS OF BREATH: 0
VOMITING: 0
JOINT SWELLING: 0

## 2023-04-25 NOTE — PROGRESS NOTES
Verbal consent was obtained prior to procedure by the provider. Per provider verbal order, placed Adult Environmental Panel scratch test. Once antigens were placed, patient was monitored for 15 minutes in clinic. Provider read test after 15 minutes.. Patient tolerated procedure well. All questions and concerns were addressed at office visit by the provider.     Venancio RAMSEY RN, BSN

## 2023-04-25 NOTE — PROGRESS NOTES
Jaun Lobo was seen in the Allergy Clinic at [facility].    Jaun Lobo is a 48 year old male being seen today at the request of Michelle Matos MD [facility]  in consultation for Chronic cough.    For the last 2 years he has had a significant debilitating cough primarily in late summer or early fall.  [place] due to the severity of his cough.  He has been to the emergency department on 1 occasion and urgent care on 2 different occasions.  He finds it very problematic to sleep because of the persistent cough.  He has been on prednisone on 1 occasion which did provide benefit.    He had an Arnuity inhaler prescribed in the past which was not beneficial.  He did see a pulmonologist in January.  A chest x-ray and pulmonary function testing was normal.  He does find Singulair to be helpful as well as Claritin and Flonase.  Zyrtec was not helpful.    In the past he was on allergy shots back in eve high and high school.  He has not been on allergy shots for numerous decades.  He did find it beneficial at that time.    Based on his chart it does have evidence of a diagnosis of ankylosing spondylitis.      Past Medical History:   Diagnosis Date     Alcohol dependence (H)      Allergic rhinitis, cause unspecified     allergy shots as kid     Anxiety state, unspecified     on Maryellen's     Back disorder     degenerative      DDD (degenerative disc disease)      Esophagitis      Lichen planus   2007     MEDICAL HISTORY OF - 3/10/10    ulcerative esophagitis     Mild intermittent asthma     rare use of albuteral     Tobacco use disorder     quit 2002     Unspecified hemorrhoids without mention of complication      Family History   Problem Relation Age of Onset     Cerebrovascular Disease Maternal Grandfather         had strokes in his 80's     Gastrointestinal Disease Mother          ulcerative colitis     Anxiety Disorder Mother      Glaucoma Paternal Grandmother      Macular Degeneration Paternal Grandmother      Cancer Other         esophageal cancer, cousin     C.A.D. No family hx of      Diabetes No family hx of      Hypertension No family hx of      Breast Cancer No family hx of      Cancer - colorectal No family hx of      Prostate Cancer No family hx of      Unknown/Adopted No family hx of      Depression No family hx of      Schizophrenia No family hx of      Bipolar Disorder No family hx of      Suicide No family hx of      Substance Abuse No family hx of      Dementia No family hx of      Alva Disease No family hx of      Parkinsonism No family hx of      Autism Spectrum Disorder No family hx of      Intellectual Disability (Mental Retardation) No family hx of      Mental Illness No family hx of      Past Surgical History:   Procedure Laterality Date     CL AFF SURGICAL PATHOLOGY       ESOPHAGOSCOPY, GASTROSCOPY, DUODENOSCOPY (EGD), COMBINED  5/9/2014    Procedure: COMBINED ESOPHAGOSCOPY, GASTROSCOPY, DUODENOSCOPY (EGD), BIOPSY SINGLE OR MULTIPLE;  Surgeon: Yanely Macias MD;  Location: [facility]     wisdom teeth       ZZC NONSPECIFIC PROCEDURE      nasal fx; left clavic fx     ZZC NONSPECIFIC PROCEDURE  12/04    normal colonoscopy; rpt age 49       ENVIRONMENTAL HISTORY:   Pets inside the house include 1 cat(s).  Do you smoke cigarettes or other recreational drugs? No There is/are 0 smokers living in the house. The house does not have a damp basement.     SOCIAL HISTORY:   Jaun works from home. He lives with his family.      Review of Systems   Constitutional: Negative for activity change, fatigue and fever.   HENT: Positive for rhinorrhea. Negative for congestion, dental problem, ear pain, facial swelling, nosebleeds, postnasal drip, sinus pressure and sneezing.    Eyes: Negative for discharge, redness and itching.   Respiratory: Negative for cough, chest tightness,  shortness of breath and wheezing.    Cardiovascular: Negative for chest pain.   Gastrointestinal: Negative for diarrhea, nausea and vomiting.   Musculoskeletal: Negative for arthralgias, joint swelling and myalgias.   Skin: Negative for rash.   Neurological: Negative for headaches.   Hematological: Negative for adenopathy.   Psychiatric/Behavioral: Negative for behavioral problems and self-injury.         Current Outpatient Medications:      albuterol (PROAIR HFA/PROVENTIL HFA/VENTOLIN HFA) 108 (90 Base) MCG/ACT inhaler, Inhale 2 puffs into the lungs every 4 hours as needed for shortness of breath / dyspnea or wheezing, Disp: 18 g, Rfl: 1     budesonide-formoterol (SYMBICORT) 160-4.5 MCG/ACT Inhaler, Inhale 2 puffs into the lungs 2 times daily, Disp: 10.2 g, Rfl: 4     fluticasone (FLONASE) 50 MCG/ACT nasal spray, INSTILL 2 SPRAYS INTO BOTH NOSTRILS DAILY, Disp: 16 mL, Rfl: 3     hydrOXYzine (ATARAX) 25 MG tablet, Take 1 tablet (25 mg) by mouth 3 times daily as needed for anxiety, Disp: 60 tablet, Rfl: 0     melatonin 3 MG tablet, Take 3 mg by mouth nightly as needed., Disp: , Rfl:      meloxicam (MOBIC) 15 MG tablet, Take 1 tablet by mouth daily at 2 pm, Disp: , Rfl:      montelukast (SINGULAIR) 10 MG tablet, Take 1 tablet (10 mg) by mouth At Bedtime, Disp: 90 tablet, Rfl: 3     multivitamin w/minerals (THERA-VIT-M) tablet, Take 1 tablet by mouth daily, Disp: , Rfl:      temazepam (RESTORIL) 15 MG capsule, Take 1/2 to 1 tablet PO at bedtime as needed for sleep, 1 year script, Disp: 72 capsule, Rfl: 0     traMADol (ULTRAM) 50 MG tablet, Take 50 mg by mouth every 6 hours as needed for severe pain, Disp: , Rfl:      acetaminophen (TYLENOL) 325 MG tablet, Take 975 mg (3 tablets) by mouth 3 times daily for 3 days, then take 650 mg (2 tablets) by mouth every 6 hours as needed for mild- moderate pain. (Patient not taking: Reported on 3/3/2023), Disp: 60 tablet, Rfl: 0     acetaminophen (TYLENOL) 500 MG tablet, Take 1-2  tablets (500-1,000 mg) by mouth every 4 hours as needed for mild pain (Patient not taking: Reported on 3/3/2023), Disp: 30 tablet, Rfl: 0     cetirizine (ZYRTEC) 10 MG tablet, Take 1 tablet (10 mg) by mouth daily (Patient not taking: Reported on 3/3/2023), Disp: 90 tablet, Rfl: 3     Ibuprofen (ADVIL PO), , Disp: , Rfl:      naloxone (NARCAN) 4 MG/0.1ML nasal spray, Spray 1 spray (4 mg) into one nostril alternating nostrils once as needed for opioid reversal every 2-3 minutes until assistance arrives (Patient not taking: Reported on 3/3/2023), Disp: 0.2 mL, Rfl: 0     temazepam (RESTORIL) 7.5 MG capsule, , Disp: , Rfl:   Allergies   Allergen Reactions     Antabuse [Disulfiram] Other (See Comments)     hepatitis     Campral [Acamprosate] Other (See Comments)     Mood changes     Animal Dander      Dust Mite Extract      No Known Drug Allergy      Ragweeds      goldenrod     Seasonal Allergies          EXAM:   /74   Pulse 69   Wt 87.6 kg (193 lb 3.2 oz)   SpO2 99%   BMI 25.49 kg/m      Physical Exam    Constitutional:       General: He is not in acute distress.     Appearance: Normal appearance. He is not ill-appearing.   HENT:      Head: Normocephalic and atraumatic.      Nose: He has mild turbinate hypertrophy bilaterally     Mouth/Throat:      Mouth: Mucous membranes are moist.      Pharynx: Oropharynx is clear. No posterior oropharyngeal erythema.   Eyes:      General:         Right eye: No discharge.         Left eye: No discharge.   Cardiovascular:      Rate and Rhythm: Normal rate and regular rhythm.      Heart sounds: Normal heart sounds.   Pulmonary:      Effort: Pulmonary effort is normal.      Breath sounds: Normal breath sounds. No wheezing or rhonchi.   Skin:     General: Skin is warm.      Findings: No erythema or rash.   Neurological:      General: No focal deficit present.      Mental Status: He is alert. Mental status is at baseline.   Psychiatric:         Mood and Affect: Mood normal.          Behavior: Behavior normal.     WORKUP: Skin testing was positive to dust mites, dog and cat    ENVIRONMENTAL PERCUTANEOUS SKIN TESTING: ADULT      4/25/2023    11:10 AM   [facility] [employer]   Consent Y   Ordering Physician Hang   Interpreting Physician Hang   Testing Technician Venancio JONES RN   Location Back   Time start: 11:10 AM   Time End: 11:25 AM   Positive Control: Histatrol*ALK 1 mg/ml 5/30   Negative Control: 50% Glycerin 0/0   Cat Hair*ALK (10,000 BAU/ml) 4/6   AP Dog Hair/Dander (1:100 w/v) 5/6   Dust Mite p. 30,000 AU/ml 15/30   Dust Mite f. (30,000 AU/ml) 15/30   Nico (W/F in millimeters) 0   Tejas Grass (100,000 BAU/mL) 0   Red Cedar (W/F in millimeters) 0   Maple/Red Willow (W/F in millimeters) 0   Hackberry (W/F in millimeters) 0   Emmet (W/F in millimeters) 0   Lampasas *ALK (W/F in millimeters) 0   American Elm (W/F in millimeters) 0   Bergoo (W/F in millimeters) 0   Black [place] (W/F in millimeters) 0   Birch Mix (W/F in millimeters) 0   Lake (W/F in millimeters) 0   Oak (W/F in millimeters) 0   Cocklebur (W/F in millimeters) 0   Albany (W/F in millimeters) 0   White Franky (W/F in millimeters) 0   Careless (W/F in millimeters) 0   Nettle (W/F in millimeters) 0   English Plantain (W/F in millimeters) 0   Kochia (W/F in millimeters) 0   Lamb's Quarter (W/F in millimeters) 0   Marshelder (W/F in millimeters) 0   Ragweed Mix* ALK (W/F in millimeters) 0   Russian Thistle (W/F in millimeters) 0   Sagebrush/Mugwort (W/F in millimeters) 0   Sheep Sorrel (W/F in millimeters) 0   Feather Mix* ALK (W/F in millimeters) 0   Penicillium Mix (1:10 w/v) 0   Curvularia spicifera (1:10 w/v) 0   Epicoccum (1:10 w/v) 0   Aspergillus fumigatus (1:10 w/v): 0   Alternaria tenius (1:10 w/v) 0   H. Cladosporium (1:10 w/v) 0   Phoma herbarum (1:10 w/v) 0        ASSESSMENT/PLAN:  Jaun Lobo is a 48 year old male seen today for cough and allergic rhinitis.  Skin testing was positive to dust mite, dog  and cat.  His history is more consistent with a grass, weed or mold allergy.  We will need to do further evaluation.  Pulmonologist considered asthma, allergic rhinitis and reflux as potential contributing factors.  Pulmonary function testing was normal.    1. Continue Singulair 10 mg, Claritin 10 or 20 allergic grams, as well as Flonase 2 sprays each nostril daily during the time of the year where your symptoms are most problematic.  2. We will also prescribe Symbicort 2 puffs twice daily as needed if the cough develops.  3. We will check labs for additional allergens.  4. Dust mite avoidance was recommended and handout provided.  5. Depending on the results of his blood test may consider allergy immunotherapy.    Follow-up in 2 to 4 weeks.      Thank you for allowing me to participate in the care of Jaun Lobo.      I spent 40 minutes on the date of the encounter doing chart review, history and exam, documentation and further coordination as noted above exclusive of separately reported interpretations    Huy Mauricio MD  Allergy/Immunology  [facility]

## 2023-04-25 NOTE — PATIENT INSTRUCTIONS
Continue Singulair 10 mg, Claritin 10 or 20 allergic grams, as well as Flonase 2 sprays each nostril daily during the time of the year where your symptoms are most problematic.  We will also prescribe Symbicort 2 puffs twice daily as needed if the cough develops.  We will check labs for additional allergens.  Dust mite avoidance was recommended and handout provided.      Allergy Staff Appt Hours Shot Hours Location       Physician   Huy Mauricio MD      Support Staff   ANDRES Juarez, ANDRES ALFARO, LEILANI IZAGUIRRE, ASYA      Mondays Tuesdays Thursdays and Fridays:  Roxanne 7-5 Wednesdays  Close                Mondays, Tuesdays and Fridays:  7:40 - 3:20              [facility]  [address] [facility] [facility] [address]  [place] [address]  Appt Line: [phone]    Pulmonary Function Scheduling:  [place]: [phone]           Questions about cost of your care  For questions about your cost of your visit, procedure, lab or imaging contact:  [employer] [place] Consumer Price Line [phone] or visit:  [email or web address]

## 2023-04-25 NOTE — LETTER
4/25/2023         RE: Jaun Lobo  [address]  [place] [address]        Dear Colleague,    Thank you for referring your patient, Jaun Lobo, to the [facility]. Please see a copy of my visit note below.    Jaun Lobo was seen in the Allergy Clinic at [facility].    Jaun Lobo is a 48 year old male being seen today at the request of Michelle Matos MD [facility]  in consultation for Chronic cough.    For the last 2 years he has had a significant debilitating cough primarily in late summer or early fall.  [place] due to the severity of his cough.  He has been to the emergency department on 1 occasion and urgent care on 2 different occasions.  He finds it very problematic to sleep because of the persistent cough.  He has been on prednisone on 1 occasion which did provide benefit.    He had an Arnuity inhaler prescribed in the past which was not beneficial.  He did see a pulmonologist in January.  A chest x-ray and pulmonary function testing was normal.  He does find Singulair to be helpful as well as Claritin and Flonase.  Zyrtec was not helpful.    In the past he was on allergy shots back in eve high and high school.  He has not been on allergy shots for numerous decades.  He did find it beneficial at that time.    Based on his chart it does have evidence of a diagnosis of ankylosing spondylitis.      Past Medical History:   Diagnosis Date     Alcohol dependence (H)      Allergic rhinitis, cause unspecified     allergy shots as kid     Anxiety state, unspecified     on [place]'s     Back disorder     degenerative      DDD (degenerative disc disease)      Esophagitis      Lichen planus   2007     MEDICAL HISTORY OF - 3/10/10    ulcerative esophagitis     Mild intermittent asthma     rare use of albuteral     Tobacco use disorder      quit 2002     Unspecified hemorrhoids without mention of complication      Family History   Problem Relation Age of Onset     Cerebrovascular Disease Maternal Grandfather         had strokes in his 80's     Gastrointestinal Disease Mother         ulcerative colitis     Anxiety Disorder Mother      Glaucoma Paternal Grandmother      Macular Degeneration Paternal Grandmother      Cancer Other         esophageal cancer, cousin     C.A.D. No family hx of      Diabetes No family hx of      Hypertension No family hx of      Breast Cancer No family hx of      Cancer - colorectal No family hx of      Prostate Cancer No family hx of      Unknown/Adopted No family hx of      Depression No family hx of      Schizophrenia No family hx of      Bipolar Disorder No family hx of      Suicide No family hx of      Substance Abuse No family hx of      Dementia No family hx of      Hitchcock Disease No family hx of      Parkinsonism No family hx of      Autism Spectrum Disorder No family hx of      Intellectual Disability (Mental Retardation) No family hx of      Mental Illness No family hx of      Past Surgical History:   Procedure Laterality Date     CL AFF SURGICAL PATHOLOGY       ESOPHAGOSCOPY, GASTROSCOPY, DUODENOSCOPY (EGD), COMBINED  5/9/2014    Procedure: COMBINED ESOPHAGOSCOPY, GASTROSCOPY, DUODENOSCOPY (EGD), BIOPSY SINGLE OR MULTIPLE;  Surgeon: Yanely Macias MD;  Location: [facility]     wisdom teeth       ZZC NONSPECIFIC PROCEDURE      nasal fx; left clavic fx     ZZC NONSPECIFIC PROCEDURE  12/04    normal colonoscopy; rpt age 49       ENVIRONMENTAL HISTORY:   Pets inside the house include 1 cat(s).  Do you smoke cigarettes or other recreational drugs? No There is/are 0 smokers living in the house. The house does not have a damp basement.     SOCIAL HISTORY:   Jaun works from home. He lives with his family.      Review of Systems   Constitutional: Negative for activity change, fatigue and fever.   HENT:  Positive for rhinorrhea. Negative for congestion, dental problem, ear pain, facial swelling, nosebleeds, postnasal drip, sinus pressure and sneezing.    Eyes: Negative for discharge, redness and itching.   Respiratory: Negative for cough, chest tightness, shortness of breath and wheezing.    Cardiovascular: Negative for chest pain.   Gastrointestinal: Negative for diarrhea, nausea and vomiting.   Musculoskeletal: Negative for arthralgias, joint swelling and myalgias.   Skin: Negative for rash.   Neurological: Negative for headaches.   Hematological: Negative for adenopathy.   Psychiatric/Behavioral: Negative for behavioral problems and self-injury.         Current Outpatient Medications:      albuterol (PROAIR HFA/PROVENTIL HFA/VENTOLIN HFA) 108 (90 Base) MCG/ACT inhaler, Inhale 2 puffs into the lungs every 4 hours as needed for shortness of breath / dyspnea or wheezing, Disp: 18 g, Rfl: 1     budesonide-formoterol (SYMBICORT) 160-4.5 MCG/ACT Inhaler, Inhale 2 puffs into the lungs 2 times daily, Disp: 10.2 g, Rfl: 4     fluticasone (FLONASE) 50 MCG/ACT nasal spray, INSTILL 2 SPRAYS INTO BOTH NOSTRILS DAILY, Disp: 16 mL, Rfl: 3     hydrOXYzine (ATARAX) 25 MG tablet, Take 1 tablet (25 mg) by mouth 3 times daily as needed for anxiety, Disp: 60 tablet, Rfl: 0     melatonin 3 MG tablet, Take 3 mg by mouth nightly as needed., Disp: , Rfl:      meloxicam (MOBIC) 15 MG tablet, Take 1 tablet by mouth daily at 2 pm, Disp: , Rfl:      montelukast (SINGULAIR) 10 MG tablet, Take 1 tablet (10 mg) by mouth At Bedtime, Disp: 90 tablet, Rfl: 3     multivitamin w/minerals (THERA-VIT-M) tablet, Take 1 tablet by mouth daily, Disp: , Rfl:      temazepam (RESTORIL) 15 MG capsule, Take 1/2 to 1 tablet PO at bedtime as needed for sleep, 1 year script, Disp: 72 capsule, Rfl: 0     traMADol (ULTRAM) 50 MG tablet, Take 50 mg by mouth every 6 hours as needed for severe pain, Disp: , Rfl:      acetaminophen (TYLENOL) 325 MG tablet, Take 975 mg  (3 tablets) by mouth 3 times daily for 3 days, then take 650 mg (2 tablets) by mouth every 6 hours as needed for mild- moderate pain. (Patient not taking: Reported on 3/3/2023), Disp: 60 tablet, Rfl: 0     acetaminophen (TYLENOL) 500 MG tablet, Take 1-2 tablets (500-1,000 mg) by mouth every 4 hours as needed for mild pain (Patient not taking: Reported on 3/3/2023), Disp: 30 tablet, Rfl: 0     cetirizine (ZYRTEC) 10 MG tablet, Take 1 tablet (10 mg) by mouth daily (Patient not taking: Reported on 3/3/2023), Disp: 90 tablet, Rfl: 3     Ibuprofen (ADVIL PO), , Disp: , Rfl:      naloxone (NARCAN) 4 MG/0.1ML nasal spray, Spray 1 spray (4 mg) into one nostril alternating nostrils once as needed for opioid reversal every 2-3 minutes until assistance arrives (Patient not taking: Reported on 3/3/2023), Disp: 0.2 mL, Rfl: 0     temazepam (RESTORIL) 7.5 MG capsule, , Disp: , Rfl:   Allergies   Allergen Reactions     Antabuse [Disulfiram] Other (See Comments)     hepatitis     Campral [Acamprosate] Other (See Comments)     Mood changes     Animal Dander      Dust Mite Extract      No Known Drug Allergy      Ragweeds      goldenrod     Seasonal Allergies          EXAM:   /74   Pulse 69   Wt 87.6 kg (193 lb 3.2 oz)   SpO2 99%   BMI 25.49 kg/m      Physical Exam    Constitutional:       General: He is not in acute distress.     Appearance: Normal appearance. He is not ill-appearing.   HENT:      Head: Normocephalic and atraumatic.      Nose: He has mild turbinate hypertrophy bilaterally     Mouth/Throat:      Mouth: Mucous membranes are moist.      Pharynx: Oropharynx is clear. No posterior oropharyngeal erythema.   Eyes:      General:         Right eye: No discharge.         Left eye: No discharge.   Cardiovascular:      Rate and Rhythm: Normal rate and regular rhythm.      Heart sounds: Normal heart sounds.   Pulmonary:      Effort: Pulmonary effort is normal.      Breath sounds: Normal breath sounds. No wheezing or  rhonchi.   Skin:     General: Skin is warm.      Findings: No erythema or rash.   Neurological:      General: No focal deficit present.      Mental Status: He is alert. Mental status is at baseline.   Psychiatric:         Mood and Affect: Mood normal.         Behavior: Behavior normal.     WORKUP: Skin testing was positive to dust mites, dog and cat    ENVIRONMENTAL PERCUTANEOUS SKIN TESTING: ADULT      4/25/2023    11:10 AM   [facility]   Consent Y   Ordering Physician Hang   Interpreting Physician Hang   Testing Technician Venancio JONES RN   Location Back   Time start: 11:10 AM   Time End: 11:25 AM   Positive Control: Histatrol*ALK 1 mg/ml 5/30   Negative Control: 50% Glycerin 0/0   Cat Hair*ALK (10,000 BAU/ml) 4/6   AP Dog Hair/Dander (1:100 w/v) 5/6   Dust Mite p. 30,000 AU/ml 15/30   Dust Mite f. (30,000 AU/ml) 15/30   Nico (W/F in millimeters) 0   Tejas Grass (100,000 BAU/mL) 0   Red Cedar (W/F in millimeters) 0   Maple/[place] (W/F in millimeters) 0   Hackberry (W/F in millimeters) 0   Badin (W/F in millimeters) 0   Rockbridge Baths *ALK (W/F in millimeters) 0   American Elm (W/F in millimeters) 0   G[place] (W/F in millimeters) 0   Black [place] (W/F in millimeters) 0   Birch Mix (W/F in millimeters) 0   Conyers (W/F in millimeters) 0   Oak (W/F in millimeters) 0   Cocklebur (W/F in millimeters) 0   Pleasant Hill (W/F in millimeters) 0   White Franky (W/F in millimeters) 0   Careless (W/F in millimeters) 0   Nettle (W/F in millimeters) 0   English Plantain (W/F in millimeters) 0   Kochia (W/F in millimeters) 0   Lamb's Quarter (W/F in millimeters) 0   Marshelder (W/F in millimeters) 0   Ragweed Mix* ALK (W/F in millimeters) 0   Russian Thistle (W/F in millimeters) 0   Sagebrush/Mugwort (W/F in millimeters) 0   Sheep Sorrel (W/F in millimeters) 0   Feather Mix* ALK (W/F in millimeters) 0   Penicillium Mix (1:10 w/v) 0   Curvularia spicifera (1:10 w/v) 0   Epicoccum (1:10 w/v) 0   Aspergillus fumigatus  (1:10 w/v): 0   Alternaria tenius (1:10 w/v) 0   H. Cladosporium (1:10 w/v) 0   Phoma herbarum (1:10 w/v) 0        ASSESSMENT/PLAN:  Jaun Lobo is a 48 year old male seen today for cough and allergic rhinitis.  Skin testing was positive to dust mite, dog and cat.  His history is more consistent with a grass, weed or mold allergy.  We will need to do further evaluation.  Pulmonologist considered asthma, allergic rhinitis and reflux as potential contributing factors.  Pulmonary function testing was normal.    1. Continue Singulair 10 mg, Claritin 10 or 20 allergic grams, as well as Flonase 2 sprays each nostril daily during the time of the year where your symptoms are most problematic.  2. We will also prescribe Symbicort 2 puffs twice daily as needed if the cough develops.  3. We will check labs for additional allergens.  4. Dust mite avoidance was recommended and handout provided.  5. Depending on the results of his blood test may consider allergy immunotherapy.    Follow-up in 2 to 4 weeks.      Thank you for allowing me to participate in the care of Jaun Lobo.      I spent 40 minutes on the date of the encounter doing chart review, history and exam, documentation and further coordination as noted above exclusive of separately reported interpretations    Huy Mauricio MD  Allergy/Immunology  [facility]      Verbal consent was obtained prior to procedure by the provider. Per provider verbal order, placed Adult Environmental Panel scratch test. Once antigens were placed, patient was monitored for 15 minutes in clinic. Provider read test after 15 minutes.. Patient tolerated procedure well. All questions and concerns were addressed at office visit by the provider.     Venancio RAMSEY RN, BSN      Again, thank you for allowing me to participate in the care of your patient.        Sincerely,        Huy Mauricio MD

## 2023-04-26 LAB
A ALTERNATA IGE QN: <0.1 KU(A)/L
A FUMIGATUS IGE QN: <0.1 KU(A)/L
C HERBARUM IGE QN: <0.1 KU(A)/L
COMMON RAGWEED IGE QN: <0.1 KU(A)/L
E PURPURASCENS IGE QN: <0.1 KU(A)/L
ENGL PLANTAIN IGE QN: <0.1 KU(A)/L
FIREBUSH IGE QN: <0.1 KU(A)/L
GIANT RAGWEED IGE QN: <0.1 KU(A)/L
GOOSEFOOT IGE QN: <0.1 KU(A)/L
MUGWORT IGE QN: <0.1 KU(A)/L
NETTLE IGE QN: <0.1 KU(A)/L
P NOTATUM IGE QN: <0.1 KU(A)/L
SALTWORT IGE QN: <0.1 KU(A)/L
SHEEP SORREL IGE QN: <0.1 KU(A)/L
TIMOTHY IGE QN: <0.1 KU(A)/L

## 2023-04-26 NOTE — TELEPHONE ENCOUNTER
"Dr. Ny-Please review and sign if agree.    Team Coordinators-Please fax Spine Referral to [facility] and notify patient when completed.    \"Hi Dr. Ny,     You may recall that in January  your office provided a referral so that I could consult with a stem cell therapy physician at the [facility].  I met with him two weeks ago and he suggested that I first be seen in the Spine Center.  In particular, he recommended  Dr. Dockery in [place].  I'm assuming I'd need a new referral to see Dr. Dockery, correct?  Is there any way your office could send a referral?  Here is the Spine Center info:     [facility]  [facility] [address]  [address] [place] [address]  Phone: [phone]     Jaun Gallegos\"      Thank you!  NEELAM DonovanN, RN-[facility]    "

## 2023-04-27 ENCOUNTER — OFFICE VISIT (OUTPATIENT)
Dept: FAMILY MEDICINE | Facility: CLINIC | Age: 48
End: 2023-04-27
Payer: COMMERCIAL

## 2023-04-27 VITALS
WEIGHT: 194 LBS | SYSTOLIC BLOOD PRESSURE: 108 MMHG | BODY MASS INDEX: 24.9 KG/M2 | DIASTOLIC BLOOD PRESSURE: 71 MMHG | HEIGHT: 74 IN | TEMPERATURE: 97.9 F | HEART RATE: 68 BPM | RESPIRATION RATE: 15 BRPM | OXYGEN SATURATION: 99 %

## 2023-04-27 DIAGNOSIS — F41.1 GAD (GENERALIZED ANXIETY DISORDER): ICD-10-CM

## 2023-04-27 DIAGNOSIS — G47.09 OTHER INSOMNIA: ICD-10-CM

## 2023-04-27 DIAGNOSIS — F41.1 ANXIETY STATE: ICD-10-CM

## 2023-04-27 DIAGNOSIS — F42.9 OBSESSIVE-COMPULSIVE DISORDER, UNSPECIFIED TYPE: ICD-10-CM

## 2023-04-27 DIAGNOSIS — R45.4 ANGER: Primary | ICD-10-CM

## 2023-04-27 PROCEDURE — 99214 OFFICE O/P EST MOD 30 MIN: CPT | Performed by: STUDENT IN AN ORGANIZED HEALTH CARE EDUCATION/TRAINING PROGRAM

## 2023-04-27 RX ORDER — ESCITALOPRAM OXALATE 5 MG/1
TABLET ORAL
Qty: 63 TABLET | Refills: 0 | Status: SHIPPED | OUTPATIENT
Start: 2023-04-27 | End: 2023-06-30

## 2023-04-27 RX ORDER — HYDROXYZINE HYDROCHLORIDE 25 MG/1
25 TABLET, FILM COATED ORAL 3 TIMES DAILY PRN
Qty: 60 TABLET | Refills: 0 | Status: SHIPPED | OUTPATIENT
Start: 2023-04-27 | End: 2023-06-30

## 2023-04-27 ASSESSMENT — PATIENT HEALTH QUESTIONNAIRE - PHQ9
SUM OF ALL RESPONSES TO PHQ QUESTIONS 1-9: 11
10. IF YOU CHECKED OFF ANY PROBLEMS, HOW DIFFICULT HAVE THESE PROBLEMS MADE IT FOR YOU TO DO YOUR WORK, TAKE CARE OF THINGS AT HOME, OR GET ALONG WITH OTHER PEOPLE: SOMEWHAT DIFFICULT
SUM OF ALL RESPONSES TO PHQ QUESTIONS 1-9: 11

## 2023-04-27 ASSESSMENT — ANXIETY QUESTIONNAIRES
7. FEELING AFRAID AS IF SOMETHING AWFUL MIGHT HAPPEN: MORE THAN HALF THE DAYS
4. TROUBLE RELAXING: MORE THAN HALF THE DAYS
2. NOT BEING ABLE TO STOP OR CONTROL WORRYING: NEARLY EVERY DAY
7. FEELING AFRAID AS IF SOMETHING AWFUL MIGHT HAPPEN: MORE THAN HALF THE DAYS
GAD7 TOTAL SCORE: 16
6. BECOMING EASILY ANNOYED OR IRRITABLE: NEARLY EVERY DAY
8. IF YOU CHECKED OFF ANY PROBLEMS, HOW DIFFICULT HAVE THESE MADE IT FOR YOU TO DO YOUR WORK, TAKE CARE OF THINGS AT HOME, OR GET ALONG WITH OTHER PEOPLE?: SOMEWHAT DIFFICULT
IF YOU CHECKED OFF ANY PROBLEMS ON THIS QUESTIONNAIRE, HOW DIFFICULT HAVE THESE PROBLEMS MADE IT FOR YOU TO DO YOUR WORK, TAKE CARE OF THINGS AT HOME, OR GET ALONG WITH OTHER PEOPLE: SOMEWHAT DIFFICULT
1. FEELING NERVOUS, ANXIOUS, OR ON EDGE: NEARLY EVERY DAY
GAD7 TOTAL SCORE: 16
3. WORRYING TOO MUCH ABOUT DIFFERENT THINGS: NEARLY EVERY DAY
5. BEING SO RESTLESS THAT IT IS HARD TO SIT STILL: NOT AT ALL
GAD7 TOTAL SCORE: 16

## 2023-04-27 ASSESSMENT — PAIN SCALES - GENERAL: PAINLEVEL: NO PAIN (0)

## 2023-04-27 NOTE — PROGRESS NOTES
Assessment & Plan     Anger  Anxiety state  KEVIN (generalized anxiety disorder)  Obsessive-compulsive disorder, unspecified type  Long standing history of OCD and anxiety. OCD behaviors under control, but still has impulses which are bothersome. mostly centered around cleaning/germs. Symptoms worsened over the last 3 months. No precipitating event. Very irritable and angry which is new. No depression or safety concerns. Will start SSRI as this will help with irritability, anxiety and OCD symptoms.    Previous med trials:  -paxil-made insomnia worse  -prozac-felt good  -wellbutrin-helped (2007)    Plan:  -start lexapro 5mg daily x 1 week, then increase to 10mg daily  -hydroxyzine PRN (counseled to not take with temazepam and tramadol)  -therapy referral  -follow up in 1 month    - Adult Mental Health  Referral; Future  - escitalopram (LEXAPRO) 5 MG tablet; Take 1 tablet (5 mg) by mouth daily for 7 days, THEN 2 tablets (10 mg) daily for 28 days.  - hydrOXYzine (ATARAX) 25 MG tablet; Take 1 tablet (25 mg) by mouth 3 times daily as needed for anxiety    Other insomnia  Worsened by above symptoms. Already on temazepam (on for 20yrs) and melatonin. Will monitor for improvement with starting lexapro.      I spent a total of 32 minutes on the day of the visit.   Time spent by me doing chart review, history and exam, documentation and further activities per the note       Depression Screening Follow Up      4/27/2023     4:01 PM   PHQ   PHQ-9 Total Score 11   Q9: Thoughts of better off dead/self-harm past 2 weeks Not at all       Follow Up Actions Taken  Crisis resource information provided in After Visit Summary  Mental Health Referral placed  Started patient on anti-depressant.       Neymar Stroud, [facility]    Dayanna Zamorano is a 48 year old, presenting for the following health issues:  MOOD CHANGES        4/27/2023     4:16 PM   Additional Questions   Roomed by heena bosch    Accompanied by none         4/27/2023     4:16 PM   Patient Reported Additional Medications   Patient reports taking the following new medications none     History of Present Illness       Mental Health Follow-up:  Patient presents to follow-up on Depression & Anxiety.Patient's depression since last visit has been:  Worse  The patient is not having other symptoms associated with depression.  Patient's anxiety since last visit has been:  Worse  The patient is having other symptoms associated with anxiety.  Any significant life events: No  Patient is feeling anxious or having panic attacks.  Patient has no concerns about alcohol or drug use.    He eats 4 or more servings of fruits and vegetables daily.He consumes 0 sweetened beverage(s) daily.He exercises with enough effort to increase his heart rate 20 to 29 minutes per day.  He exercises with enough effort to increase his heart rate 5 days per week.   He is taking medications regularly.    Today's PHQ-9         PHQ-9 Total Score: 11    PHQ-9 Q9 Thoughts of better off dead/self-harm past 2 weeks :   Not at all    How difficult have these problems made it for you to do your work, take care of things at home, or get along with other people: Somewhat difficult  Today's KEVIN-7 Score: 16     Has seen psychiatrist, counselors in the past  Has not done this in the past fe years  Managing things on his own    Over the years  -anxiety  -social anxiety  -OCD  -severe in childhood  -adulthood, manageable    Currently  -feels social anxiety/OCD pushing him  -3 months  -agitation, irritability-gets angry for no reason  -fight/flight reactions  -affecting wife and work  -daily    Insomnia  -chronic x 20yrs  -on chronic temazepam, not daily  -worsening  -harder to fall asleep, wakes up earlier  -suspects contributing to agitation    OCD  -good at managing it behaviorally  -not getting caught in rituals  -feels impulses  -impulses contribute to anxiety/agitation  -centered around  "germs/cleaning but also activities/work    paxil-made insomnia worse  prozac-felt good  wellbutrin-helped (2007)          5/8/2018     2:58 PM 2/22/2022     1:48 PM 4/27/2023     4:01 PM   PHQ   PHQ-9 Total Score 6 5 11   Q9: Thoughts of better off dead/self-harm past 2 weeks Not at all Not at all Not at all         3/7/2017     3:02 PM 1/16/2020     8:17 AM 4/27/2023     4:03 PM   KEVIN-7 SCORE   Total Score   16 (severe anxiety)   Total Score 6 0 16       Review of Systems   Constitutional, HEENT, cardiovascular, pulmonary, gi and gu systems are negative, except as otherwise noted.      Objective    /71 (BP Location: Right arm, Patient Position: Chair, Cuff Size: Adult Large)   Pulse 68   Temp 97.9  F (36.6  C) (Temporal)   Resp 15   Ht 1.88 m (6' 2\")   Wt 88 kg (194 lb)   SpO2 99%   BMI 24.91 kg/m    Body mass index is 24.91 kg/m .     Physical Exam  Psychiatric:         Attention and Perception: Attention normal.         Mood and Affect: Affect normal. Mood is anxious.         Speech: Speech normal.         Behavior: Behavior is cooperative.         Thought Content: Thought content normal.         Cognition and Memory: Cognition normal.         Judgment: Judgment normal.                "

## 2023-04-27 NOTE — PATIENT INSTRUCTIONS
-Start lexapro 5mg daily for 1 week. Then increase to 10mg daily.  -Hydroxyzine as needed. This can help with anxiety. It can make you sleepy.  -Therapy referral.  -Follow up in 1 month. Reach out sooner if you are not doing well.      Dr. Stroud      1) Talk therapy (also called psychotherapy, therapy, cognitive behavioral therapy). Usually these appointments are every 1-2 weeks to start and we have therapists in clinic that you can see.  2) Medications (often called antidepressants or SSRI's). These may or may not be started. Best evidence is that a combination of these options works best for depression. If meds are used, they should be continued for at least 6-12 months after you get better to prevent recurrence.  3) Regular exercise - this means getting a little sweaty, and working toward a goal of 150 minutes per week. See below for more information if your doctor prescribed exercise for you.  4) Journaling - writing regularly in a notebook especially about things you feel strongly about.  5) Light therapy if symptoms are worse in winter.    What to expect at your appointments?  Your doctor and their team will ask questions, assess for side effects, and talk about therapies to help your mood. Some common questions are like the 9 questions listed below. We will ask you these at every visit to help gauge how you are improving.    How much trouble are you having in these areas?   1) Little interest or pleasure in doing things  2) Feeling down, depressed, or hopeless  3) Trouble falling or staying asleep, or sleeping too much  4) Feeling tired or having little energy  5) Poor appetite or overeating  6) Feeling bad about yourself - or that you are a failure or have let yourself down  7) Trouble concentrating on things, such as reading the newspaper or watching television  8) Moving or speaking so slowly that other people could have noticed. Or the opposite-being fidgety or restless  9) Thoughts that you would be  better off dead, or of hurting yourself in some way      What kind of exercise should I do?  Exercises that increase your heart rate and move large muscles (such as the muscles in your legs and arms) are best. Choose an activity that you enjoy and that you can start slowly and increase gradually as you become used to it. Walking is very popular and does not require special equipment. Other good exercises include swimming, biking, jogging and dancing. Taking the stairs instead of the elevator or walking instead of driving may also be a good way to start being more active.    How long should I exercise?  Start off exercising 3 times a week for 20 minutes or more, and work up to at least 30 minutes, 5 times a week. This can include several short bouts of activity in a day. Exercising during a lunch break or on your way to do errands may help you add physical activity to a busy schedule.

## 2023-06-30 ENCOUNTER — OFFICE VISIT (OUTPATIENT)
Dept: FAMILY MEDICINE | Facility: CLINIC | Age: 48
End: 2023-06-30
Payer: COMMERCIAL

## 2023-06-30 VITALS
WEIGHT: 185 LBS | SYSTOLIC BLOOD PRESSURE: 117 MMHG | DIASTOLIC BLOOD PRESSURE: 69 MMHG | TEMPERATURE: 97.9 F | RESPIRATION RATE: 17 BRPM | HEART RATE: 61 BPM | BODY MASS INDEX: 24.52 KG/M2 | OXYGEN SATURATION: 97 % | HEIGHT: 73 IN

## 2023-06-30 DIAGNOSIS — R45.4 ANGER: ICD-10-CM

## 2023-06-30 DIAGNOSIS — F41.1 GAD (GENERALIZED ANXIETY DISORDER): ICD-10-CM

## 2023-06-30 DIAGNOSIS — F42.9 OBSESSIVE-COMPULSIVE DISORDER, UNSPECIFIED TYPE: ICD-10-CM

## 2023-06-30 PROCEDURE — 99213 OFFICE O/P EST LOW 20 MIN: CPT | Performed by: STUDENT IN AN ORGANIZED HEALTH CARE EDUCATION/TRAINING PROGRAM

## 2023-06-30 RX ORDER — ESCITALOPRAM OXALATE 10 MG/1
10 TABLET ORAL DAILY
Qty: 90 TABLET | Refills: 3 | Status: SHIPPED | OUTPATIENT
Start: 2023-06-30 | End: 2024-03-13

## 2023-06-30 RX ORDER — HYDROXYZINE HYDROCHLORIDE 25 MG/1
25-50 TABLET, FILM COATED ORAL 3 TIMES DAILY PRN
Qty: 60 TABLET | Refills: 0 | Status: SHIPPED | OUTPATIENT
Start: 2023-06-30

## 2023-06-30 RX ORDER — MELOXICAM 7.5 MG/1
1 TABLET ORAL
COMMUNITY
Start: 2022-11-17

## 2023-06-30 RX ORDER — CELECOXIB 200 MG/1
CAPSULE ORAL
COMMUNITY
Start: 2022-09-15 | End: 2023-06-30

## 2023-06-30 RX ORDER — LORAZEPAM 1 MG/1
TABLET ORAL
COMMUNITY
Start: 2022-09-02 | End: 2023-06-30

## 2023-06-30 RX ORDER — ALPRAZOLAM 0.5 MG
0.5 TABLET ORAL 3 TIMES DAILY
COMMUNITY
Start: 2023-02-24 | End: 2023-06-30

## 2023-06-30 ASSESSMENT — ANXIETY QUESTIONNAIRES
3. WORRYING TOO MUCH ABOUT DIFFERENT THINGS: MORE THAN HALF THE DAYS
GAD7 TOTAL SCORE: 11
1. FEELING NERVOUS, ANXIOUS, OR ON EDGE: NEARLY EVERY DAY
7. FEELING AFRAID AS IF SOMETHING AWFUL MIGHT HAPPEN: SEVERAL DAYS
8. IF YOU CHECKED OFF ANY PROBLEMS, HOW DIFFICULT HAVE THESE MADE IT FOR YOU TO DO YOUR WORK, TAKE CARE OF THINGS AT HOME, OR GET ALONG WITH OTHER PEOPLE?: SOMEWHAT DIFFICULT
4. TROUBLE RELAXING: SEVERAL DAYS
2. NOT BEING ABLE TO STOP OR CONTROL WORRYING: MORE THAN HALF THE DAYS
7. FEELING AFRAID AS IF SOMETHING AWFUL MIGHT HAPPEN: SEVERAL DAYS
GAD7 TOTAL SCORE: 11
6. BECOMING EASILY ANNOYED OR IRRITABLE: MORE THAN HALF THE DAYS
5. BEING SO RESTLESS THAT IT IS HARD TO SIT STILL: NOT AT ALL
IF YOU CHECKED OFF ANY PROBLEMS ON THIS QUESTIONNAIRE, HOW DIFFICULT HAVE THESE PROBLEMS MADE IT FOR YOU TO DO YOUR WORK, TAKE CARE OF THINGS AT HOME, OR GET ALONG WITH OTHER PEOPLE: SOMEWHAT DIFFICULT

## 2023-06-30 ASSESSMENT — PAIN SCALES - GENERAL: PAINLEVEL: NO PAIN (0)

## 2023-06-30 NOTE — PROGRESS NOTES
Assessment & Plan     KEVIN (generalized anxiety disorder)  Obsessive-compulsive disorder, unspecified type  Anger  Irritability and anger much improved with lexapro 10mg daily. Rarely uses hydroxyzine, makes him groggy. No other side effects. Refilled medications. Follow up as needed/yearly.  - escitalopram (LEXAPRO) 10 MG tablet; Take 1 tablet (10 mg) by mouth daily  - hydrOXYzine (ATARAX) 25 MG tablet; Take 1-2 tablets (25-50 mg) by mouth 3 times daily as needed for anxiety    Neymar Stroud, [facility] Zamorano is a 48 year old, presenting for the following health issues:   Follow Up        6/30/2023    11:36 AM   Additional Questions   Roomed by Didi BECKFORD     History of Present Illness       Mental Health Follow-up:  Patient presents to follow-up on Depression & Anxiety.Patient's depression since last visit has been:  Better  The patient is not having other symptoms associated with depression.  Patient's anxiety since last visit has been:  Better  The patient is not having other symptoms associated with anxiety.  Any significant life events: No  Patient is feeling anxious or having panic attacks.  Patient has no concerns about alcohol or drug use.    He eats 4 or more servings of fruits and vegetables daily.He consumes 0 sweetened beverage(s) daily.He exercises with enough effort to increase his heart rate 20 to 29 minutes per day.    He is taking medications regularly.  Today's KEVIN-7 Score: 11     Anger, KEVIN, OCD follow up  -lexapro 10mg  -hydroxyzine PRN  -meds not refilled since april  -has not established with therapy     Previous med trials:  -paxil-made insomnia worse  -prozac-felt good  -wellbutrin-helped (2007)    Today  -things overall improved  -feels normal on it  -hydroxyine rarely      Review of Systems   Constitutional, HEENT, cardiovascular, pulmonary, gi and gu systems are negative, except as otherwise noted.      Objective    /69 (BP  "Location: Right arm, Patient Position: Sitting, Cuff Size: Adult Regular)   Pulse 61   Temp 97.9  F (36.6  C) (Temporal)   Resp 17   Ht 1.854 m (6' 1\")   Wt 83.9 kg (185 lb)   SpO2 97%   BMI 24.41 kg/m    Body mass index is 24.41 kg/m .         5/8/2018     2:58 PM 2/22/2022     1:48 PM 4/27/2023     4:01 PM   PHQ   PHQ-9 Total Score 6 5 11   Q9: Thoughts of better off dead/self-harm past 2 weeks Not at all Not at all Not at all         1/16/2020     8:17 AM 4/27/2023     4:03 PM 6/30/2023    11:24 AM   KEVIN-7 SCORE   Total Score  16 (severe anxiety) 11 (moderate anxiety)   Total Score 0 16 11         Physical Exam  Constitutional:       General: He is not in acute distress.     Appearance: Normal appearance. He is not ill-appearing.   Eyes:      Extraocular Movements: Extraocular movements intact.      Conjunctiva/sclera: Conjunctivae normal.      Pupils: Pupils are equal, round, and reactive to light.   Neurological:      General: No focal deficit present.      Mental Status: He is alert and oriented to person, place, and time.   Psychiatric:         Mood and Affect: Mood normal.         Behavior: Behavior normal.                "

## 2023-06-30 NOTE — PATIENT INSTRUCTIONS
5 Evidence-Based Approaches:  1) Talk therapy (also called psychotherapy, therapy, cognitive behavioral therapy). Usually these appointments are every 1-2 weeks to start and we have therapists in clinic that you can see.  2) Medications (often called antidepressants or SSRI's). These may or may not be started. Best evidence is that a combination of these options works best for depression. If meds are used, they should be continued for at least 6-12 months after you get better to prevent recurrence.  3) Regular exercise - this means getting a little sweaty, and working toward a goal of 150 minutes per week. See below for more information if your doctor prescribed exercise for you.  4) Journaling - writing regularly in a notebook especially about things you feel strongly about.  5) Light therapy if symptoms are worse in winter.    What to expect at your appointments?  Your doctor and their team will ask questions, assess for side effects, and talk about therapies to help your mood. Some common questions are like the 9 questions listed below. We will ask you these at every visit to help gauge how you are improving.    How much trouble are you having in these areas?   1) Little interest or pleasure in doing things  2) Feeling down, depressed, or hopeless  3) Trouble falling or staying asleep, or sleeping too much  4) Feeling tired or having little energy  5) Poor appetite or overeating  6) Feeling bad about yourself - or that you are a failure or have let yourself down  7) Trouble concentrating on things, such as reading the newspaper or watching television  8) Moving or speaking so slowly that other people could have noticed. Or the opposite-being fidgety or restless  9) Thoughts that you would be better off dead, or of hurting yourself in some way      General Tips  What kind of exercise should I do?  Exercises that increase your heart rate and move large muscles (such as the muscles in your legs and arms) are best.  "Choose an activity that you enjoy and that you can start slowly and increase gradually as you become used to it. Walking is very popular and does not require special equipment. Other good exercises include swimming, biking, jogging and dancing. Taking the stairs instead of the elevator or walking instead of driving may also be a good way to start being more active.    How long should I exercise?  Start off exercising 3 times a week for 20 minutes or more, and work up to at least 30 minutes, 5 times a week. This can include several short bouts of activity in a day. Exercising during a lunch break or on your way to do errands may help you add physical activity to a busy schedule.       How hard do I have to exercise?  Even small amounts of exercise are better than none at all. Start with an activity you can do comfortably. You should get sweaty and a little short of breath. As you become more used to exercising, try to keep your heart rate at about 70% to 85% of your \"maximum heart rate.\"    To figure out your target heart rate, subtract your age (in years) from 220. This is your maximum heart rate. Now, to calculate your target heart rate, multiply that number by 0.70.    Check your pulse by gently resting 2 fingers on the side of your neck and counting the beats for 1 minute. Use a watch with a second hand to time the minute.      "

## 2024-01-17 ENCOUNTER — MYC MEDICAL ADVICE (OUTPATIENT)
Dept: UROLOGY | Facility: CLINIC | Age: 49
End: 2024-01-17
Payer: COMMERCIAL

## 2024-02-13 ENCOUNTER — VIRTUAL VISIT (OUTPATIENT)
Dept: UROLOGY | Facility: CLINIC | Age: 49
End: 2024-02-13
Attending: FAMILY MEDICINE
Payer: COMMERCIAL

## 2024-02-13 DIAGNOSIS — Z30.09 ENCOUNTER FOR VASECTOMY COUNSELING: ICD-10-CM

## 2024-02-13 PROCEDURE — 99213 OFFICE O/P EST LOW 20 MIN: CPT | Mod: 95 | Performed by: UROLOGY

## 2024-02-13 ASSESSMENT — PAIN SCALES - GENERAL: PAINLEVEL: NO PAIN (0)

## 2024-02-13 NOTE — NURSING NOTE
Ana is here today for colposcopy.  She had a recent pap that resulted in ASCUS with POSITIVE high risk HPV.     Informed consent was obtained.    Ana was placed in the dorsal lithotomy position. A speculum was inserted and the cervix was well visualized.  The cervix was cleaned off with a moffett swab.  The acetic acid was applied to the cervix.  Endocervical curettage was performed.  There were acetowhite lesion(s) noted at 8 and 2 o'clock.  Biopsies were taken at 8 and 2 O'clock. Silver nitrate sticks and monsel's solution was used to obtain hemostasis. The speculum was removed from the vagina. Ana tolerated the procedure very well.     A/P:    1. Abnormal cervical Papanicolaou smear, unspecified abnormal pap finding  - Long hx of abnormal pap smears, most recently ASC-US with + HPV  - discussed likelihood of needing excisional procedure in the future, but at this time given hx of cervical insufficiency (has abd cerclage in place) and desire for future pregnancy will continue to monitor closely and continue with colpo as long as it remains appropriate.       2. ASCUS with positive high risk HPV cervical  - POCT URINE PREGNANCY: negative  - COLPOSCOPY done today, biopsies and ECC done as above, will follow up pathology     I was present for the entire procedure and did the procedure with the resident.  I spent time discussing the long history of abnormal paps as documented above.  Will f/u pending biopsies.  Procedure done with Dr. Fields     Is the patient currently in the state of [place]? YES    Visit mode:VIDEO    If the visit is dropped, the patient can be reconnected by: VIDEO VISIT: Text to cell phone:   Telephone Information:   Mobile [phone]       Will anyone else be joining the visit? NO  (If patient encounters technical issues they should call [phone])    How would you like to obtain your AVS? MyChart    Are changes needed to the allergy or medication list? No    Reason for visit: Consult    Maty FISCHER

## 2024-02-13 NOTE — PROGRESS NOTES
VASECTOMY CONSULTATION NOTE  DATE OF VISIT: 2/13/2024  [place]   PATIENT NAME: Jaun Lobo    YOB: 1975      REASON FOR CONSULTATION: Mr. Jaun Lobo is a 48 year old year old gentleman who is seen today requesting a vasectomy. He has 2 children - 3 year old and 2 month old - and he wishes to have a vasectomy for birth control. His wife is in agreement with this plan.     PAST MEDICAL HISTORY:   Past Medical History:   Diagnosis Date    Alcohol dependence (H)     Allergic rhinitis, cause unspecified     allergy shots as kid    Anxiety state, unspecified     on Maryellen's    Back disorder     degenerative     DDD (degenerative disc disease)     Esophagitis     Lichen planus   2007    MEDICAL HISTORY OF - 3/10/10    ulcerative esophagitis    Mild intermittent asthma     rare use of albuteral    Tobacco use disorder     quit 2002    Unspecified hemorrhoids without mention of complication        PAST SURGICAL HISTORY:   Past Surgical History:   Procedure Laterality Date    CL AFF SURGICAL PATHOLOGY      ESOPHAGOSCOPY, GASTROSCOPY, DUODENOSCOPY (EGD), COMBINED  5/9/2014    Procedure: COMBINED ESOPHAGOSCOPY, GASTROSCOPY, DUODENOSCOPY (EGD), BIOPSY SINGLE OR MULTIPLE;  Surgeon: Yanely Macias MD;  Location: [facility]    wisdom teeth      ZZC NONSPECIFIC PROCEDURE      nasal fx; left clavic fx    ZZC NONSPECIFIC PROCEDURE  12/04    normal colonoscopy; rpt age 49       MEDICATIONS:   Current Outpatient Medications:     albuterol (PROAIR HFA/PROVENTIL HFA/VENTOLIN HFA) 108 (90 Base) MCG/ACT inhaler, Inhale 2 puffs into the lungs every 4 hours as needed for shortness of breath / dyspnea or wheezing, Disp: 18 g, Rfl: 1    budesonide-formoterol (SYMBICORT) 160-4.5 MCG/ACT Inhaler, Inhale 2 puffs into the lungs 2 times daily, Disp: 10.2 g, Rfl: 4    escitalopram (LEXAPRO) 10 MG tablet, Take 1 tablet (10 mg) by mouth daily, Disp: 90 tablet, Rfl: 3    fluticasone (FLONASE) 50 MCG/ACT nasal  spray, INSTILL 2 SPRAYS INTO BOTH NOSTRILS DAILY, Disp: 16 mL, Rfl: 3    hydrOXYzine (ATARAX) 25 MG tablet, Take 1-2 tablets (25-50 mg) by mouth 3 times daily as needed for anxiety, Disp: 60 tablet, Rfl: 0    melatonin 3 MG tablet, Take 3 mg by mouth nightly as needed., Disp: , Rfl:     meloxicam (MOBIC) 7.5 MG tablet, Take 1 tablet by mouth daily at 2 pm, Disp: , Rfl:     montelukast (SINGULAIR) 10 MG tablet, Take 1 tablet (10 mg) by mouth At Bedtime, Disp: 90 tablet, Rfl: 3    multivitamin w/minerals (THERA-VIT-M) tablet, Take 1 tablet by mouth daily, Disp: , Rfl:     naloxone (NARCAN) 4 MG/0.1ML nasal spray, Spray 1 spray (4 mg) into one nostril alternating nostrils once as needed for opioid reversal every 2-3 minutes until assistance arrives, Disp: 0.2 mL, Rfl: 0    temazepam (RESTORIL) 15 MG capsule, Take 1/2 to 1 tablet PO at bedtime as needed for sleep, 1 year script, Disp: 72 capsule, Rfl: 0    temazepam (RESTORIL) 7.5 MG capsule, Take 1 capsule (7.5 mg) by mouth nightly as needed for sleep, Disp: 30 capsule, Rfl: 0    traMADol (ULTRAM) 50 MG tablet, Take 50 mg by mouth every 6 hours as needed for severe pain, Disp: , Rfl:     ALLERGIES:   Allergies   Allergen Reactions    Antabuse [Disulfiram] Other (See Comments)     hepatitis    Campral [Acamprosate] Other (See Comments)     Mood changes    Animal Dander     Dust Mite Extract     No Known Drug Allergy     Ragweeds      goldenrod    Seasonal Allergies        FAMILY HISTORY:   Family History   Problem Relation Age of Onset    Cerebrovascular Disease Maternal Grandfather         had strokes in his 80's    Gastrointestinal Disease Mother         ulcerative colitis    Anxiety Disorder Mother     Glaucoma Paternal Grandmother     Macular Degeneration Paternal Grandmother     Cancer Other         esophageal cancer, cousin    C.A.D. No family hx of     Diabetes No family hx of     Hypertension No family hx of     Breast Cancer No family hx of     Cancer -  colorectal No family hx of     Prostate Cancer No family hx of     Unknown/Adopted No family hx of     Depression No family hx of     Schizophrenia No family hx of     Bipolar Disorder No family hx of     Suicide No family hx of     Substance Abuse No family hx of     Dementia No family hx of     Harrington Disease No family hx of     Parkinsonism No family hx of     Autism Spectrum Disorder No family hx of     Intellectual Disability (Mental Retardation) No family hx of     Mental Illness No family hx of        SOCIAL HISTORY:   Social History     Socioeconomic History    Marital status: Single     Spouse name: Not on file    Number of children: 0    Years of education: Not on file    Highest education level: Not on file   Occupational History    Occupation:       Employer: [employer]   Tobacco Use    Smoking status: Former     Passive exposure: Never    Smokeless tobacco: Never   Vaping Use    Vaping Use: Never used   Substance and Sexual Activity    Alcohol use: No     Alcohol/week: 16.7 standard drinks of alcohol     Types: 20 Cans of beer per week     Comment: 40 drinks a week , quit drinking 6 weeks ago     Drug use: No    Sexual activity: Yes     Partners: Female   Other Topics Concern    Parent/sibling w/ CABG, MI or angioplasty before 65F 55M? No   Social History Narrative    Balanced Diet - Yes    Osteoporosis Preventative measures-  Dairy servings per day: 2 and Weight bearing exercise    Regular Exercise -  Yes Describe Runs every AM for 15 minutes.    Dental Exam up - YES - Date: 05/2003    Seatbelts used - Yes    Self Testicular Exam -  Yes    Abuse: Current or Past (Physical, Sexual or Emotional)- No    Do you have any concerns about STD's -  No    Do you feel safe in your environment - Yes    Guns stored in the home - No    Sunscreen used - No    Lipids - YES - Date: Has been several years.    Glucose -  YES - Date: Has been several years.    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                         Social Determinants of Health     Financial Resource Strain: Not on file   Food Insecurity: Not on file   Transportation Needs: Not on file   Physical Activity: Not on file   Stress: Not on file   Social Connections: Not on file   Interpersonal Safety: Not on file   Housing Stability: Not on file       PHYSICAL EXAM  Patient is a 48 year old  male   Vitals: There were no vitals taken for this visit.  There is no height or weight on file to calculate BMI.  General Appearance Adult:   Alert, no acute distress, oriented  Neuro: Alert, oriented, speech and mentation normal  Psych: affect and mood normal       DIAGNOSIS: Request for sterilization    PLAN: The risks of the procedure as well as expectations for recovery and outcomes were explained in detail to him.  He was counseled on the risks for bleeding infection and pain after the procedure. We discussed the risk of post-vasectomy pain syndrome.  He was instructed to continue to use contraception until he had proven azoospermia on a semen specimen.  This would normally be collected at least 3 months after the procedure. Also discussed the rare, but possible risk of re-canalization of the vas, even after successful vasectomy with sterile semen specimen.  He was instructed to hold all anticoagulants medications for one week prior to the procedure.  It was recommended that he have someone else drive him home after his vasectomy.  In light of these risks and expectations he would like to proceed.  We are scheduling a vasectomy in the office in the near future.    Pt. Understands:  -1/1000-1/3000 risk of future pregnancy even with perfectly done vasectomy  -vasectomy is a permanent procedure    -he may cryopreserve sperm if he wishes   -1-5%  risk of post-vasectomy pain syndrome   -1-5% risk of complication, primarily infection or bleeding  - he needs to have a semen sample that shows no sperm before getting approval for unprotected intercourse.      Thank you for the kind consultation.    Time spent: 15 minutes spent on the date of the encounter doing chart review, history and exam, documentation and further activities as noted above.     Keanu Graff MD   Urology  [facility]  Clinic Phone [phone]          Virtual Visit Details    Type of service:  Video Visit     Originating Location (pt. Location): Home    Distant Location (provider location):  On-site  Platform used for Video Visit: OllieWell

## 2024-02-14 ENCOUNTER — TELEPHONE (OUTPATIENT)
Dept: UROLOGY | Facility: CLINIC | Age: 49
End: 2024-02-14
Payer: COMMERCIAL

## 2024-02-14 ENCOUNTER — MYC MEDICAL ADVICE (OUTPATIENT)
Dept: UROLOGY | Facility: CLINIC | Age: 49
End: 2024-02-14
Payer: COMMERCIAL

## 2024-02-14 NOTE — TELEPHONE ENCOUNTER
Left Voicemail (1st Attempt) for the patient to call back and schedule the following:    Appointment type: vasectomy  Provider: dr. hahn  Return date: next opening  Specialty phone number: [phone]   Additonal Notes: schedule a vasectomy     [facility]   Orthopedics, Podiatry, Sports Medicine, Ent ,Eye , Audiology, Adult Endocrine & Diabetes, Nutrition & Medication Therapy Management Specialties   [facility]

## 2024-02-14 NOTE — TELEPHONE ENCOUNTER
[facility] Call Center    Phone Message    May a detailed message be left on voicemail: no     Reason for Call: Other: Please call patient back to schedule vasectomy procedure.     Action Taken: Other: [facility]    Travel Screening: Not Applicable

## 2024-02-14 NOTE — TELEPHONE ENCOUNTER
Patient left voice message upset because he was on hold over 11 minutes and was then transferred to my surgery phone number to schedule so he feels he is getting the run around.     Fara Barnard on 2/14/2024 at 4:12 PM

## 2024-03-13 ENCOUNTER — OFFICE VISIT (OUTPATIENT)
Dept: FAMILY MEDICINE | Facility: CLINIC | Age: 49
End: 2024-03-13
Payer: COMMERCIAL

## 2024-03-13 VITALS
BODY MASS INDEX: 25.08 KG/M2 | DIASTOLIC BLOOD PRESSURE: 68 MMHG | OXYGEN SATURATION: 99 % | WEIGHT: 189.2 LBS | HEART RATE: 85 BPM | HEIGHT: 73 IN | SYSTOLIC BLOOD PRESSURE: 132 MMHG | RESPIRATION RATE: 20 BRPM | TEMPERATURE: 98.7 F

## 2024-03-13 DIAGNOSIS — Z78.9 HEPATITIS B IMMUNE: ICD-10-CM

## 2024-03-13 DIAGNOSIS — Z13.220 LIPID SCREENING: ICD-10-CM

## 2024-03-13 DIAGNOSIS — Z12.5 SCREENING FOR PROSTATE CANCER: ICD-10-CM

## 2024-03-13 DIAGNOSIS — Z00.00 ROUTINE GENERAL MEDICAL EXAMINATION AT A HEALTH CARE FACILITY: ICD-10-CM

## 2024-03-13 DIAGNOSIS — Z12.83 SKIN CANCER SCREENING: ICD-10-CM

## 2024-03-13 DIAGNOSIS — F41.1 GAD (GENERALIZED ANXIETY DISORDER): ICD-10-CM

## 2024-03-13 DIAGNOSIS — G47.00 INSOMNIA, UNSPECIFIED TYPE: ICD-10-CM

## 2024-03-13 DIAGNOSIS — Z13.1 SCREENING FOR DIABETES MELLITUS: ICD-10-CM

## 2024-03-13 DIAGNOSIS — F42.9 OBSESSIVE-COMPULSIVE DISORDER, UNSPECIFIED TYPE: ICD-10-CM

## 2024-03-13 LAB — HBA1C MFR BLD: 5.3 % (ref 0–5.6)

## 2024-03-13 PROCEDURE — 83036 HEMOGLOBIN GLYCOSYLATED A1C: CPT | Performed by: FAMILY MEDICINE

## 2024-03-13 PROCEDURE — 90750 HZV VACC RECOMBINANT IM: CPT | Performed by: FAMILY MEDICINE

## 2024-03-13 PROCEDURE — 90471 IMMUNIZATION ADMIN: CPT | Performed by: FAMILY MEDICINE

## 2024-03-13 PROCEDURE — G0103 PSA SCREENING: HCPCS | Performed by: FAMILY MEDICINE

## 2024-03-13 PROCEDURE — 80061 LIPID PANEL: CPT | Performed by: FAMILY MEDICINE

## 2024-03-13 PROCEDURE — 99396 PREV VISIT EST AGE 40-64: CPT | Mod: 25 | Performed by: FAMILY MEDICINE

## 2024-03-13 PROCEDURE — 36415 COLL VENOUS BLD VENIPUNCTURE: CPT | Performed by: FAMILY MEDICINE

## 2024-03-13 PROCEDURE — 99214 OFFICE O/P EST MOD 30 MIN: CPT | Mod: 25 | Performed by: FAMILY MEDICINE

## 2024-03-13 PROCEDURE — G0481 DRUG TEST DEF 8-14 CLASSES: HCPCS | Performed by: FAMILY MEDICINE

## 2024-03-13 RX ORDER — ESCITALOPRAM OXALATE 10 MG/1
10 TABLET ORAL DAILY
Qty: 90 TABLET | Refills: 3 | Status: SHIPPED | OUTPATIENT
Start: 2024-03-13

## 2024-03-13 RX ORDER — TEMAZEPAM 7.5 MG/1
7.5 CAPSULE ORAL
Qty: 100 CAPSULE | Refills: 0 | Status: SHIPPED | OUTPATIENT
Start: 2024-03-13

## 2024-03-13 SDOH — HEALTH STABILITY: PHYSICAL HEALTH: ON AVERAGE, HOW MANY DAYS PER WEEK DO YOU ENGAGE IN MODERATE TO STRENUOUS EXERCISE (LIKE A BRISK WALK)?: 5 DAYS

## 2024-03-13 ASSESSMENT — ASTHMA QUESTIONNAIRES
QUESTION_3 LAST FOUR WEEKS HOW OFTEN DID YOUR ASTHMA SYMPTOMS (WHEEZING, COUGHING, SHORTNESS OF BREATH, CHEST TIGHTNESS OR PAIN) WAKE YOU UP AT NIGHT OR EARLIER THAN USUAL IN THE MORNING: NOT AT ALL
ACT_TOTALSCORE: 25
ACT_TOTALSCORE: 25
QUESTION_4 LAST FOUR WEEKS HOW OFTEN HAVE YOU USED YOUR RESCUE INHALER OR NEBULIZER MEDICATION (SUCH AS ALBUTEROL): NOT AT ALL
QUESTION_5 LAST FOUR WEEKS HOW WOULD YOU RATE YOUR ASTHMA CONTROL: COMPLETELY CONTROLLED
QUESTION_1 LAST FOUR WEEKS HOW MUCH OF THE TIME DID YOUR ASTHMA KEEP YOU FROM GETTING AS MUCH DONE AT WORK, SCHOOL OR AT HOME: NONE OF THE TIME
QUESTION_2 LAST FOUR WEEKS HOW OFTEN HAVE YOU HAD SHORTNESS OF BREATH: NOT AT ALL

## 2024-03-13 ASSESSMENT — SOCIAL DETERMINANTS OF HEALTH (SDOH): HOW OFTEN DO YOU GET TOGETHER WITH FRIENDS OR RELATIVES?: ONCE A WEEK

## 2024-03-13 ASSESSMENT — PAIN SCALES - GENERAL: PAINLEVEL: NO PAIN (0)

## 2024-03-13 NOTE — NURSING NOTE
Prior to immunization administration, verified patients identity using patient s name and date of birth. Please see Immunization Activity for additional information.     Screening Questionnaire for Adult Immunization    Are you sick today?   No   Do you have allergies to medications, food, a vaccine component or latex?   No   Have you ever had a serious reaction after receiving a vaccination?   No   Do you have a long-term health problem with heart, lung, kidney, or metabolic disease (e.g., diabetes), asthma, a blood disorder, no spleen, complement component deficiency, a cochlear implant, or a spinal fluid leak?  Are you on long-term aspirin therapy?   No   Do you have cancer, leukemia, HIV/AIDS, or any other immune system problem?   No   Do you have a parent, brother, or sister with an immune system problem?   No   In the past 3 months, have you taken medications that affect  your immune system, such as prednisone, other steroids, or anticancer drugs; drugs for the treatment of rheumatoid arthritis, Crohn s disease, or psoriasis; or have you had radiation treatments?   No   Have you had a seizure, or a brain or other nervous system problem?   No   During the past year, have you received a transfusion of blood or blood    products, or been given immune (gamma) globulin or antiviral drug?   No   For women: Are you pregnant or is there a chance you could become       pregnant during the next month?   No   Have you received any vaccinations in the past 4 weeks?   No     Immunization questionnaire answers were all negative.      Patient instructed to remain in clinic for 15 minutes afterwards, and to report any adverse reactions.     Screening performed by Deb Dickey MA on 3/13/2024 at 2:08 PM.

## 2024-03-13 NOTE — PATIENT INSTRUCTIONS
Preventive Care Advice   This is general advice given by our system to help you stay healthy. However, your care team may have specific advice just for you. Please talk to your care team about your preventive care needs.  Nutrition  Eat 5 or more servings of fruits and vegetables each day.  Try wheat bread, brown rice and whole grain pasta (instead of white bread, rice, and pasta).  Get enough calcium and vitamin D. Check the label on foods and aim for 100% of the RDA (recommended daily allowance).  Lifestyle  Exercise at least 150 minutes each week   (30 minutes a day, 5 days a week).  Do muscle strengthening activities 2 days a week. These help control your weight and prevent disease.  No smoking.  Wear sunscreen to prevent skin cancer.  Have a dental exam and cleaning every 6 months.  Yearly exams  See your health care team every year to talk about:  Any changes in your health.  Any medicines your care team has prescribed.  Preventive care, family planning, and ways to prevent chronic diseases.  Shots (vaccines)   HPV shots (up to age 26), if you've never had them before.  Hepatitis B shots (up to age 59), if you've never had them before.  COVID-19 shot: Get this shot when it's due.  Flu shot: Get a flu shot every year.  Tetanus shot: Get a tetanus shot every 10 years.  Pneumococcal, hepatitis A, and RSV shots: Ask your care team if you need these based on your risk.  Shingles shot (for age 50 and up).  General health tests  Diabetes screening:  Starting at age 35, Get screened for diabetes at least every 3 years.  If you are younger than age 35, ask your care team if you should be screened for diabetes.  Cholesterol test: At age 39, start having a cholesterol test every 5 years, or more often if advised.  Bone density scan (DEXA): At age 50, ask your care team if you should have this scan for osteoporosis (brittle bones).  Hepatitis C: Get tested at least once in your life.  STIs (sexually transmitted  infections)  Before age 24: Ask your care team if you should be screened for STIs.  After age 24: Get screened for STIs if you're at risk. You are at risk for STIs (including HIV) if:  You are sexually active with more than one person.  You don't use condoms every time.  You or a partner was diagnosed with a sexually transmitted infection.  If you are at risk for HIV, ask about PrEP medicine to prevent HIV.  Get tested for HIV at least once in your life, whether you are at risk for HIV or not.  Cancer screening tests  Cervical cancer screening: If you have a cervix, begin getting regular cervical cancer screening tests at age 21. Most people who have regular screenings with normal results can stop after age 65. Talk about this with your provider.  Breast cancer scan (mammogram): If you've ever had breasts, begin having regular mammograms starting at age 40. This is a scan to check for breast cancer.  Colon cancer screening: It is important to start screening for colon cancer at age 45.  Have a colonoscopy test every 10 years (or more often if you're at risk) Or, ask your provider about stool tests like a FIT test every year or Cologuard test every 3 years.  To learn more about your testing options, visit: https://www.[employer]/543941.pdf.  For help making a decision, visit: https://bit.ly/ti86679.  Prostate cancer screening test: If you have a prostate and are age 55 to 69, ask your provider if you would benefit from a yearly prostate cancer screening test.  Lung cancer screening: If you are a current or former smoker age 50 to 80, ask your care team if ongoing lung cancer screenings are right for you.  For informational purposes only. Not to replace the advice of your health care provider. Copyright   2023 [place]. All rights reserved. Clinically reviewed by the [facility] Transitions Program. GERS 863142 - REV 01/24.    Learning About Stress  What is stress?     Stress is your  body's response to a hard situation. Your body can have a physical, emotional, or mental response. Stress is a fact of life for most people, and it affects everyone differently. What causes stress for you may not be stressful for someone else.  A lot of things can cause stress. You may feel stress when you go on a job interview, take a test, or run a race. This kind of short-term stress is normal and even useful. It can help you if you need to work hard or react quickly. For example, stress can help you finish an important job on time.  Long-term stress is caused by ongoing stressful situations or events. Examples of long-term stress include long-term health problems, ongoing problems at work, or conflicts in your family. Long-term stress can harm your health.  How does stress affect your health?  When you are stressed, your body responds as though you are in danger. It makes hormones that speed up your heart, make you breathe faster, and give you a burst of energy. This is called the fight-or-flight stress response. If the stress is over quickly, your body goes back to normal and no harm is done.  But if stress happens too often or lasts too long, it can have bad effects. Long-term stress can make you more likely to get sick, and it can make symptoms of some diseases worse. If you tense up when you are stressed, you may develop neck, shoulder, or low back pain. Stress is linked to high blood pressure and heart disease.  Stress also harms your emotional health. It can make you bucio, tense, or depressed. Your relationships may suffer, and you may not do well at work or school.  What can you do to manage stress?  You can try these things to help manage stress:   Do something active. Exercise or activity can help reduce stress. Walking is a great way to get started. Even everyday activities such as housecleaning or yard work can help.  Try yoga or marbella chi. These techniques combine exercise and meditation. You may need  some training at first to learn them.  Do something you enjoy. For example, listen to music or go to a movie. Practice your hobby or do volunteer work.  Meditate. This can help you relax, because you are not worrying about what happened before or what may happen in the future.  Do guided imagery. Imagine yourself in any setting that helps you feel calm. You can use online videos, books, or a teacher to guide you.  Do breathing exercises. For example:  From a standing position, bend forward from the waist with your knees slightly bent. Let your arms dangle close to the floor.  Breathe in slowly and deeply as you return to a standing position. Roll up slowly and lift your head last.  Hold your breath for just a few seconds in the standing position.  Breathe out slowly and bend forward from the waist.  Let your feelings out. Talk, laugh, cry, and express anger when you need to. Talking with supportive friends or family, a counselor, or a bob leader about your feelings is a healthy way to relieve stress. Avoid discussing your feelings with people who make you feel worse.  Write. It may help to write about things that are bothering you. This helps you find out how much stress you feel and what is causing it. When you know this, you can find better ways to cope.  What can you do to prevent stress?  You might try some of these things to help prevent stress:  Manage your time. This helps you find time to do the things you want and need to do.  Get enough sleep. Your body recovers from the stresses of the day while you are sleeping.  Get support. Your family, friends, and community can make a difference in how you experience stress.  Limit your news feed. Avoid or limit time on social media or news that may make you feel stressed.  Do something active. Exercise or activity can help reduce stress. Walking is a great way to get started.  Where can you learn more?  Go to https://www.healthwise.net/patiented  Enter N032 in the  "search box to learn more about \"Learning About Stress.\"  Current as of: February 26, 2023               Content Version: 13.8    5950-3436 [employer].   Care instructions adapted under license by your healthcare professional. If you have questions about a medical condition or this instruction, always ask your healthcare professional. [employer] disclaims any warranty or liability for your use of this information.      "

## 2024-03-13 NOTE — LETTER
[facility]  03/13/24  Patient: Jaun Lobo  YOB: 1975  Medical Record Number: [id number]                                                                                  Non-Opioid Controlled Substance Agreement    This is an agreement between you and your provider regarding safe and appropriate use of controlled substances prescribed by your care team. Controlled substances are?medicines that can cause physical and mental dependence (abuse).     There are strict laws about having and using these medicines. We here at [facility] are  committed to working with you in your efforts to get better. To support you in this work, we'll help you schedule regular office appointments for medicine refills. If we must cancel or change your appointment for any reason, we'll make sure you have enough medicine to last until your next appointment.     As a Provider, I will:   Listen carefully to your concerns while treating you with respect.   Recommend a treatment plan that I believe is in your best interest and may involve therapies other than medicine.    Talk with you often about the possible benefits and the risk of harm of any medicine that we prescribe for you.  Assess the safety of this medicine and check how well it works.    Provide a plan on how to taper (discontinue or go off) using this medicine if the decision is made to stop its use.      ::  As a Patient, I understand controlled substances:     Are prescribed by my care provider to help me function or work and manage my condition(s).?  Are strong medicines and can cause serious side effects.     Need to be taken exactly as prescribed.?Combining controlled substances with certain medicines or chemicals (such as illegal drugs, alcohol, sedatives, sleeping pills, and benzodiazepines) can be dangerous or even fatal.? If I stop taking my medicines suddenly, I may have severe withdrawal symptoms.     The risks, benefits,  and side effects of these medicine(s) were explained to me. I agree that:    I will take part in other treatments as advised by my care team. This may be psychiatry or counseling, physical therapy, behavioral therapy, group treatment or a referral to specialist.    I will keep all my appointments and understand this is part of the monitoring of controlled substances.?My care team may require an office visit for EVERY controlled substance refill. If I miss appointments or don t follow instructions, my care team may stop my medicine    I will take my medicines as prescribed. I will not change the dose or schedule unless my care team tells me to. There will be no refills if I run out early.      I may be asked to come to the clinic and complete a urine drug test or complete a pill count. If I don t give a urine sample or participate in a pill count, the care team may stop my medicine.    I will only receive controlled substance prescriptions from this clinic. If I am treated by another provider, I will tell them that I am taking controlled substances and that I have a treatment agreement with this provider. I will inform my [facility] care team within one business day if I am given a prescription for any controlled substance by another healthcare provider. My [facility] care team can contact other providers and pharmacists about my use of any medicines.    It is up to me to make sure that I don't run out of my medicines on weekends or holidays.?If my care team is willing to refill my prescription without a visit, I must request refills only during office hours. Refills may take up to 3 business days to process. I will use one pharmacy to fill all my controlled substance prescriptions. I will notify the clinic about any changes to my insurance or medicine availability.    I am responsible for my prescriptions. If the medicine/prescription is lost, stolen or destroyed, it will not be replaced.?I also agree  not to share controlled substance medicines with anyone.     I am aware I should not use any illegal or recreational drugs. I agree not to drink alcohol unless my care team says I can.     If I enroll in the [place] [facility] Cannabis program, I will tell my care team before my next refill.    I will tell my care team right away if I become pregnant, have a new medical problem treated outside of my regular clinic, or have a change in my medicines.     I understand that this medicine can affect my thinking, judgment and reaction time.? Alcohol and drugs affect the brain and body, which can affect the safety of my driving. Being under the influence of alcohol or drugs can affect my decision-making, behaviors, personal safety and the safety of others. Driving while impaired (DWI) can occur if a person is driving, operating or in physical control of a car, motorcycle, boat, snowmobile, ATV, motorbike, off-road vehicle or any other motor vehicle ([place] Statute 169A.20). I understand the risk if I choose to drive or operate any vehicle or machinery.    I understand that if I do not follow any of the conditions above, my prescriptions or treatment may be stopped or changed.   I agree that my provider, clinic care team and pharmacy may work with any city, state or federal law enforcement agency that investigates the misuse, sale or other diversion of my controlled medicine. I will allow my provider to discuss my care with, or share a copy of, this agreement with any other treating provider, pharmacy or emergency room where I receive care.     I have read this agreement and have asked questions about anything I did not understand.    ________________________________________________________  Patient Signature - Jaun Lobo     ___________________                   Date     ________________________________________________________  Provider Signature - Harleen Ny MD       ___________________                   Date      ________________________________________________________  Witness Signature (required if provider not present while patient signing)          ___________________                   Date

## 2024-03-13 NOTE — PROGRESS NOTES
"Preventive Care Visit  [facility]  Sheridanelisa Maria Luz Ny MD, Family Medicine  Mar 13, 2024      Assessment & Plan     Routine general medical examination at a health care facility  - up to date with colonoscopy per Jaun every 10 years, JENNIFER signed     Insomnia, unspecified type  - stable, reviewed [place]  no concerns  - CSA signed   - temazepam (RESTORIL) 7.5 MG capsule; Take 1 capsule (7.5 mg) by mouth nightly as needed for sleep One year script 3//13/2025  - [id number] - Urine Drug Confirmation Panel (Comprehensive); Future  - [id number] - Urine Drug Confirmation Panel (Comprehensive)    KEVIN (generalized anxiety disorder)  - stable, refill below   - escitalopram (LEXAPRO) 10 MG tablet; Take 1 tablet (10 mg) by mouth daily    Obsessive-compulsive disorder, unspecified type  - stable, refill below   - escitalopram (LEXAPRO) 10 MG tablet; Take 1 tablet (10 mg) by mouth daily    Hepatitis B immune    Skin cancer screening  - Adult Dermatology  Referral; Future    Lipid screening  - Lipid panel reflex to direct LDL Non-fasting; Future  - Lipid panel reflex to direct LDL Non-fasting    Screening for diabetes mellitus  - Hemoglobin A1c; Future  - Hemoglobin A1c    Screening for prostate cancer  - PSA, screen; Future  - PSA, screen              BMI  Estimated body mass index is 25.08 kg/m  as calculated from the following:    Height as of this encounter: 1.85 m (6' 0.84\").    Weight as of this encounter: 85.8 kg (189 lb 3.2 oz).       Counseling  Appropriate preventive services were discussed with this patient, including applicable screening as appropriate for fall prevention, nutrition, physical activity, Tobacco-use cessation, weight loss and cognition.  Checklist reviewing preventive services available has been given to the patient.  Reviewed patient's diet, addressing concerns and/or questions.           Subjective   Jaun is a 49 year old, presenting for the following:  Physical        " 3/13/2024     1:22 PM   Additional Questions   Roomed by SANDY Sandoval   Accompanied by self         3/13/2024     1:22 PM   Patient Reported Additional Medications   Patient reports taking the following new medications none        Health Care Directive  Patient does not have a Health Care Directive or Living Will: Advance Directive received and scanned. Click on Code in the patient header to view.    HPI    He briefly stopped taking lexapro in the fall and restarted in the winter. It helped him with frustrations and it doesn't make him feel loopy.     He takes 2-3 restroril/week.         3/13/2024   General Health   How would you rate your overall physical health? (!) FAIR   Feel stress (tense, anxious, or unable to sleep) Rather much   (!) STRESS CONCERN      3/13/2024   Nutrition   Three or more servings of calcium each day? Yes   Diet: Regular (no restrictions)   How many servings of fruit and vegetables per day? 4 or more   How many sweetened beverages each day? 0-1         3/13/2024   Exercise   Days per week of moderate/strenous exercise 5 days         3/13/2024   Social Factors   Frequency of gathering with friends or relatives Once a week   Worry food won't last until get money to buy more No   Food not last or not have enough money for food? No   Do you have housing?  Yes   Are you worried about losing your housing? No   Lack of transportation? No   Unable to get utilities (heat,electricity)? No          No data to display                   3/13/2024   Dental   Dentist two times every year? Yes         3/13/2024   TB Screening   Were you born outside of [place]?  No         Today's PHQ-2 Score:       3/13/2024     1:14 PM   PHQ-2 ( 1999 [employer])   Q1: Little interest or pleasure in doing things 1   Q2: Feeling down, depressed or hopeless 0   PHQ-2 Score 1   Q1: Little interest or pleasure in doing things Several days   Q2: Feeling down, depressed or hopeless Not at all   PHQ-2 Score 1           3/13/2024  "  Substance Use   Alcohol more than 3/day or more than 7/wk Not Applicable   Do you use any other substances recreationally? No     Social History     Tobacco Use    Smoking status: Former     Passive exposure: Never    Smokeless tobacco: Never   Vaping Use    Vaping Use: Never used   Substance Use Topics    Alcohol use: No     Alcohol/week: 16.7 standard drinks of alcohol     Types: 20 Cans of beer per week     Comment: 40 drinks a week , quit drinking 6 weeks ago     Drug use: No           3/13/2024   STI Screening   New sexual partner(s) since last STI/HIV test? No   ASCVD Risk   The 10-year ASCVD risk score (Donavon TREVIÑO, et al., 2019) is: 3%    Values used to calculate the score:      Age: 49 years      Sex: Male      Is Non- : No      Diabetic: No      Tobacco smoker: No      Systolic Blood Pressure: 132 mmHg      Is BP treated: No      HDL Cholesterol: 55 mg/dL      Total Cholesterol: 186 mg/dL            3/13/2024   Contraception/Family Planning   Questions about contraception or family planning No        Reviewed and updated as needed this visit by Provider                             Objective    Exam  /68 (BP Location: Right arm, Patient Position: Sitting, Cuff Size: Adult Regular)   Pulse 85   Temp 98.7  F (37.1  C) (Temporal)   Resp 20   Ht 1.85 m (6' 0.84\")   Wt 85.8 kg (189 lb 3.2 oz)   SpO2 99%   BMI 25.08 kg/m     Estimated body mass index is 25.08 kg/m  as calculated from the following:    Height as of this encounter: 1.85 m (6' 0.84\").    Weight as of this encounter: 85.8 kg (189 lb 3.2 oz).    Physical Exam          Signed Electronically by: Harleen Ny MD    "

## 2024-03-14 LAB
CHOLEST SERPL-MCNC: 168 MG/DL
CREAT UR-MCNC: 78 MG/DL
FASTING STATUS PATIENT QL REPORTED: NO
HDLC SERPL-MCNC: 52 MG/DL
LDLC SERPL CALC-MCNC: 105 MG/DL
NONHDLC SERPL-MCNC: 116 MG/DL
PSA SERPL DL<=0.01 NG/ML-MCNC: 0.47 NG/ML (ref 0–3.5)
TRIGL SERPL-MCNC: 55 MG/DL

## 2024-03-15 LAB
N-NORTRAMADOL/CREAT UR CFM: ABNORMAL NG/MG {CREAT}
O-NORTRAMADOL UR CFM-MCNC: ABNORMAL NG/ML
OXAZEPAM UR QL CFM: PRESENT
TEMAZEPAM UR QL CFM: PRESENT
TRAMADOL CTO UR CFM-MCNC: ABNORMAL NG/ML
TRAMADOL/CREAT UR: ABNORMAL

## 2024-04-01 ENCOUNTER — MYC MEDICAL ADVICE (OUTPATIENT)
Dept: UROLOGY | Facility: CLINIC | Age: 49
End: 2024-04-01
Payer: COMMERCIAL

## 2024-04-01 DIAGNOSIS — Z30.09 ENCOUNTER FOR VASECTOMY COUNSELING: Primary | ICD-10-CM

## 2024-04-01 RX ORDER — DIAZEPAM 5 MG
10 TABLET ORAL ONCE
Qty: 2 TABLET | Refills: 0 | Status: SHIPPED | OUTPATIENT
Start: 2024-04-01 | End: 2024-04-01

## 2024-04-02 ENCOUNTER — OFFICE VISIT (OUTPATIENT)
Dept: UROLOGY | Facility: CLINIC | Age: 49
End: 2024-04-02
Payer: COMMERCIAL

## 2024-04-02 ENCOUNTER — ALLIED HEALTH/NURSE VISIT (OUTPATIENT)
Dept: NURSING | Facility: CLINIC | Age: 49
End: 2024-04-02
Payer: COMMERCIAL

## 2024-04-02 VITALS — SYSTOLIC BLOOD PRESSURE: 135 MMHG | HEART RATE: 80 BPM | DIASTOLIC BLOOD PRESSURE: 78 MMHG

## 2024-04-02 VITALS — HEART RATE: 80 BPM | DIASTOLIC BLOOD PRESSURE: 78 MMHG | SYSTOLIC BLOOD PRESSURE: 135 MMHG

## 2024-04-02 DIAGNOSIS — Z30.2 ENCOUNTER FOR STERILIZATION: Primary | ICD-10-CM

## 2024-04-02 PROCEDURE — 55250 REMOVAL OF SPERM DUCT(S): CPT | Performed by: UROLOGY

## 2024-04-02 PROCEDURE — 99207 PR NO CHARGE NURSE ONLY: CPT

## 2024-04-02 NOTE — NURSING NOTE
Jaun Lobo's goals for this visit include:   Chief Complaint   Patient presents with    Vasectomy     Voluntary sterilization       He requests these members of his care team be copied on today's visit information:       PCP: Harleen Ny Y    Referring Provider:  No referring provider defined for this encounter.    /78   Pulse 80     Do you need any medication refills at today's visit?     Shazia Hartman LPN on 4/2/2024 at 12:26 PM

## 2024-04-02 NOTE — NURSING NOTE
"Jaun Lobo comes into clinic today at the request of  Ordering Provider for consent prior to valium for the diagnosis of sterilization.    Patient arrived to clinic was consented for vasectomy. Patient took valium in front of staff member. Patient brought back to the lobby until being called back for procedure.       Vital signs:      BP: 135/78 Pulse: 80                Estimated body mass index is 25.08 kg/m  as calculated from the following:    Height as of 3/13/24: 1.85 m (6' 0.84\").    Weight as of 3/13/24: 85.8 kg (189 lb 3.2 oz).        This service provided today was under the supervising provider of the day , who was available if needed.    Shazia Hartman LPN    "

## 2024-04-02 NOTE — PATIENT INSTRUCTIONS
Vasectomy Post-Op Care Instructions     You may go home after the procedure is completed. There may be some pain in your groin for 3 or 4 days after the operation. Some blood or yellow liquid may ooze from the cuts on the outside. The area around the cuts may swell and bruise. The sutures will dissolve on their own and do not require removal by the physician.    The first 48 hours after the procedure are crucial to healing. Generally, you may feel very good the day after the procedure, but that does not mean it is time to go back to normal activities. Resuming normal activities too soon is likely to cause internal bleeding and lots of pain.      Your provider may advise the following ways to care for yourself after the procedure:    Put an ice bag or package of frozen peas covered by a thin towel over the scrotum after the procedure. Leave the ice on the area for 30 minutes and then take it off for 30 minutes. Do this off and on for at least 24 hours.      Avoid all heavy lifting (greater than 10 pounds) for at least one week.    Wear a jockstrap or tight-fitting underwear for approximately one week to support the scrotum (testicles) and help reduce discomfort.    Take a pain reliever, such as Acetaminophen (Tylenol) or Ibuprofen (Advil), for any pain after the procedure. Your provider may prescribe a stronger pain medicine if it is needed.    You should be able to return to work in 48 hours, but strenuous activity should be avoided for two weeks.    Do not submerge the incision for 1 week following the procedure.    Ejaculation should be avoided for one week to allow the area to heal.    Follow-Up    You will need to have a negative post vasectomy analyses (no sperm seen) before you can discontinue birth control.    Semen Analysis   Schedule the post-vasectomy semen analysis three months after your vasectomy. You will need to ejaculate at least 20 times between your surgery and the first sample. Clinic staff will  contact your regarding your results via phone.    You will be given a container after the procedure. Collect the specimen at home by masturbation only (no lubrication, powders, saliva, or intercourse can be used) and bring it to the laboratory. You must have an appointment to drop off your specimen. The specimen needs to be delivered to the lab within 30-45 minutes.    Call [phone] with questions. For concerns or questions after hours or on weekends, please page the Urology Resident on-call: [phone].

## 2024-06-28 ENCOUNTER — LAB (OUTPATIENT)
Dept: LAB | Facility: CLINIC | Age: 49
End: 2024-06-28
Payer: COMMERCIAL

## 2024-06-28 DIAGNOSIS — Z30.2 ENCOUNTER FOR STERILIZATION: ICD-10-CM

## 2024-06-28 LAB — SEMEN ANALYSIS P VAS PNL: NORMAL

## 2024-06-28 PROCEDURE — 89321 SEMEN ANAL SPERM DETECTION: CPT

## 2024-07-05 ENCOUNTER — MYC MEDICAL ADVICE (OUTPATIENT)
Dept: UROLOGY | Facility: CLINIC | Age: 49
End: 2024-07-05
Payer: COMMERCIAL

## 2024-07-05 NOTE — TELEPHONE ENCOUNTER
Keanu Graff MD Cox, Talia, LPN  Cc: P [facility] [place]  That is a lifetime risk. Another way to describe it is that of [id number] men who get a vasectomy, 1 will have potential for paternity at some point in their lifetime.    Thanks,  Keanu Graff M.D.      Received message above from Dr. Graff.    Attempted to call patient to review message. Patient answered but could not hear staff. Staff recalled patient and was sent to voicemail. Voicemail box was full and staff was unable to leave a voicemail.     Shazia Hartman LPN on 7/5/2024 at 9:40 AM

## 2024-07-09 NOTE — TELEPHONE ENCOUNTER
7/9 2nd attempt - left generic voicemail for patient to return call to clinic at [phone], also informed patient we will be sending a [employer] message as well.     That is a lifetime risk. Another way to describe it is that of [id number] men who get a vasectomy, 1 will have potential for paternity at some point in their lifetime.    Thanks,  Keanu Graff M.D.       Unable to get a old of patient - closing encounter for now.    Amanda Hendrix MA on 7/9/2024 at 8:27 AM

## 2024-07-23 ENCOUNTER — ALLIED HEALTH/NURSE VISIT (OUTPATIENT)
Dept: FAMILY MEDICINE | Facility: CLINIC | Age: 49
End: 2024-07-23
Payer: COMMERCIAL

## 2024-07-23 DIAGNOSIS — Z23 NEED FOR SHINGLES VACCINE: Primary | ICD-10-CM

## 2024-07-23 PROCEDURE — 99207 PR NO CHARGE NURSE ONLY: CPT

## 2024-07-23 PROCEDURE — 90750 HZV VACC RECOMBINANT IM: CPT

## 2024-07-23 PROCEDURE — 90471 IMMUNIZATION ADMIN: CPT

## 2024-07-23 NOTE — PROGRESS NOTES
Prior to immunization administration, verified patients identity using patient s name and date of birth. Please see Immunization Activity for additional information.     Screening Questionnaire for Adult Immunization    Are you sick today?   No   Do you have allergies to medications, food, a vaccine component or latex?   Yes   Have you ever had a serious reaction after receiving a vaccination?   No   Do you have a long-term health problem with heart, lung, kidney, or metabolic disease (e.g., diabetes), asthma, a blood disorder, no spleen, complement component deficiency, a cochlear implant, or a spinal fluid leak?  Are you on long-term aspirin therapy?   Yes   Do you have cancer, leukemia, HIV/AIDS, or any other immune system problem?   No   Do you have a parent, brother, or sister with an immune system problem?   No   In the past 3 months, have you taken medications that affect  your immune system, such as prednisone, other steroids, or anticancer drugs; drugs for the treatment of rheumatoid arthritis, Crohn s disease, or psoriasis; or have you had radiation treatments?   No   Have you had a seizure, or a brain or other nervous system problem?   No   During the past year, have you received a transfusion of blood or blood    products, or been given immune (gamma) globulin or antiviral drug?   No   For women: Are you pregnant or is there a chance you could become       pregnant during the next month?   No   Have you received any vaccinations in the past 4 weeks?   No     Immunization questionnaire was positive for at least one answer.  Notified Dr. Castellano.    I have reviewed the following standing orders:   This patient is due and qualifies for the Zoster vaccine.    Click here for Zoster Standing Order    I have reviewed the vaccines inclusion and exclusion criteria; No concerns regarding eligibility.     Patient instructed to remain in clinic for 15 minutes afterwards, and to report any adverse reactions.      Screening performed by Charmaine Roman MA on 7/23/2024 at 11:14 AM.

## 2024-08-06 NOTE — PROGRESS NOTES
Discharge Note    Progress reporting period is from initial evaluation date (please see noted date below) to Sep 17, 2021.  Linked Episodes   Type: Episode: Status: Noted: Resolved: Last update: Updated by:   PHYSICAL THERAPY low back 07/19/2021 Active 7/19/2021 9/17/2021  9:22 AM Mega Zhang, PT      Comments:       Jaun failed to follow up and current status is unknown.  Please see information below for last relevant information on current status.  Patient seen for 6 visits.    SUBJECTIVE  Subjective changes noted by patient:  Patient reports improving pain symptoms, ROM, strength and function. Patient reports improved walking tolerance. Patient reports consistency with HEP.   .  Current pain level is 0/10.     Previous pain level was  10/10.   Changes in function:  Yes (See Goal flowsheet attached for changes in current functional level)  Adverse reaction to treatment or activity: None    OBJECTIVE  Changes noted in objective findings: lumbar AROM: FL = min loss, EXT = WNL; weak bilateral glut med; front plank = 1 minute     ASSESSMENT/PLAN  Diagnosis: Acute B LBP   Updated problem list and treatment plan:     STG/LTGs have been met or progress has been made towards goals:  Yes, please see goal flowsheet for most current information  Assessment of Progress: current status is unknown.    Last current status: Pt is progressing as expected   Self Management Plans:  HEP  I have re-evaluated this patient and find that the nature, scope, duration and intensity of the therapy is appropriate for the medical condition of the patient.  Jaun continues to require the following intervention to meet STG and LTG's:  HEP.    Recommendations:  Discharge with current home program.  Patient to follow up with MD as needed.    Please refer to the daily flowsheet for treatment today, total treatment time and time spent performing 1:1 timed codes.         Emily STEELE Laurynjanell is a 64 year old female presenting for   Chief Complaint   Patient presents with   • Consultation     Denies Latex allergy or sensitivity.    Medication verified and med list updated  Refills needed today: No

## 2024-09-05 NOTE — TELEPHONE ENCOUNTER
He can come in anytime for this, although urine test for gonorrhea/chlamydia needs to be done with either first morning void or at least one hour after prior void.  Sincerely,  Dr. Ioana Villar MD  9/13/2017     How Severe Are Your Bumps?: moderate Have Your Bumps Been Treated?: not been treated Is This A New Presentation, Or A Follow-Up?: Bump

## 2024-12-18 ENCOUNTER — OFFICE VISIT (OUTPATIENT)
Dept: DERMATOLOGY | Facility: CLINIC | Age: 49
End: 2024-12-18
Payer: COMMERCIAL

## 2024-12-18 DIAGNOSIS — D23.9 DERMATOFIBROMA: ICD-10-CM

## 2024-12-18 DIAGNOSIS — L80 VITILIGO: Primary | ICD-10-CM

## 2024-12-18 DIAGNOSIS — L81.4 LENTIGO: ICD-10-CM

## 2024-12-18 DIAGNOSIS — L82.1 SEBORRHEIC KERATOSIS: ICD-10-CM

## 2024-12-18 DIAGNOSIS — L72.0 EPIDERMAL CYST: ICD-10-CM

## 2024-12-18 DIAGNOSIS — D22.9 MULTIPLE NEVI: ICD-10-CM

## 2024-12-18 DIAGNOSIS — Z87.898 HISTORY OF ATYPICAL NEVUS: ICD-10-CM

## 2024-12-18 DIAGNOSIS — Z12.83 SKIN CANCER SCREENING: ICD-10-CM

## 2024-12-18 LAB — TSH SERPL DL<=0.005 MIU/L-ACNC: 1.78 UIU/ML (ref 0.3–4.2)

## 2024-12-18 PROCEDURE — 36415 COLL VENOUS BLD VENIPUNCTURE: CPT | Performed by: STUDENT IN AN ORGANIZED HEALTH CARE EDUCATION/TRAINING PROGRAM

## 2024-12-18 PROCEDURE — 99203 OFFICE O/P NEW LOW 30 MIN: CPT | Performed by: STUDENT IN AN ORGANIZED HEALTH CARE EDUCATION/TRAINING PROGRAM

## 2024-12-18 PROCEDURE — 84443 ASSAY THYROID STIM HORMONE: CPT | Performed by: STUDENT IN AN ORGANIZED HEALTH CARE EDUCATION/TRAINING PROGRAM

## 2024-12-18 NOTE — LETTER
12/18/2024      Jaun Lobo  [address]  [place] [address]      Dear Colleague,    Thank you for referring your patient, Jaun Lobo, to the [facility]. Please see a copy of my visit note below.    [facility] Dermatology Note  Encounter Date: Dec 18, 2024  Office Visit     Reviewed patients past medical history and pertinent chart review prior to patients visit today.     Dermatology Problem List:  Last skin check: 12/18/2024  # History of atypical nevus (unknown severity), right scapular back, s/p excision 10 years ago  # Vitiligo  # Epidermal cysts    Personal Hx: No personal history of skin cancer  Family Hx: Negative for family history of skin cancer.    Social Hx: .  _________________________________________    Assessment & Plan:     # Vitiligo   -Educated patient that vitiligo is an often chronic, relapsing autoimmune disease. Discussed that there is sometimes a correlation between vitiligo and other autoimmune diseases such as thyroid disease.  Patient has not had TSH checked and consents to a lab draw today.  TSH ordered. Patient otherwise feels his vitiligo has remained stable and he is not interested in treatment at this time. He may follow up as needed.    # Epidermal inclusion cyst, right posterior neck and right chest  - Benign, no further treatment needed. Will let us know if wants treatment in the future.     # Personal history of atypical nevus, right scapular back, no evidence of recurrence  # Multiple nevi, trunk and extremities  # Solar lentigines  - Continued observation recommended.   - Nevi demonstrate no concerning features on dermoscopy. We discussed the importance of self exams at home.   - ABCDEs: Counseled ABCDEs of melanoma: Asymmetry, Border (irregularity), Color (not uniform, changes in color), Diameter (greater than 6 mm which is about the size of a pencil eraser), and Evolving (any changes in preexisting  moles).  - Sun protection: Counseled SPF 30+ sunscreen, UPF clothing, sun avoidance, tanning bed avoidance.    # Seborrheic keratoses  # Dermatofibroma, right thigh  - We discussed the benign nature of the skin lesions. No treatment required. Continued observation recommended. Follow up with any concerns.      Follow-up: 1 year(s) for follow up full body skin exam, prn for new or changing lesions or new concerns    All risks, benefits and alternatives were discussed with patient.  Patient is in agreement and understands the assessment and plan.  All questions were answered.  Caron Osorio PA-C  [facility] Dermatology  ____________________________________________    CC: Skin cancer screening exam    HPI:  Mr. Jaun Lobo is a(n) 49 year old male who presents today as a new patient for a full body skin cancer screening. Patient has concerns today about a lesion on the right chest. This lesion has been present for 1 year. It does not itch, bleed, or cause pain. It has not grown in size. He also has a history of an atypical nevus (unknown severity) treated ten years ago on the right scapular back.    Patient is trying to be diligent with photoprotection. Patient is otherwise feeling well, without additional skin concerns. He tans well.     Physical Exam:  Vitals: There were no vitals taken for this visit.  SKIN: Total skin excluding the genitalia areas was performed. The exam included the head/face, neck, both arms, chest, back, abdomen, both legs, digits, mons pubis, buttock and nails.   -Yost II.  -There is a well healed scar involving the right scapular back without evidence of recurrence.  - right chest and right posterior neck, firm, skin colored small nodule with an overlying dilated punctum   -right medial thigh, firm, hyperpigmented papule that dimples upon manipulation    -multiple tan/brown flat round macules and raised papules scattered throughout trunk, extremities, and face consistent with  benign appearing nevi. No worrisome features for malignancy noted on examination.  -scattered tan, homogenous macules scattered on sun exposed areas of trunk, extremities, and face consistent with solar lentigines.   -scattered waxy, stuck on tan/brown papules and patches on the trunk and extremities consistent with seborrheic keratoses.  -right axilla, depigmented patch (patient reports also having this in groin region, but declined evaluation today)    - No other lesions of concern on areas examined.     Medications:  Current Outpatient Medications   Medication Sig Dispense Refill     albuterol (PROAIR HFA/PROVENTIL HFA/VENTOLIN HFA) 108 (90 Base) MCG/ACT inhaler Inhale 2 puffs into the lungs every 4 hours as needed for shortness of breath / dyspnea or wheezing 18 g 1     budesonide-formoterol (SYMBICORT) 160-4.5 MCG/ACT Inhaler Inhale 2 puffs into the lungs 2 times daily 10.2 g 4     escitalopram (LEXAPRO) 10 MG tablet Take 1 tablet (10 mg) by mouth daily 90 tablet 3     fluticasone (FLONASE) 50 MCG/ACT nasal spray INSTILL 2 SPRAYS INTO BOTH NOSTRILS DAILY 16 mL 3     hydrOXYzine (ATARAX) 25 MG tablet Take 1-2 tablets (25-50 mg) by mouth 3 times daily as needed for anxiety 60 tablet 0     melatonin 3 MG tablet Take 3 mg by mouth nightly as needed.       meloxicam (MOBIC) 7.5 MG tablet Take 1 tablet by mouth daily at 2 pm       montelukast (SINGULAIR) 10 MG tablet Take 1 tablet (10 mg) by mouth At Bedtime 90 tablet 3     multivitamin w/minerals (THERA-VIT-M) tablet Take 1 tablet by mouth daily       naloxone (NARCAN) 4 MG/0.1ML nasal spray Spray 1 spray (4 mg) into one nostril alternating nostrils once as needed for opioid reversal every 2-3 minutes until assistance arrives 0.2 mL 0     temazepam (RESTORIL) 15 MG capsule Take 1/2 to 1 tablet PO at bedtime as needed for sleep, 1 year script 72 capsule 0     temazepam (RESTORIL) 7.5 MG capsule Take 1 capsule (7.5 mg) by mouth nightly as needed for sleep One year  script 3//13/2025 100 capsule 0     traMADol (ULTRAM) 50 MG tablet Take 50 mg by mouth every 6 hours as needed for severe pain       No current facility-administered medications for this visit.      Past Medical History:   Patient Active Problem List   Diagnosis     Allergic rhinitis     KEVIN (generalized anxiety disorder)     Headache     Ankylosing spondylitis (H)     DDD (degenerative disc disease), lumbar     Herpes simplex esophagitis     CARDIOVASCULAR SCREENING; LDL GOAL LESS THAN 160     Insomnia     GERD (gastroesophageal reflux disease)     Esophagitis     Alcohol dependence in remission (HCC)     Uncomplicated alcohol dependence (H)     History of esophageal ulcer     Intractable back pain     Mild intermittent asthma     Obsessive-compulsive disorder, unspecified type     Past Medical History:   Diagnosis Date     Alcohol dependence (H)      Allergic rhinitis, cause unspecified     allergy shots as kid     Anxiety state, unspecified     on [facility]     Back disorder     degenerative      DDD (degenerative disc disease)      Esophagitis      Lichen planus 01/01/2007     MEDICAL HISTORY OF - 03/10/2010    ulcerative esophagitis     Mild intermittent asthma     rare use of albuteral     Tobacco use disorder     quit 2002     Unspecified hemorrhoids without mention of complication        CC Referred Self, MD  No address on file on close of this encounter.      Again, thank you for allowing me to participate in the care of your patient.        Sincerely,        Cassandra Osorio PA-C

## 2024-12-18 NOTE — PROGRESS NOTES
[facility] Dermatology Note  Encounter Date: Dec 18, 2024  Office Visit     Reviewed patients past medical history and pertinent chart review prior to patients visit today.     Dermatology Problem List:  Last skin check: 12/18/2024  # History of atypical nevus (unknown severity), right scapular back, s/p excision 10 years ago  # Vitiligo  # Epidermal cysts    Personal Hx: No personal history of skin cancer  Family Hx: Negative for family history of skin cancer.    Social Hx: .  _________________________________________    Assessment & Plan:     # Vitiligo   -Educated patient that vitiligo is an often chronic, relapsing autoimmune disease. Discussed that there is sometimes a correlation between vitiligo and other autoimmune diseases such as thyroid disease.  Patient has not had TSH checked and consents to a lab draw today.  TSH ordered. Patient otherwise feels his vitiligo has remained stable and he is not interested in treatment at this time. He may follow up as needed.    # Epidermal inclusion cyst, right posterior neck and right chest  - Benign, no further treatment needed. Will let us know if wants treatment in the future.     # Personal history of atypical nevus, right scapular back, no evidence of recurrence  # Multiple nevi, trunk and extremities  # Solar lentigines  - Continued observation recommended.   - Nevi demonstrate no concerning features on dermoscopy. We discussed the importance of self exams at home.   - ABCDEs: Counseled ABCDEs of melanoma: Asymmetry, Border (irregularity), Color (not uniform, changes in color), Diameter (greater than 6 mm which is about the size of a pencil eraser), and Evolving (any changes in preexisting moles).  - Sun protection: Counseled SPF 30+ sunscreen, UPF clothing, sun avoidance, tanning bed avoidance.    # Seborrheic keratoses  # Dermatofibroma, right thigh  - We discussed the benign nature of the skin lesions. No treatment required. Continued  observation recommended. Follow up with any concerns.      Follow-up: 1 year(s) for follow up full body skin exam, prn for new or changing lesions or new concerns    All risks, benefits and alternatives were discussed with patient.  Patient is in agreement and understands the assessment and plan.  All questions were answered.  Caron Osorio PA-C  [facility] Dermatology  ____________________________________________    CC: Skin cancer screening exam    HPI:  Mr. Jaun Lobo is a(n) 49 year old male who presents today as a new patient for a full body skin cancer screening. Patient has concerns today about a lesion on the right chest. This lesion has been present for 1 year. It does not itch, bleed, or cause pain. It has not grown in size. He also has a history of an atypical nevus (unknown severity) treated ten years ago on the right scapular back.    Patient is trying to be diligent with photoprotection. Patient is otherwise feeling well, without additional skin concerns. He tans well.     Physical Exam:  Vitals: There were no vitals taken for this visit.  SKIN: Total skin excluding the genitalia areas was performed. The exam included the head/face, neck, both arms, chest, back, abdomen, both legs, digits, mons pubis, buttock and nails.   -Yost II.  -There is a well healed scar involving the right scapular back without evidence of recurrence.  - right chest and right posterior neck, firm, skin colored small nodule with an overlying dilated punctum   -right medial thigh, firm, hyperpigmented papule that dimples upon manipulation    -multiple tan/brown flat round macules and raised papules scattered throughout trunk, extremities, and face consistent with benign appearing nevi. No worrisome features for malignancy noted on examination.  -scattered tan, homogenous macules scattered on sun exposed areas of trunk, extremities, and face consistent with solar lentigines.   -scattered waxy, stuck on tan/brown  papules and patches on the trunk and extremities consistent with seborrheic keratoses.  -right axilla, depigmented patch (patient reports also having this in groin region, but declined evaluation today)    - No other lesions of concern on areas examined.     Medications:  Current Outpatient Medications   Medication Sig Dispense Refill    albuterol (PROAIR HFA/PROVENTIL HFA/VENTOLIN HFA) 108 (90 Base) MCG/ACT inhaler Inhale 2 puffs into the lungs every 4 hours as needed for shortness of breath / dyspnea or wheezing 18 g 1    budesonide-formoterol (SYMBICORT) 160-4.5 MCG/ACT Inhaler Inhale 2 puffs into the lungs 2 times daily 10.2 g 4    escitalopram (LEXAPRO) 10 MG tablet Take 1 tablet (10 mg) by mouth daily 90 tablet 3    fluticasone (FLONASE) 50 MCG/ACT nasal spray INSTILL 2 SPRAYS INTO BOTH NOSTRILS DAILY 16 mL 3    hydrOXYzine (ATARAX) 25 MG tablet Take 1-2 tablets (25-50 mg) by mouth 3 times daily as needed for anxiety 60 tablet 0    melatonin 3 MG tablet Take 3 mg by mouth nightly as needed.      meloxicam (MOBIC) 7.5 MG tablet Take 1 tablet by mouth daily at 2 pm      montelukast (SINGULAIR) 10 MG tablet Take 1 tablet (10 mg) by mouth At Bedtime 90 tablet 3    multivitamin w/minerals (THERA-VIT-M) tablet Take 1 tablet by mouth daily      naloxone (NARCAN) 4 MG/0.1ML nasal spray Spray 1 spray (4 mg) into one nostril alternating nostrils once as needed for opioid reversal every 2-3 minutes until assistance arrives 0.2 mL 0    temazepam (RESTORIL) 15 MG capsule Take 1/2 to 1 tablet PO at bedtime as needed for sleep, 1 year script 72 capsule 0    temazepam (RESTORIL) 7.5 MG capsule Take 1 capsule (7.5 mg) by mouth nightly as needed for sleep One year script 3//13/2025 100 capsule 0    traMADol (ULTRAM) 50 MG tablet Take 50 mg by mouth every 6 hours as needed for severe pain       No current facility-administered medications for this visit.      Past Medical History:   Patient Active Problem List   Diagnosis     Allergic rhinitis    KEVIN (generalized anxiety disorder)    Headache    Ankylosing spondylitis (H)    DDD (degenerative disc disease), lumbar    Herpes simplex esophagitis    CARDIOVASCULAR SCREENING; LDL GOAL LESS THAN 160    Insomnia    GERD (gastroesophageal reflux disease)    Esophagitis    Alcohol dependence in remission (HCC)    Uncomplicated alcohol dependence (H)    History of esophageal ulcer    Intractable back pain    Mild intermittent asthma    Obsessive-compulsive disorder, unspecified type     Past Medical History:   Diagnosis Date    Alcohol dependence (H)     Allergic rhinitis, cause unspecified     allergy shots as kid    Anxiety state, unspecified     on [facility]    Back disorder     degenerative     DDD (degenerative disc disease)     Esophagitis     Lichen planus 01/01/2007    MEDICAL HISTORY OF - 03/10/2010    ulcerative esophagitis    Mild intermittent asthma     rare use of albuteral    Tobacco use disorder     quit 2002    Unspecified hemorrhoids without mention of complication        CC Referred Self, MD  No address on file on close of this encounter.

## 2024-12-18 NOTE — PATIENT INSTRUCTIONS
Patient Education       Proper skin care from [facility]:    -Eliminate harsh soaps as they strip the natural oils from the skin, often resulting in dry itchy skin ( i.e. Dial, Zest, Bengali Spring)  -Use mild soaps such as Cetaphil or Dove Sensitive Skin in the shower. You do not need to use soap on arms, legs, and trunk every time you shower unless visibly soiled.   -Avoid hot or cold showers.  -After showering, lightly dry off and apply moisturizing within 2-3 minutes. This will help trap moisture in the skin.   -Aggressive use of a moisturizer at least 1-2 times a day to the entire body (including -Vanicream, Cetaphil, Aquaphor or Cerave) and moisturize hands after every washing.  -We recommend using moisturizers that come in a tub that needs to be scooped out, not a pump. This has more of an oil base. It will hold moisture in your skin much better than a water base moisturizer. The above recommended are non-pore clogging.      Wear a sunscreen with at least SPF 30 on your face, ears, neck and V of the chest daily. Wear sunscreen on other areas of the body if those areas are exposed to the sun throughout the day. Sunscreens can contain physical and/or chemical blockers. Physical blockers are less likely to clog pores, these include zinc oxide and titanium dioxide. Reapply every two hour and after swimming.     Sunscreen examples: https://www.ewg.org/sunscreen/    UV radiation  UVA radiation remains constant throughout the day and throughout the year. It is a longer wavelength than UVB and therefore penetrates deeper into the skin leading to immediate and delayed tanning, photoaging, and skin cancer. 70-80% of UVA and UVB radiation occurs between the hours of 10am-2pm.  UVB radiation  UVB radiation causes the most harmful effects and is more significant during the summer months. However, snow and ice can reflect UVB radiation leading to skin damage during the winter months as well. UVB radiation is  responsible for tanning, burning, inflammation, delayed erythema (pinkness), pigmentation (brown spots), and skin cancer.     I recommend self monthly full body exams and yearly full body exams with a dermatology provider. If you develop a new or changing lesion please follow up for examination. Most skin cancers are pink and scaly or pink and pearly. However, we do see blue/brown/black skin cancers.  Consider the ABCDEs of melanoma when giving yourself your monthly full body exam ( don't forget the groin, buttocks, feet, toes, etc). A-asymmetry, B-borders, C-color, D-diameter, E-elevation or evolving. If you see any of these changes please follow up in clinic. If you cannot see your back I recommend purchasing a hand held mirror to use with a larger wall mirror.       Checking for Skin Cancer  You can find cancer early by checking your skin each month. There are 3 kinds of skin cancer. They are melanoma, basal cell carcinoma, and squamous cell carcinoma. Doing monthly skin checks is the best way to find new marks or skin changes. Follow the instructions below for checking your skin.   The ABCDEs of checking moles for melanoma   Check your moles or growths for signs of melanoma using ABCDE:   Asymmetry: the sides of the mole or growth don t match  Border: the edges are ragged, notched, or blurred  Color: the color within the mole or growth varies  Diameter: the mole or growth is larger than 6 mm (size of a pencil eraser)  Evolving: the size, shape, or color of the mole or growth is changing (evolving is not shown in the images below)    Checking for other types of skin cancer  Basal cell carcinoma or squamous cell carcinoma have symptoms such as:     A spot or mole that looks different from all other marks on your skin  Changes in how an area feels, such as itching, tenderness, or pain  Changes in the skin's surface, such as oozing, bleeding, or scaliness  A sore that does not heal  New swelling or redness beyond  the border of a mole    Who s at risk?  Anyone can get skin cancer. But you are at greater risk if you have:   Fair skin, light-colored hair, or light-colored eyes  Many moles or abnormal moles on your skin  A history of sunburns from sunlight or tanning beds  A family history of skin cancer  A history of exposure to radiation or chemicals  A weakened immune system  If you have had skin cancer in the past, you are at risk for recurring skin cancer.   How to check your skin  Do your monthly skin checkups in front of a full-length mirror. Check all parts of your body, including your:   Head (ears, face, neck, and scalp)  Torso (front, back, and sides)  Arms (tops, undersides, upper, and lower armpits)  Hands (palms, backs, and fingers, including under the nails)  Buttocks and genitals  Legs (front, back, and sides)  Feet (tops, soles, toes, including under the nails, and between toes)  If you have a lot of moles, take digital photos of them each month. Make sure to take photos both up close and from a distance. These can help you see if any moles change over time.   Most skin changes are not cancer. But if you see any changes in your skin, call your doctor right away. Only he or she can diagnose a problem. If you have skin cancer, seeing your doctor can be the first step toward getting the treatment that could save your life.   [employer] last reviewed this educational content on 4/1/2019 2000-2020 [employer]. [address] [place] [address], [place] [address]. All rights reserved. This information is not intended as a substitute for professional medical care. Always follow your healthcare professional's instructions.       When should I call my doctor?  If you are worsening or not improving, please, contact us or seek urgent care as noted below.     Who should I call with questions (adults)?  [facility] (adult and pediatric): [phone]  [facility]  [place] (adult): [phone]  [facility] ([place], [place], [place] and [place]) [phone]  For urgent needs outside of business hours call the [facility] [[email or web address]] at [phone] and ask for the dermatology resident on call to be paged  If this is a medical emergency and you are unable to reach an ER, Call 911      If you need a prescription refill, please contact your pharmacy. Refills are approved or denied by our Physicians during normal business hours, Monday through Fridays  Per office policy, refills will not be granted if you have not been seen within the past year (or sooner depending on your child's condition

## 2025-02-16 ENCOUNTER — APPOINTMENT (OUTPATIENT)
Dept: GENERAL RADIOLOGY | Facility: CLINIC | Age: 50
End: 2025-02-16
Attending: EMERGENCY MEDICINE
Payer: COMMERCIAL

## 2025-02-16 ENCOUNTER — HOSPITAL ENCOUNTER (EMERGENCY)
Facility: CLINIC | Age: 50
Discharge: HOME | End: 2025-02-16
Attending: EMERGENCY MEDICINE | Admitting: EMERGENCY MEDICINE
Payer: COMMERCIAL

## 2025-02-16 VITALS
DIASTOLIC BLOOD PRESSURE: 81 MMHG | HEIGHT: 73 IN | HEART RATE: 63 BPM | TEMPERATURE: 97.5 F | WEIGHT: 185 LBS | OXYGEN SATURATION: 100 % | RESPIRATION RATE: 18 BRPM | BODY MASS INDEX: 24.52 KG/M2 | SYSTOLIC BLOOD PRESSURE: 134 MMHG

## 2025-02-16 DIAGNOSIS — M25.511 ACUTE PAIN OF RIGHT SHOULDER: ICD-10-CM

## 2025-02-16 DIAGNOSIS — R55 SYNCOPE AND COLLAPSE: ICD-10-CM
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
## 2025-02-16 LAB
ALBUMIN SERPL BCG-MCNC: 4.1 G/DL (ref 3.5–5.2)
ALP SERPL-CCNC: 60 U/L (ref 40–150)
ALT SERPL W P-5'-P-CCNC: 24 U/L (ref 0–70)
ANION GAP SERPL CALCULATED.3IONS-SCNC: 10 MMOL/L (ref 7–15)
AST SERPL W P-5'-P-CCNC: 26 U/L (ref 0–45)
ATRIAL RATE - MUSE: 76 BPM
BASOPHILS # BLD AUTO: 0.1 10E3/UL (ref 0–0.2)
BASOPHILS NFR BLD AUTO: 1 %
BILIRUB SERPL-MCNC: 1 MG/DL
BUN SERPL-MCNC: 18.7 MG/DL (ref 6–20)
CALCIUM SERPL-MCNC: 8.9 MG/DL (ref 8.8–10.4)
CHLORIDE SERPL-SCNC: 103 MMOL/L (ref 98–107)
CREAT SERPL-MCNC: 0.91 MG/DL (ref 0.67–1.17)
DIASTOLIC BLOOD PRESSURE - MUSE: NORMAL MMHG
EGFRCR SERPLBLD CKD-EPI 2021: >90 ML/MIN/1.73M2
EOSINOPHIL # BLD AUTO: 0.2 10E3/UL (ref 0–0.7)
EOSINOPHIL NFR BLD AUTO: 2 %
ERYTHROCYTE [DISTWIDTH] IN BLOOD BY AUTOMATED COUNT: 12.6 % (ref 10–15)
GLUCOSE SERPL-MCNC: 84 MG/DL (ref 70–99)
HCO3 SERPL-SCNC: 26 MMOL/L (ref 22–29)
HCT VFR BLD AUTO: 44.2 % (ref 40–53)
HGB BLD-MCNC: 14 G/DL (ref 13.3–17.7)
IMM GRANULOCYTES # BLD: 0.1 10E3/UL
IMM GRANULOCYTES NFR BLD: 1 %
INTERPRETATION ECG - MUSE: NORMAL
LYMPHOCYTES # BLD AUTO: 2.2 10E3/UL (ref 0.8–5.3)
LYMPHOCYTES NFR BLD AUTO: 28 %
MCH RBC QN AUTO: 29.1 PG (ref 26.5–33)
MCHC RBC AUTO-ENTMCNC: 31.7 G/DL (ref 31.5–36.5)
MCV RBC AUTO: 92 FL (ref 78–100)
MONOCYTES # BLD AUTO: 0.6 10E3/UL (ref 0–1.3)
MONOCYTES NFR BLD AUTO: 8 %
NEUTROPHILS # BLD AUTO: 5 10E3/UL (ref 1.6–8.3)
NEUTROPHILS NFR BLD AUTO: 61 %
NRBC # BLD AUTO: 0 10E3/UL
NRBC BLD AUTO-RTO: 0 /100
P AXIS - MUSE: 51 DEGREES
PLATELET # BLD AUTO: 252 10E3/UL (ref 150–450)
POTASSIUM SERPL-SCNC: 3.9 MMOL/L (ref 3.4–5.3)
PR INTERVAL - MUSE: 150 MS
PROT SERPL-MCNC: 6.6 G/DL (ref 6.4–8.3)
QRS DURATION - MUSE: 90 MS
QT - MUSE: 380 MS
QTC - MUSE: 427 MS
R AXIS - MUSE: 28 DEGREES
RBC # BLD AUTO: 4.81 10E6/UL (ref 4.4–5.9)
SODIUM SERPL-SCNC: 139 MMOL/L (ref 135–145)
SYSTOLIC BLOOD PRESSURE - MUSE: NORMAL MMHG
T AXIS - MUSE: 54 DEGREES
TROPONIN T SERPL HS-MCNC: 6 NG/L
VENTRICULAR RATE- MUSE: 76 BPM
WBC # BLD AUTO: 8.1 10E3/UL (ref 4–11)

## 2025-02-16 PROCEDURE — 73030 X-RAY EXAM OF SHOULDER: CPT | Mod: RT

## 2025-02-16 PROCEDURE — 82435 ASSAY OF BLOOD CHLORIDE: CPT | Performed by: EMERGENCY MEDICINE

## 2025-02-16 PROCEDURE — 71046 X-RAY EXAM CHEST 2 VIEWS: CPT

## 2025-02-16 PROCEDURE — 99285 EMERGENCY DEPT VISIT HI MDM: CPT | Performed by: EMERGENCY MEDICINE

## 2025-02-16 PROCEDURE — 99285 EMERGENCY DEPT VISIT HI MDM: CPT | Mod: 25 | Performed by: EMERGENCY MEDICINE

## 2025-02-16 PROCEDURE — 36415 COLL VENOUS BLD VENIPUNCTURE: CPT | Performed by: EMERGENCY MEDICINE

## 2025-02-16 PROCEDURE — 85025 COMPLETE CBC W/AUTO DIFF WBC: CPT | Performed by: EMERGENCY MEDICINE

## 2025-02-16 PROCEDURE — 71046 X-RAY EXAM CHEST 2 VIEWS: CPT | Mod: 26 | Performed by: RADIOLOGY

## 2025-02-16 PROCEDURE — 73030 X-RAY EXAM OF SHOULDER: CPT | Mod: 26 | Performed by: RADIOLOGY

## 2025-02-16 PROCEDURE — 84484 ASSAY OF TROPONIN QUANT: CPT | Performed by: EMERGENCY MEDICINE

## 2025-02-16 PROCEDURE — 93005 ELECTROCARDIOGRAM TRACING: CPT | Performed by: EMERGENCY MEDICINE

## 2025-02-16 PROCEDURE — 250N000011 HC RX IP 250 OP 636: Mod: JZ | Performed by: EMERGENCY MEDICINE

## 2025-02-16 PROCEDURE — 96374 THER/PROPH/DIAG INJ IV PUSH: CPT | Performed by: EMERGENCY MEDICINE

## 2025-02-16 PROCEDURE — 93010 ELECTROCARDIOGRAM REPORT: CPT | Performed by: EMERGENCY MEDICINE

## 2025-02-16 RX ORDER — HYDROMORPHONE HYDROCHLORIDE 1 MG/ML
0.5 INJECTION, SOLUTION INTRAMUSCULAR; INTRAVENOUS; SUBCUTANEOUS ONCE
Status: COMPLETED | OUTPATIENT
Start: 2025-02-16 | End: 2025-02-16

## 2025-02-16 RX ADMIN — HYDROMORPHONE HYDROCHLORIDE 0.5 MG: 1 INJECTION, SOLUTION INTRAMUSCULAR; INTRAVENOUS; SUBCUTANEOUS at 17:38

## 2025-02-16 ASSESSMENT — COLUMBIA-SUICIDE SEVERITY RATING SCALE - C-SSRS
6. HAVE YOU EVER DONE ANYTHING, STARTED TO DO ANYTHING, OR PREPARED TO DO ANYTHING TO END YOUR LIFE?: NO
2. HAVE YOU ACTUALLY HAD ANY THOUGHTS OF KILLING YOURSELF IN THE PAST MONTH?: NO
1. IN THE PAST MONTH, HAVE YOU WISHED YOU WERE DEAD OR WISHED YOU COULD GO TO SLEEP AND NOT WAKE UP?: NO

## 2025-02-16 ASSESSMENT — ACTIVITIES OF DAILY LIVING (ADL)
ADLS_ACUITY_SCORE: 49

## 2025-02-16 NOTE — ED TRIAGE NOTES
"Triage Assessment & Note:    BP (!) 177/90   Pulse 88   Temp 98.1  F (36.7  C) (Oral)   Resp 16   Ht 1.854 m (6' 1\")   Wt 83.9 kg (185 lb)   SpO2 100%   BMI 24.41 kg/m      Patient presents with: C/o right shoulder pain post fall on the ice x 2 last evening. Pt reports he slipped on the ice and fell on his shoulder the first time. The second time he fell he repots he was attempting to walk back to his shore and passed out landing on his 3 year old child. PT report he had been in the sauna [place] [facility] [place] [facility] [place] [facility] to passing out and falling on his child. ROM limited by pain to right arm. CMS intact.     Home Treatments/Remedies: Sling and swath right arm    Febrile / Afebrile? Afebrile     Duration of C/o:  1 day    Naveen Chavez RN  February 16, 2025       Triage Assessment (Adult)       Row Name 02/16/25 1629          Triage Assessment    Airway WDL WDL        Respiratory WDL    Respiratory WDL WDL        Cardiac WDL    Cardiac WDL WDL                     "

## 2025-02-16 NOTE — ED PROVIDER NOTES
[place] EMERGENCY DEPARTMENT ([facility])    2/16/25       ED PROVIDER NOTE   Vertical Triage A    History     Chief Complaint   Patient presents with    Fall     HPI  Jaun Lobo is a 50-year-old male with a history of  ankylosing spondylitis, degenerative degenerative disc disease, chronic back pain who presents to the emergency department today for right shoulder pain.  Patient reports that [place] the [place].  He had [place] frozen [place] and had gotten out, and then took his wife and his daughter to the hole in the lake where he jumped in.  After he showed them this, he inadvertently slipped on the ice, falling on his right forearm. He states that he heard a pop in the shoulder and had immediate pain.  It took him quite a while to be able to get up.  Patient states that on the second time he had true syncopal episode where he was walking, and had loss of consciousness, falling on his 3-year-old daughter.  He does not know how he landed.  He denies any prodromal shortness of breath, chest pain, or palpitations.  He is not certain if he hit his head.  He states that he went into the [facility] side room where there were multiple people who watched him for a few hours including medics, physicians, and nurses.  Ultimately, due to the late hour, they elected to not present for medical attention until today when he drove home, just got back from the drive from  up [place] and came right to the hospital.    Patient is right-hand dominant, continues to report significant right shoulder pain.  He has taken Tylenol, tramadol, and meloxicam.  He denies any headache, denies any visual changes, denies any midline neck pain.  No shortness of breath or chest pain but he is reporting some left rib pain.  Denies any abdominal pain.  No vomiting but has had some nausea, no diarrhea.  No back pain, no leg pain.      This part of the medical record was transcribed  by LOKESH MILNER Medical Scribe, from a dictation done by Nguyen Leigh MD.      --------------------------------------------------------------------------------------------------------------    Past Medical History  Past Medical History:   Diagnosis Date    Alcohol dependence (H)     Allergic rhinitis, cause unspecified     allergy shots as kid    Anxiety state, unspecified     on [employer]    Back disorder     degenerative     DDD (degenerative disc disease)     Esophagitis     Lichen planus 01/01/2007    MEDICAL HISTORY OF - 03/10/2010    ulcerative esophagitis    Mild intermittent asthma     rare use of albuteral    Tobacco use disorder     quit 2002    Unspecified hemorrhoids without mention of complication      Past Surgical History:   Procedure Laterality Date    CL AFF SURGICAL PATHOLOGY      ESOPHAGOSCOPY, GASTROSCOPY, DUODENOSCOPY (EGD), COMBINED  5/9/2014    Procedure: COMBINED ESOPHAGOSCOPY, GASTROSCOPY, DUODENOSCOPY (EGD), BIOPSY SINGLE OR MULTIPLE;  Surgeon: Yanely Macias MD;  Location: [facility]    wisdom teeth      [facility] NONSPECIFIC PROCEDURE      nasal fx; left clavic fx    [facility] NONSPECIFIC PROCEDURE  12/04    normal colonoscopy; rpt age 49     albuterol (PROAIR HFA/PROVENTIL HFA/VENTOLIN HFA) 108 (90 Base) MCG/ACT inhaler  budesonide-formoterol (SYMBICORT) 160-4.5 MCG/ACT Inhaler  escitalopram (LEXAPRO) 10 MG tablet  fluticasone (FLONASE) 50 MCG/ACT nasal spray  hydrOXYzine (ATARAX) 25 MG tablet  melatonin 3 MG tablet  meloxicam (MOBIC) 7.5 MG tablet  montelukast (SINGULAIR) 10 MG tablet  multivitamin w/minerals (THERA-VIT-M) tablet  naloxone (NARCAN) 4 MG/0.1ML nasal spray  temazepam (RESTORIL) 15 MG capsule  temazepam (RESTORIL) 7.5 MG capsule  traMADol (ULTRAM) 50 MG tablet      Allergies   Allergen Reactions    Antabuse [Disulfiram] Other (See Comments)     hepatitis    Campral [Acamprosate] Other (See Comments)     Mood changes    Animal Dander     Dust Mite Extract     No  "Known Drug Allergy     Ragweeds      goldenrod    Seasonal Allergies      Family History  Family History   Problem Relation Age of Onset    Cerebrovascular Disease Maternal Grandfather         had strokes in his 80's    Gastrointestinal Disease Mother         ulcerative colitis    Anxiety Disorder Mother     Glaucoma Paternal Grandmother     Macular Degeneration Paternal Grandmother     Cancer Other         esophageal cancer, cousin    C.A.D. No family hx of     Diabetes No family hx of     Hypertension No family hx of     Breast Cancer No family hx of     Cancer - colorectal No family hx of     Prostate Cancer No family hx of     Unknown/Adopted No family hx of     Depression No family hx of     Schizophrenia No family hx of     Bipolar Disorder No family hx of     Suicide No family hx of     Substance Abuse No family hx of     Dementia No family hx of     Franklin Disease No family hx of     Parkinsonism No family hx of     Autism Spectrum Disorder No family hx of     Intellectual Disability No family hx of     Mental Illness No family hx of      Social History   Social History     Tobacco Use    Smoking status: Former     Passive exposure: Never    Smokeless tobacco: Never   Vaping Use    Vaping status: Never Used   Substance Use Topics    Alcohol use: No     Alcohol/week: 16.7 standard drinks of alcohol     Types: 20 Cans of beer per week     Comment: 40 drinks a week , quit drinking 6 weeks ago     Drug use: No      Past medical history, past surgical history, medications, allergies, family history, and social history were reviewed with the patient. No additional pertinent items.   A complete review of systems was performed with pertinent positives and negatives noted in the HPI, and all other systems negative.    Physical Exam   BP: (!) 177/90  Pulse: 88  Temp: 98.1  F (36.7  C)  Resp: 16  Height: 185.4 cm (6' 1\")  Weight: 83.9 kg (185 lb)  SpO2: 100 %  Physical Exam  Vitals and nursing note reviewed. "   Constitutional:       General: He is not in acute distress.     Appearance: He is not diaphoretic.      Comments: Adult male, alert, cooperative, appears uncomfortable. Homemade sling/swathe in place for RUE   HENT:      Head: Atraumatic.      Mouth/Throat:      Mouth: Mucous membranes are moist.      Pharynx: Oropharynx is clear. No oropharyngeal exudate.   Eyes:      Pupils: Pupils are equal, round, and reactive to light.   Neck:      Comments: No midline TTP  Cardiovascular:      Rate and Rhythm: Normal rate and regular rhythm.      Pulses: Normal pulses.      Heart sounds: Normal heart sounds. No murmur heard.  Pulmonary:      Effort: Pulmonary effort is normal. No respiratory distress.      Breath sounds: Normal breath sounds. No wheezing.   Chest:      Chest wall: No tenderness.   Abdominal:      General: Abdomen is flat. Bowel sounds are normal.      Palpations: Abdomen is soft.      Tenderness: There is no abdominal tenderness. There is no guarding or rebound.   Musculoskeletal:         General: Swelling and tenderness present.      Cervical back: No tenderness.      Thoracic back: No tenderness.      Lumbar back: No tenderness.      Comments: RUE: sling/swathe in place.  Tender to palpation with some swelling noted along anterior, lateral and superior aspect of shoulder.  He has somewhat limited range of motion of the right shoulder.  He is able to pronate and supinate without difficulty, no tenderness to palpation of the humerus, elbow, forearm, wrist, or hand.  Wrist flexion and extension is intact, finger spread is intact.  Sensation is intact.  Radial pulses intact.  Brisk cap refill normal.   Skin:     Findings: No abrasion or laceration.   Neurological:      General: No focal deficit present.      Mental Status: He is alert and oriented to person, place, and time.      GCS: GCS eye subscore is 4. GCS verbal subscore is 5. GCS motor subscore is 6.      Cranial Nerves: Cranial nerves 2-12 are intact.       Sensory: Sensation is intact.      Motor: Motor function is intact.      Coordination: Coordination is intact.         ED Course, Procedures, & Data      Procedures            EKG Interpretation:      Interpreted by Nguyen Leigh MD  Time reviewed:1715   Symptoms at time of EKG: none   Rhythm: normal sinus   Rate: normal  Axis: NORMAL  Ectopy: none  Conduction: normal  ST Segments/ T Waves: No ST-T wave changes  Q Waves: none  Comparison to prior: NSR has replaced sinus marissa    Clinical Impression: normal EKG              Results for orders placed or performed during the hospital encounter of 02/16/25   Chest XR,  PA & LAT     Status: None    Narrative    EXAM: XR CHEST 2 VIEWS  LOCATION: [facility]  DATE: 2/16/2025    INDICATION: fall, pain, eval for injury  COMPARISON: None.      Impression    IMPRESSION: Negative chest.   XR Shoulder Right G/E 3 Views     Status: None    Narrative    EXAM: XR SHOULDER RIGHT G/E 3 VIEWS  LOCATION: [facility]  DATE: 2/16/2025    INDICATION: fall, pain, eval for fx or dislocation  COMPARISON: None.      Impression    IMPRESSION: The right glenohumeral and acromioclavicular joints are negative for fracture or dislocation.   Comprehensive metabolic panel     Status: Normal   Result Value Ref Range    Sodium 139 135 - 145 mmol/L    Potassium 3.9 3.4 - 5.3 mmol/L    Carbon Dioxide (CO2) 26 22 - 29 mmol/L    Anion Gap 10 7 - 15 mmol/L    Urea Nitrogen 18.7 6.0 - 20.0 mg/dL    Creatinine 0.91 0.67 - 1.17 mg/dL    GFR Estimate >90 >60 mL/min/1.73m2    Calcium 8.9 8.8 - 10.4 mg/dL    Chloride 103 98 - 107 mmol/L    Glucose 84 70 - 99 mg/dL    Alkaline Phosphatase 60 40 - 150 U/L    AST 26 0 - 45 U/L    ALT 24 0 - 70 U/L    Protein Total 6.6 6.4 - 8.3 g/dL    Albumin 4.1 3.5 - 5.2 g/dL    Bilirubin Total 1.0 <=1.2 mg/dL   Troponin T, High Sensitivity     Status: Normal   Result Value Ref  Range    Troponin T, High Sensitivity 6 <=22 ng/L   CBC with platelets and differential     Status: None   Result Value Ref Range    WBC Count 8.1 4.0 - 11.0 10e3/uL    RBC Count 4.81 4.40 - 5.90 10e6/uL    Hemoglobin 14.0 13.3 - 17.7 g/dL    Hematocrit 44.2 40.0 - 53.0 %    MCV 92 78 - 100 fL    MCH 29.1 26.5 - 33.0 pg    MCHC 31.7 31.5 - 36.5 g/dL    RDW 12.6 10.0 - 15.0 %    Platelet Count 252 150 - 450 10e3/uL    % Neutrophils 61 %    % Lymphocytes 28 %    % Monocytes 8 %    % Eosinophils 2 %    % Basophils 1 %    % Immature Granulocytes 1 %    NRBCs per 100 WBC 0 <1 /100    Absolute Neutrophils 5.0 1.6 - 8.3 10e3/uL    Absolute Lymphocytes 2.2 0.8 - 5.3 10e3/uL    Absolute Monocytes 0.6 0.0 - 1.3 10e3/uL    Absolute Eosinophils 0.2 0.0 - 0.7 10e3/uL    Absolute Basophils 0.1 0.0 - 0.2 10e3/uL    Absolute Immature Granulocytes 0.1 <=0.4 10e3/uL    Absolute NRBCs 0.0 10e3/uL   EKG 12 lead     Status: None   Result Value Ref Range    Systolic Blood Pressure  mmHg    Diastolic Blood Pressure  mmHg    Ventricular Rate 76 BPM    Atrial Rate 76 BPM    MI Interval 150 ms    QRS Duration 90 ms     ms    QTc 427 ms    P Axis 51 degrees    R AXIS 28 degrees    T Axis 54 degrees    Interpretation ECG       Sinus rhythm  Normal ECG  Unconfirmed report - interpretation of this ECG is computer generated - see medical record for final interpretation    Confirmed by - EMERGENCY ROOM, PHYSICIAN ([id number]),  BROCK SMITH ([id number]) on 2/16/2025 6:56:40 PM     CBC with Platelets & Differential     Status: None    Narrative    The following orders were created for panel order CBC with Platelets & Differential.  Procedure                               Abnormality         Status                     ---------                               -----------         ------                     CBC with platelets and d...[[id number]]                      Final result                 Please view results for these tests on the individual  orders.     Medications   HYDROmorphone (PF) (DILAUDID) injection 0.5 mg (0.5 mg Intravenous $Given 2/16/25 8703)     Labs Ordered and Resulted from Time of ED Arrival to Time of ED Departure   COMPREHENSIVE METABOLIC PANEL - Normal       Result Value    Sodium 139      Potassium 3.9      Carbon Dioxide (CO2) 26      Anion Gap 10      Urea Nitrogen 18.7      Creatinine 0.91      GFR Estimate >90      Calcium 8.9      Chloride 103      Glucose 84      Alkaline Phosphatase 60      AST 26      ALT 24      Protein Total 6.6      Albumin 4.1      Bilirubin Total 1.0     TROPONIN T, HIGH SENSITIVITY - Normal    Troponin T, High Sensitivity 6     CBC WITH PLATELETS AND DIFFERENTIAL    WBC Count 8.1      RBC Count 4.81      Hemoglobin 14.0      Hematocrit 44.2      MCV 92      MCH 29.1      MCHC 31.7      RDW 12.6      Platelet Count 252      % Neutrophils 61      % Lymphocytes 28      % Monocytes 8      % Eosinophils 2      % Basophils 1      % Immature Granulocytes 1      NRBCs per 100 WBC 0      Absolute Neutrophils 5.0      Absolute Lymphocytes 2.2      Absolute Monocytes 0.6      Absolute Eosinophils 0.2      Absolute Basophils 0.1      Absolute Immature Granulocytes 0.1      Absolute NRBCs 0.0       XR Shoulder Right G/E 3 Views   Final Result   IMPRESSION: The right glenohumeral and acromioclavicular joints are negative for fracture or dislocation.      Chest XR,  PA & LAT   Final Result   IMPRESSION: Negative chest.             Critical care was not performed.     Medical Decision Making  The patient's presentation was of moderate complexity (an acute complicated injury).    The patient's evaluation involved:  ordering and/or review of 3+ test(s) in this encounter (see separate area of note for details)  independent interpretation of testing performed by another health professional (see separate area of note for details)    The patient's management necessitated high risk (a parenteral controlled  substance).    Assessment & Plan    Patient presents for the above complaints.  On my evaluation he is alert, cooperative, appears to be quite distressed and uncomfortable.  He has a sling and swath that was placed by first responders at the facility where he was at [place].  He does have tenderness to palpation and swelling along the anterior, lateral and superior shoulder.  Distal CMS is intact.    Certainly, need to consider the possibility of fracture or dislocation of the shoulder, alternatively clavicle fracture would be a possibility.  Also, need to consider rib fracture or pneumothorax.  He really does not have much in the way of abdominal tenderness.    We did establish IV access and we did draw blood for laboratory analysis.  I tend to believe that the syncopal episode was probably secondary to pain and/for related to rapid temperature changes from him going from the sauna to outside near the polar plunge location.  However certainly still need to consider cardiac causes.  EKG is WNL without ectopy or ischemia.    We did establish IV access and we did draw blood for laboratory analysis. Patient was given Dilaudid for pain.    CBC is within normal limits with a normal white count of 8.1, normal hemoglobin of 14, normal platelet count.  Comprehensive metabolic panel is also completely within normal limits, troponin is within normal limits at 6.  Right shoulder x-ray per my read shows no evidence of fracture or dislocation, radiology overread agrees that right glenohumeral and AC joints are negative for fracture or dislocation.  Chest x-ray also was read by radiology as negative.    Suspect that the patient has a simple contusion due to fall on the right forearm, likely transmitting the force of the energy up to the shoulder joint, potentially causing pain and contusion but I do not see any sign of fracture.  Patient had come in with a homemade sling and swath which he says is actually exacerbating his  symptoms.  I think it is reasonable to remove it.  I had a discussion with him about providing his sling however in the absence of fracture I do have some concern about causing more immobility which could potentially lead to frozen shoulder.  I will encourage him to do gentle range of motion exercises such as gravity circles or slowly climbing the wall with his hand to encourage range of motion of the shoulder, continue his home med regimen, he can also use ice.  If he is continuing to notice ongoing symptoms he certainly should follow-up with sports medicine and they can talk about physical therapy or more advanced imaging.  Patient verbalizes understanding.    I have reviewed the nursing notes. I have reviewed the findings, diagnosis, plan and need for follow up with the patient.    Discharge Medication List as of 2/16/2025  7:32 PM          Final diagnoses:   Syncope and collapse   Fall, initial encounter - mechanical fall on ice IN ADDITION to syncopal episode   Acute pain of right shoulder     Nguyen Leigh MD.  [facility] EMERGENCY DEPARTMENT  2/16/2025        Nguyen Leigh MD  02/16/25 [id number]

## 2025-02-17 ENCOUNTER — MYC MEDICAL ADVICE (OUTPATIENT)
Dept: FAMILY MEDICINE | Facility: CLINIC | Age: 50
End: 2025-02-17
Payer: COMMERCIAL

## 2025-02-17 NOTE — DISCHARGE INSTRUCTIONS
You have been seen in the emergency department today for your symptoms.  Your x-rays of both the chest and the shoulder do not show any sign of broken bones.  You likely have a contusion of the shoulder.  We recommend that you continue using ice and continue using her regular medications at home.  We advise that you do very gentle range of motion exercises to try to prevent frozen shoulder.  These range of motion exercises can include gravity circles like we explained to you as well as putting your hand flat against the wall and slowly crawling up the wall.  If you are continuing to notice ongoing symptoms after about 7 to 10 days, we advise that you follow-up with either your clinic or a sports medicine clinic to talk about further evaluation as they can subsequently recommend physical therapy or other imaging as appropriate.    Your EKG and blood work is normal.  We do not see any sign of heart problems at all.    Please follow-up as advised.

## 2025-02-19 ENCOUNTER — PRE VISIT (OUTPATIENT)
Dept: ORTHOPEDICS | Facility: CLINIC | Age: 50
End: 2025-02-19

## 2025-02-19 ENCOUNTER — OFFICE VISIT (OUTPATIENT)
Dept: ORTHOPEDICS | Facility: CLINIC | Age: 50
End: 2025-02-19
Payer: COMMERCIAL

## 2025-02-19 ENCOUNTER — ANCILLARY PROCEDURE (OUTPATIENT)
Dept: GENERAL RADIOLOGY | Facility: CLINIC | Age: 50
End: 2025-02-19
Attending: FAMILY MEDICINE
Payer: COMMERCIAL

## 2025-02-19 DIAGNOSIS — M25.811 IMPINGEMENT OF RIGHT SHOULDER: ICD-10-CM

## 2025-02-19 DIAGNOSIS — S22.42XA CLOSED FRACTURE OF MULTIPLE RIBS OF LEFT SIDE, INITIAL ENCOUNTER: Primary | ICD-10-CM

## 2025-02-19 DIAGNOSIS — S22.42XA CLOSED FRACTURE OF MULTIPLE RIBS OF LEFT SIDE, INITIAL ENCOUNTER: ICD-10-CM

## 2025-02-19 PROCEDURE — 99204 OFFICE O/P NEW MOD 45 MIN: CPT | Performed by: FAMILY MEDICINE

## 2025-02-19 PROCEDURE — 71101 X-RAY EXAM UNILAT RIBS/CHEST: CPT | Mod: LT | Performed by: RADIOLOGY

## 2025-02-19 RX ORDER — HYDROCODONE BITARTRATE AND ACETAMINOPHEN 5; 325 MG/1; MG/1
1 TABLET ORAL EVERY 6 HOURS PRN
Qty: 18 TABLET | Refills: 0 | Status: SHIPPED | OUTPATIENT
Start: 2025-02-19 | End: 2025-02-22

## 2025-02-19 NOTE — TELEPHONE ENCOUNTER
DIAGNOSIS: XR 2/16/25Right Shoulder Injury     APPOINTMENT DATE: 2.19.25   NOTES STATUS DETAILS   DISCHARGE REPORT from the ER Internal 2.16.25  Lu  [facility]   MEDICATION LIST Internal    XRAYS (IMAGES & REPORTS) Internal 2.16.25  XR Shoulder Right

## 2025-02-19 NOTE — LETTER
2/19/2025      RE: Jaun Lobo  [address]  [place] [address]     Dear Colleague,    Thank you for referring your patient, Jaun Lobo, to the [facility] [place]. Please see a copy of my visit note below.     Subjective:   Jaun Lobo is a 50 year old male who is here for right shoulder pain. Today, the patient reports that he had 2 falls, one slipping on ice and then second was a syncope episode following a polar bear plunge. The pain is located over the top of the shoulder. He is also having significant chest pain, which is currently more painful than the shoulder pain. He was seen in the ED on 2/6/25 following his injury. They obtained XR of both his chest and shoulder, which were negative for fracture.     Background:   Date of injury: 2/16/25   Duration of symptoms: 3 days  Mechanism of Injury: Acute; Activity Related fell after syncope episode  Intensity: 10/10 at rest, 10/10 with activity   Aggravating factors: Any motion of the shoulder   Relieving Factors: rest and ice  Prior Evaluation: Yes, ED 2/16/25          Exam:   General  - normal appearance, in no obvious distress    Pulm  - normal respiratory pattern, non-labored  Musculoskeletal - rib cage   - inspection: normal bone and joint alignment, no obvious scoliosis  - palpation: pain with palpation of left inferior lateral ribs  - ROM: normal and painless flexion, extension, left and right sidebending and rotation of thoracic spine  Neuro  - no sensory or motor deficit, grossly normal coordination, normal muscle tone  Skin  - no ecchymosis   thank you      Musculoskeletal - right shoulder  - inspection: normal bone and joint alignment  - palpation: mildly tender RC insertion, normal clavicle, non-tender AC  - ROM:  painful at 90 deg flexion, painful and limited in internal rotation  - strength: 5/5  strength, 5/5 in all shoulder planes  - special tests:  (-) Speed's  (+) Neer  (+) Hawkin's  (+)  Juanita's       Assessment/Plan:   Jaun is a 50-year-old gentleman here with pain in his left rib cage and right shoulder after a fall.    I ordered and independently reviewed an x-ray of his ribs.  This reveals no obvious displaced fracture.  Clinically he does have evidence of a rib fracture.    We discussed topical treatment with lidocaine patches, he does have tramadol from a previous back injury but very well may benefit from a short course of narcotics.  I am prescribing him Norco on a one-time basis.    With regards to his shoulder I am referring him to physical therapy.  I reviewed his x-ray in the room with him shows no obvious acute issues.  He very well may be suffering from an acute impingement episode, although it is difficult to rule out a higher degree of soft tissue injury I would expect some improvement with PT.  If he is not finding any relief with PT I would order an MRI.    It was a pleasure meeting Jaun.    Devon Martinez DO [facility]      Again, thank you for allowing me to participate in the care of your patient.      Sincerely,    Mega Martinez DO

## 2025-02-19 NOTE — PROGRESS NOTES
Subjective:   Jaun Lobo is a 50 year old male who is here for right shoulder pain. Today, the patient reports that he had 2 falls, one slipping on ice and then second was a syncope episode following a polar bear plunge. The pain is located over the top of the shoulder. He is also having significant chest pain, which is currently more painful than the shoulder pain. He was seen in the ED on 2/6/25 following his injury. They obtained XR of both his chest and shoulder, which were negative for fracture.     Background:   Date of injury: 2/16/25   Duration of symptoms: 3 days  Mechanism of Injury: Acute; Activity Related fell after syncope episode  Intensity: 10/10 at rest, 10/10 with activity   Aggravating factors: Any motion of the shoulder   Relieving Factors: rest and ice  Prior Evaluation: Yes, ED 2/16/25          Exam:   General  - normal appearance, in no obvious distress    Pulm  - normal respiratory pattern, non-labored  Musculoskeletal - rib cage   - inspection: normal bone and joint alignment, no obvious scoliosis  - palpation: pain with palpation of left inferior lateral ribs  - ROM: normal and painless flexion, extension, left and right sidebending and rotation of thoracic spine  Neuro  - no sensory or motor deficit, grossly normal coordination, normal muscle tone  Skin  - no ecchymosis   thank you      Musculoskeletal - right shoulder  - inspection: normal bone and joint alignment  - palpation: mildly tender RC insertion, normal clavicle, non-tender AC  - ROM:  painful at 90 deg flexion, painful and limited in internal rotation  - strength: 5/5  strength, 5/5 in all shoulder planes  - special tests:  (-) Speed's  (+) Neer  (+) Hawkin's  (+) Juanita's       Assessment/Plan:   Jaun is a 50-year-old gentleman here with pain in his left rib cage and right shoulder after a fall.    I ordered and independently reviewed an x-ray of his ribs.  This reveals no obvious displaced fracture.  Clinically he does  have evidence of a rib fracture.    We discussed topical treatment with lidocaine patches, he does have tramadol from a previous back injury but very well may benefit from a short course of narcotics.  I am prescribing him Norco on a one-time basis.    With regards to his shoulder I am referring him to physical therapy.  I reviewed his x-ray in the room with him shows no obvious acute issues.  He very well may be suffering from an acute impingement episode, although it is difficult to rule out a higher degree of soft tissue injury I would expect some improvement with PT.  If he is not finding any relief with PT I would order an MRI.    It was a pleasure meeting Jaun.    Devon Martinez DO [facility]

## 2025-02-20 NOTE — TELEPHONE ENCOUNTER
Writer replied to patient via [employer].  RONALDO Peralta BSN, PHN, AMB-BC (she/her)  [facility] RN

## 2025-02-24 ENCOUNTER — THERAPY VISIT (OUTPATIENT)
Dept: PHYSICAL THERAPY | Facility: REHABILITATION | Age: 50
End: 2025-02-24
Attending: FAMILY MEDICINE
Payer: COMMERCIAL

## 2025-02-24 DIAGNOSIS — M25.811 IMPINGEMENT OF RIGHT SHOULDER: ICD-10-CM

## 2025-02-24 PROCEDURE — 97161 PT EVAL LOW COMPLEX 20 MIN: CPT | Mod: GP

## 2025-02-24 PROCEDURE — 97110 THERAPEUTIC EXERCISES: CPT | Mod: GP

## 2025-02-24 ASSESSMENT — ACTIVITIES OF DAILY LIVING (ADL)
WASHING_YOUR_BACK: 5
PUTTING_ON_A_SHIRT_THAT_BUTTONS_DOWN_THE_FRONT: 3
WASHING_YOUR_HAIR?: 3
PUTTING_ON_AN_UNDERSHIRT_OR_A_PULLOVER_SWEATER: 5
REACHING_FOR_SOMETHING_ON_A_HIGH_SHELF: 5
PLEASE_INDICATE_YOR_PRIMARY_REASON_FOR_REFERRAL_TO_THERAPY:: SHOULDER
CARRYING_A_HEAVY_OBJECT_OF_10_POUNDS: 2
PUSHING_WITH_THE_INVOLVED_ARM: 5
TOUCHING_THE_BACK_OF_YOUR_NECK: 0
PUTTING_ON_YOUR_PANTS: 3
PLACING_AN_OBJECT_ON_A_HIGH_SHELF: 5
WHEN_LYING_ON_THE_INVOLVED_SIDE: 5
AT_ITS_WORST?: 6
REMOVING_SOMETHING_FROM_YOUR_BACK_POCKET: 1

## 2025-02-24 NOTE — PROGRESS NOTES
PHYSICAL THERAPY EVALUATION  Type of Visit: Evaluation       Fall Risk Screen:  Fall screen completed by: PT  Have you fallen 2 or more times in the past year?: Yes  Have you fallen and had an injury in the past year?: Yes  Is patient a fall risk?: No    Subjective         Presenting condition or subjective complaint: shoulder rehab  Date of onset: 02/19/25    Relevant medical history: Arthritis   Patient Active Problem List   Diagnosis    Allergic rhinitis    KEVIN (generalized anxiety disorder)    Headache    Ankylosing spondylitis (H)    DDD (degenerative disc disease), lumbar    Herpes simplex esophagitis    CARDIOVASCULAR SCREENING; LDL GOAL LESS THAN 160    Insomnia    GERD (gastroesophageal reflux disease)    Esophagitis    Alcohol dependence in remission (HCC)    Uncomplicated alcohol dependence (H)    History of esophageal ulcer    Intractable back pain    Mild intermittent asthma    Obsessive-compulsive disorder, unspecified type    Impingement of right shoulder       Dates & types of surgery:      Prior diagnostic imaging/testing results: X-ray     XRAY  IMPRESSION: The right glenohumeral and acromioclavicular joints are negative for fracture or dislocation.     IMPRESSION:   No visible rib fracture.  Prior therapy history for the same diagnosis, illness or injury: No      Prior Level of Function  IND    Living Environment  Social support: With a significant other or spouse   Type of home: House   Stairs to enter the home: Yes 3 Is there a railing: Yes     Ramp: No   Stairs inside the home: Yes 12 Is there a railing: Yes     Help at home:    Equipment owned: Raised toilet seat     Employment: Yes   Hobbies/Interests: Fly fishing, gardening, paddling    Patient goals for therapy: regain strength and flexibility in arm    Pain assessment: Pain present     Objective   SHOULDER EVALUATION  PAIN: Pain Level at Rest: 2/10  Pain Level with Use: 10/10  Pain Location: deep inside R shoulder joint   Pain  Quality: Aching, Dull, and cramping/spasms  Pain Frequency: intermittent or increased with activity   Pain is Worst: activity based  Pain is Exacerbated By: overhead motions, putting on jacket,   Pain is Relieved By: light stretches, tramadol, vicoden for sleeping   Pain Progression: Improved  INTEGUMENTARY (edema, incisions):   POSTURE:  rigid upright posture   GAIT:   Weightbearing Status: WBAT  Assistive Device(s): None  Gait Deviations:  decreased arm swing  BALANCE/PROPRIOCEPTION:   WEIGHTBEARING ALIGNMENT:   ROM:   (Degrees) Left AROM Left PROM Right AROM  Right PROM   Shoulder Flexion 160 deg  130 deg, pain at 120 and beyond 140 deg   Shoulder Extension       Shoulder Abduction 160 deg  130 deg, pain at 120 and beyond 140 deg   Shoulder Adduction To opposite shoulder   To opposite shoulder, painful end range     Shoulder Internal Rotation    Full range    Shoulder External Rotation    Full range    Shoulder Horizontal Abduction    WFL    Shoulder Horizontal Adduction    WFL    Shoulder Flexion ER To base of neck  To base of neck, painful    Shoulder Flexion IR T7  T12, painful    Elbow Extension WNL B      Elbow Flexion WNL B      Pain: Pain with all overhead   End feel: Pain with AROM, no pain end range with PROM     STRENGTH: WFL, and painful  FLEXIBILITY:  limited into overhead   SPECIAL TESTS:    Left Right   Impingement     Neer's Negative  Negative    Hawkin's-Percy Negative  Negative    Coracoid Impingement     Internal impingement     Labral     Anterior Slide     Laramie's     Crank     Instability     Apprehension (anterior) Negative  Negative    Relocation (anterior) Negative  Negative    Anterior Load & Shift     Posterior Load & Shift     Posterior instability (with 90 degrees flex)     Multi-Directional Instability      Sulcus     Biceps      Speed's     Rotator Cuff Tear     Drop Arm Negative  Negative    Belly Press Negative  Negative    Lift off  Negative  Negative      PALPATION:  TTP  through R deltoid   JOINT MOBILITY: WFL  CERVICAL SCREEN: WFL  Tightness in neck limited to spinal changes, no shoulder pain reproduced    Assessment & Plan   CLINICAL IMPRESSIONS  Medical Diagnosis: M25.811 (ICD-10-CM) - Impingement of right shoulder    Treatment Diagnosis: R shoulder pain   Impression/Assessment: Patient is a 50 year old male with R shoulder pain complaints.  The following significant findings have been identified: Pain, Decreased ROM/flexibility, Decreased joint mobility, Decreased strength, Impaired balance, Decreased proprioception, Impaired sensation, Inflammation, Impaired muscle performance, Decreased activity tolerance, and Impaired posture. These impairments interfere with their ability to perform self care tasks, work tasks, recreational activities, household chores, driving , household mobility, and community mobility as compared to previous level of function. Pt presents with R shoulder pain following a fall directly on shoulder with polar pulnge. Upon eval pt demonstrating good strength and limited overhead ROM making ADLs, lifting and reaching difficult. MMT pain with shoulder flexion and PROM pain with ER suggesticve of irritation with suprapsinatus and subscapularis RTC muscles. Pt would benefit from skilled PT to improve function and progressive strength     Clinical Decision Making (Complexity):  Clinical Presentation: Stable/Uncomplicated  Clinical Presentation Rationale: based on medical and personal factors listed in PT evaluation  Clinical Decision Making (Complexity): Low complexity    PLAN OF CARE  Treatment Interventions:  Modalities: Ultrasound  Interventions: Gait Training, Manual Therapy, Neuromuscular Re-education, Therapeutic Activity, Therapeutic Exercise, Self-Care/Home Management    Long Term Goals     PT Goal 1  Goal Identifier: HEP  Goal Description: Patient will be independent in self-management of condition and HEP to reach functional goals.  Target Date:  05/19/25  PT Goal 2  Goal Identifier: AROM/reaching  Goal Description: Pt will be able to reach overhead 120+ degrees with pain level <3/10 for improved ability to perform ADLs and don/dof jacket  Target Date: 05/19/25  PT Goal 3  Goal Identifier: Lifting  Goal Description: Pt will be able to lift 5+ lbs without pain for carrying groceries  Target Date: 05/19/25  PT Goal 4  Goal Identifier: Sleeping  Goal Description: Pt will be able to sleep through the night with pain level <2/10 for improved rest and QOL  Target Date: 05/19/25      Frequency of Treatment: 1x/wk  Duration of Treatment: 12 weeks    Recommended Referrals to Other Professionals:   Education Assessment:   Learner/Method: Patient  Education Comments: Verbalizes understanding    Risks and benefits of evaluation/treatment have been explained.   Patient/Family/caregiver agrees with Plan of Care.     Evaluation Time:     PT Eval, Low Complexity Minutes (69982): 30       Signing Clinician: ELISSA PAREDES, PT

## 2025-03-04 ENCOUNTER — THERAPY VISIT (OUTPATIENT)
Dept: PHYSICAL THERAPY | Facility: REHABILITATION | Age: 50
End: 2025-03-04
Payer: COMMERCIAL

## 2025-03-04 DIAGNOSIS — M25.811 IMPINGEMENT OF RIGHT SHOULDER: Primary | ICD-10-CM

## 2025-03-11 ENCOUNTER — THERAPY VISIT (OUTPATIENT)
Dept: PHYSICAL THERAPY | Facility: REHABILITATION | Age: 50
End: 2025-03-11
Payer: COMMERCIAL

## 2025-03-11 DIAGNOSIS — M25.811 IMPINGEMENT OF RIGHT SHOULDER: Primary | ICD-10-CM

## 2025-03-13 ENCOUNTER — OFFICE VISIT (OUTPATIENT)
Dept: FAMILY MEDICINE | Facility: CLINIC | Age: 50
End: 2025-03-13
Attending: FAMILY MEDICINE
Payer: COMMERCIAL

## 2025-03-13 VITALS
BODY MASS INDEX: 25.05 KG/M2 | HEART RATE: 77 BPM | OXYGEN SATURATION: 98 % | HEIGHT: 73 IN | SYSTOLIC BLOOD PRESSURE: 118 MMHG | DIASTOLIC BLOOD PRESSURE: 78 MMHG | WEIGHT: 189 LBS | TEMPERATURE: 97.3 F | RESPIRATION RATE: 16 BRPM

## 2025-03-13 DIAGNOSIS — F41.1 GAD (GENERALIZED ANXIETY DISORDER): ICD-10-CM

## 2025-03-13 DIAGNOSIS — J45.20 MILD INTERMITTENT ASTHMA WITHOUT COMPLICATION: ICD-10-CM

## 2025-03-13 DIAGNOSIS — N50.89 TESTICLE LUMP: ICD-10-CM

## 2025-03-13 DIAGNOSIS — E78.5 HYPERLIPIDEMIA LDL GOAL <100: ICD-10-CM

## 2025-03-13 DIAGNOSIS — Z00.00 ROUTINE GENERAL MEDICAL EXAMINATION AT A HEALTH CARE FACILITY: Primary | ICD-10-CM

## 2025-03-13 DIAGNOSIS — G47.00 INSOMNIA, UNSPECIFIED TYPE: ICD-10-CM

## 2025-03-13 DIAGNOSIS — F42.9 OBSESSIVE-COMPULSIVE DISORDER, UNSPECIFIED TYPE: ICD-10-CM

## 2025-03-13 LAB
CHOLEST SERPL-MCNC: 172 MG/DL
FASTING STATUS PATIENT QL REPORTED: YES
HDLC SERPL-MCNC: 53 MG/DL
LDLC SERPL CALC-MCNC: 106 MG/DL
NONHDLC SERPL-MCNC: 119 MG/DL
TRIGL SERPL-MCNC: 63 MG/DL

## 2025-03-13 RX ORDER — TEMAZEPAM 7.5 MG/1
7.5 CAPSULE ORAL
Qty: 100 CAPSULE | Refills: 0 | Status: SHIPPED | OUTPATIENT
Start: 2025-03-13

## 2025-03-13 RX ORDER — MONTELUKAST SODIUM 10 MG/1
10 TABLET ORAL AT BEDTIME
Qty: 90 TABLET | Refills: 3 | Status: SHIPPED | OUTPATIENT
Start: 2025-03-13

## 2025-03-13 SDOH — HEALTH STABILITY: PHYSICAL HEALTH: ON AVERAGE, HOW MANY DAYS PER WEEK DO YOU ENGAGE IN MODERATE TO STRENUOUS EXERCISE (LIKE A BRISK WALK)?: 5 DAYS

## 2025-03-13 ASSESSMENT — PATIENT HEALTH QUESTIONNAIRE - PHQ9
SUM OF ALL RESPONSES TO PHQ QUESTIONS 1-9: 5
5. POOR APPETITE OR OVEREATING: NOT AT ALL

## 2025-03-13 ASSESSMENT — ASTHMA QUESTIONNAIRES
QUESTION_1 LAST FOUR WEEKS HOW MUCH OF THE TIME DID YOUR ASTHMA KEEP YOU FROM GETTING AS MUCH DONE AT WORK, SCHOOL OR AT HOME: NONE OF THE TIME
ACT_TOTALSCORE: 21
QUESTION_2 LAST FOUR WEEKS HOW OFTEN HAVE YOU HAD SHORTNESS OF BREATH: ONCE OR TWICE A WEEK
QUESTION_5 LAST FOUR WEEKS HOW WOULD YOU RATE YOUR ASTHMA CONTROL: WELL CONTROLLED
QUESTION_4 LAST FOUR WEEKS HOW OFTEN HAVE YOU USED YOUR RESCUE INHALER OR NEBULIZER MEDICATION (SUCH AS ALBUTEROL): ONCE A WEEK OR LESS
ACT_TOTALSCORE: 21
QUESTION_3 LAST FOUR WEEKS HOW OFTEN DID YOUR ASTHMA SYMPTOMS (WHEEZING, COUGHING, SHORTNESS OF BREATH, CHEST TIGHTNESS OR PAIN) WAKE YOU UP AT NIGHT OR EARLIER THAN USUAL IN THE MORNING: ONCE OR TWICE

## 2025-03-13 ASSESSMENT — ANXIETY QUESTIONNAIRES
3. WORRYING TOO MUCH ABOUT DIFFERENT THINGS: SEVERAL DAYS
IF YOU CHECKED OFF ANY PROBLEMS ON THIS QUESTIONNAIRE, HOW DIFFICULT HAVE THESE PROBLEMS MADE IT FOR YOU TO DO YOUR WORK, TAKE CARE OF THINGS AT HOME, OR GET ALONG WITH OTHER PEOPLE: SOMEWHAT DIFFICULT
GAD7 TOTAL SCORE: 8
GAD7 TOTAL SCORE: 8
7. FEELING AFRAID AS IF SOMETHING AWFUL MIGHT HAPPEN: SEVERAL DAYS
1. FEELING NERVOUS, ANXIOUS, OR ON EDGE: NEARLY EVERY DAY
6. BECOMING EASILY ANNOYED OR IRRITABLE: MORE THAN HALF THE DAYS
2. NOT BEING ABLE TO STOP OR CONTROL WORRYING: SEVERAL DAYS
5. BEING SO RESTLESS THAT IT IS HARD TO SIT STILL: NOT AT ALL

## 2025-03-13 ASSESSMENT — SOCIAL DETERMINANTS OF HEALTH (SDOH): HOW OFTEN DO YOU GET TOGETHER WITH FRIENDS OR RELATIVES?: ONCE A WEEK

## 2025-03-13 NOTE — PROGRESS NOTES
Preventive Care Visit  [facility]  Harleen Ny MD, Family Medicine  Mar 13, 2025      Assessment & Plan     Routine general medical examination at a health care facility  - COVID-19 12+ (PFIZER)  - PRIMARY CARE FOLLOW-UP SCHEDULING; Future    Testicle lump  - Declined exam and requested below referral   - Adult Urology  Referral; Future    Mild intermittent asthma without complication      3/3/2023    10:49 AM 3/13/2024     1:14 PM 3/13/2025    10:18 AM   ACT Total Scores   ACT TOTAL SCORE (Goal Greater than or Equal to 20) 25 25 21    In the past 12 months, how many times did you visit the emergency room for your asthma without being admitted to the hospital? 0  0 0   In the past 12 months, how many times were you hospitalized overnight because of your asthma? 0  0 0       Patient-reported    Proxy-reported    - stable, refill below   - montelukast (SINGULAIR) 10 MG tablet; Take 1 tablet (10 mg) by mouth at bedtime.    KEVIN (generalized anxiety disorder)  - Adult Mental Health  Referral; Future    Obsessive-compulsive disorder, unspecified type  - Adult Mental Health  Referral; Future    Insomnia, unspecified type  - C[place] updated   - [place]   - temazepam (RESTORIL) 7.5 MG capsule; Take 1 capsule (7.5 mg) by mouth nightly as needed for sleep. One year script 3//13/2026    Hyperlipidemia LDL goal <100  - Lipid panel reflex to direct LDL Non-fasting; Future            Counseling  Appropriate preventive services were addressed with this patient via screening, questionnaire, or discussion as appropriate for fall prevention, nutrition, physical activity, Tobacco-use cessation, social engagement, weight loss and cognition.  Checklist reviewing preventive services available has been given to the patient.  Reviewed patient's diet, addressing concerns and/or questions.   He is at risk for psychosocial distress and has been provided with information to  reduce risk.   The patient's PHQ-9 score is consistent with mild depression. He was provided with information regarding depression.           Dayanna Zamorano is a 50 year old, presenting for the following:  Physical        3/13/2025    10:24 AM   Additional Questions   Roomed by daniela   Accompanied by self          HPI    Lump on the right testicle which he noticed around 2-3 weeks ago.     He stopped taking lexapro due to sleepiness. When he started taking lexapro 10 mg, he felt very wired. He would like to restart wellbutrin which he took ~ 20 years ago. He would like to try wellbutrin for anxiety, agitation and sleepiness. He has had bad experiences with other medications including paxil and prozac.    Advance Care Planning  Patient does not have a Health Care Directive: Patient states has Advance Directive and will bring in a copy to clinic.      3/13/2025   General Health   How would you rate your overall physical health? (!) FAIR   Feel stress (tense, anxious, or unable to sleep) Rather much   (!) STRESS CONCERN      3/13/2025   Nutrition   Three or more servings of calcium each day? Yes   Diet: Regular (no restrictions)    Low fat/cholesterol   How many servings of fruit and vegetables per day? 4 or more   How many sweetened beverages each day? 0-1       Multiple values from one day are sorted in reverse-chronological order         3/13/2025   Exercise   Days per week of moderate/strenous exercise 5 days         3/13/2025   Social Factors   Frequency of gathering with friends or relatives Once a week   Worry food won't last until get money to buy more No   Food not last or not have enough money for food? No   Do you have housing? (Housing is defined as stable permanent housing and does not include staying ouside in a car, in a tent, in an abandoned building, in an overnight shelter, or couch-surfing.) Yes   Are you worried about losing your housing? No   Lack of transportation? No   Unable to get utilities  "(heat,electricity)? No         3/13/2025   Fall Risk   Fallen 2 or more times in the past year? Yes   Trouble with walking or balance? No   Gait Speed Test (Document in seconds) 2   Gait Speed Test Interpretation Less than or equal to 5.00 seconds - PASS          3/13/2025   Dental   Dentist two times every year? Yes           3/13/2024   TB Screening   Were you born outside of the [place]? No           Today's PHQ-2 Score:       3/13/2025    10:17 AM   PHQ-2 ( 1999 [employer])   Q1: Little interest or pleasure in doing things 0   Q2: Feeling down, depressed or hopeless 0   PHQ-2 Score 0    Q1: Little interest or pleasure in doing things Not at all   Q2: Feeling down, depressed or hopeless Not at all   PHQ-2 Score 0       Patient-reported           3/13/2025   Substance Use   Alcohol more than 3/day or more than 7/wk Not Applicable   Do you use any other substances recreationally? No     Social History     Tobacco Use    Smoking status: Former     Passive exposure: Never    Smokeless tobacco: Never   Vaping Use    Vaping status: Never Used   Substance Use Topics    Alcohol use: No     Alcohol/week: 16.7 standard drinks of alcohol     Types: 20 Cans of beer per week     Comment: 40 drinks a week , quit drinking 6 weeks ago     Drug use: No           3/13/2025   STI Screening   New sexual partner(s) since last STI/HIV test? No   ASCVD Risk   The 10-year ASCVD risk score (Donavon TREVIÑO, et al., 2019) is: 2.5%    Values used to calculate the score:      Age: 50 years      Sex: Male      Is Non- : No      Diabetic: No      Tobacco smoker: No      Systolic Blood Pressure: 118 mmHg      Is BP treated: No      HDL Cholesterol: 52 mg/dL      Total Cholesterol: 168 mg/dL           Reviewed and updated as needed this visit by Provider                             Objective    Exam  /78   Pulse 77   Temp 97.3  F (36.3  C) (Temporal)   Resp 16   Ht 1.86 m (6' 1.23\")   Wt 85.7 kg (189 lb)   SpO2 98% " "  BMI 24.78 kg/m     Estimated body mass index is 24.78 kg/m  as calculated from the following:    Height as of this encounter: 1.86 m (6' 1.23\").    Weight as of this encounter: 85.7 kg (189 lb).    Physical Exam          Signed Electronically by: Harleen Ny MD    "

## 2025-03-13 NOTE — LETTER
[facility]  03/13/25  Patient: Jaun Lobo  YOB: 1975  Medical Record Number: [id number]                                                                                  Non-Opioid Controlled Substance Agreement    This is an agreement between you and your provider regarding safe and appropriate use of controlled substances prescribed by your care team. Controlled substances are?medicines that can cause physical and mental dependence (abuse).     There are strict laws about having and using these medicines. We here at [facility] are  committed to working with you in your efforts to get better. To support you in this work, we'll help you schedule regular office appointments for medicine refills. If we must cancel or change your appointment for any reason, we'll make sure you have enough medicine to last until your next appointment.     As a Provider, I will:   Listen carefully to your concerns while treating you with respect.   Recommend a treatment plan that I believe is in your best interest and may involve therapies other than medicine.    Talk with you often about the possible benefits and the risk of harm of any medicine that we prescribe for you.  Assess the safety of this medicine and check how well it works.    Provide a plan on how to taper (discontinue or go off) using this medicine if the decision is made to stop its use.      ::  As a Patient, I understand controlled substances:     Are prescribed by my care provider to help me function or work and manage my condition(s).?  Are strong medicines and can cause serious side effects.     Need to be taken exactly as prescribed.?Combining controlled substances with certain medicines or chemicals (such as illegal drugs, alcohol, sedatives, sleeping pills, and benzodiazepines) can be dangerous or even fatal.? If I stop taking my medicines suddenly, I may have severe withdrawal symptoms.     The risks, benefits,  and side effects of these medicine(s) were explained to me. I agree that:    I will take part in other treatments as advised by my care team. This may be psychiatry or counseling, physical therapy, behavioral therapy, group treatment or a referral to specialist.    I will keep all my appointments and understand this is part of the monitoring of controlled substances.?My care team may require an office visit for EVERY controlled substance refill. If I miss appointments or don t follow instructions, my care team may stop my medicine    I will take my medicines as prescribed. I will not change the dose or schedule unless my care team tells me to. There will be no refills if I run out early.      I may be asked to come to the clinic and complete a urine drug test or complete a pill count. If I don t give a urine sample or participate in a pill count, the care team may stop my medicine.    I will only receive controlled substance prescriptions from this clinic. If I am treated by another provider, I will tell them that I am taking controlled substances and that I have a treatment agreement with this provider. I will inform my [facility] care team within one business day if I am given a prescription for any controlled substance by another healthcare provider. My [facility] care team can contact other providers and pharmacists about my use of any medicines.    It is up to me to make sure that I don't run out of my medicines on weekends or holidays.?If my care team is willing to refill my prescription without a visit, I must request refills only during office hours. Refills may take up to 3 business days to process. I will use one pharmacy to fill all my controlled substance prescriptions. I will notify the clinic about any changes to my insurance or medicine availability.    I am responsible for my prescriptions. If the medicine/prescription is lost, stolen or destroyed, it will not be replaced.?I also agree  not to share controlled substance medicines with anyone.     I am aware I should not use any illegal or recreational drugs. I agree not to drink alcohol unless my care team says I can.     If I enroll in the [place] [facility] Cannabis program, I will tell my care team before my next refill.    I will tell my care team right away if I become pregnant, have a new medical problem treated outside of my regular clinic, or have a change in my medicines.     I understand that this medicine can affect my thinking, judgment and reaction time.? Alcohol and drugs affect the brain and body, which can affect the safety of my driving. Being under the influence of alcohol or drugs can affect my decision-making, behaviors, personal safety and the safety of others. Driving while impaired (DWI) can occur if a person is driving, operating or in physical control of a car, motorcycle, boat, snowmobile, ATV, motorbike, off-road vehicle or any other motor vehicle ([place] Statute 169[place].20). I understand the risk if I choose to drive or operate any vehicle or machinery.    I understand that if I do not follow any of the conditions above, my prescriptions or treatment may be stopped or changed.   I agree that my provider, clinic care team and pharmacy may work with any city, state or federal law enforcement agency that investigates the misuse, sale or other diversion of my controlled medicine. I will allow my provider to discuss my care with, or share a copy of, this agreement with any other treating provider, pharmacy or emergency room where I receive care.     I have read this agreement and have asked questions about anything I did not understand.    ________________________________________________________  Patient Signature - Jaun Lobo     ___________________                   Date     ________________________________________________________  Provider Signature - Harleen Ny MD       ___________________                   Date      ________________________________________________________  Witness Signature (required if provider not present while patient signing)          ___________________                   Date

## 2025-03-13 NOTE — PATIENT INSTRUCTIONS
Patient Education   Preventive Care Advice   This is general advice given by our system to help you stay healthy. However, your care team may have specific advice just for you. Please talk to your care team about your preventive care needs.  Nutrition  Eat 5 or more servings of fruits and vegetables each day.  Try wheat bread, brown rice and whole grain pasta (instead of white bread, rice, and pasta).  Get enough calcium and vitamin D. Check the label on foods and aim for 100% of the RDA (recommended daily allowance).  Lifestyle  Exercise at least 150 minutes each week  (30 minutes a day, 5 days a week).  Do muscle strengthening activities 2 days a week. These help control your weight and prevent disease.  No smoking.  Wear sunscreen to prevent skin cancer.  Have a dental exam and cleaning every 6 months.  Yearly exams  See your health care team every year to talk about:  Any changes in your health.  Any medicines your care team has prescribed.  Preventive care, family planning, and ways to prevent chronic diseases.  Shots (vaccines)   HPV shots (up to age 26), if you've never had them before.  Hepatitis B shots (up to age 59), if you've never had them before.  COVID-19 shot: Get this shot when it's due.  Flu shot: Get a flu shot every year.  Tetanus shot: Get a tetanus shot every 10 years.  Pneumococcal, hepatitis A, and RSV shots: Ask your care team if you need these based on your risk.  Shingles shot (for age 50 and up)  General health tests  Diabetes screening:  Starting at age 35, Get screened for diabetes at least every 3 years.  If you are younger than age 35, ask your care team if you should be screened for diabetes.  Cholesterol test: At age 39, start having a cholesterol test every 5 years, or more often if advised.  Bone density scan (DEXA): At age 50, ask your care team if you should have this scan for osteoporosis (brittle bones).  Hepatitis C: Get tested at least once in your life.  STIs (sexually  transmitted infections)  Before age 24: Ask your care team if you should be screened for STIs.  After age 24: Get screened for STIs if you're at risk. You are at risk for STIs (including HIV) if:  You are sexually active with more than one person.  You don't use condoms every time.  You or a partner was diagnosed with a sexually transmitted infection.  If you are at risk for HIV, ask about PrEP medicine to prevent HIV.  Get tested for HIV at least once in your life, whether you are at risk for HIV or not.  Cancer screening tests  Cervical cancer screening: If you have a cervix, begin getting regular cervical cancer screening tests starting at age 21.  Breast cancer scan (mammogram): If you've ever had breasts, begin having regular mammograms starting at age 40. This is a scan to check for breast cancer.  Colon cancer screening: It is important to start screening for colon cancer at age 45.  Have a colonoscopy test every 10 years (or more often if you're at risk) Or, ask your provider about stool tests like a FIT test every year or Cologuard test every 3 years.  To learn more about your testing options, visit:   .  For help making a decision, visit:   https://bit.ly/mq34448.  Prostate cancer screening test: If you have a prostate, ask your care team if a prostate cancer screening test (PSA) at age 55 is right for you.  Lung cancer screening: If you are a current or former smoker ages 50 to 80, ask your care team if ongoing lung cancer screenings are right for you.  For informational purposes only. Not to replace the advice of your health care provider. Copyright   2023 [facility]. All rights reserved. Clinically reviewed by the [facility]. [employer] [id number] - REV 01/24.  Preventing Falls: Care Instructions  Injuries and health problems such as trouble walking or poor eyesight can increase your risk of falling. So can some medicines. But there are things you can do to help  "prevent falls. You can exercise to get stronger. You can also arrange your home to make it safer.    Talk to your doctor about the medicines you take. Ask if any of them increase the risk of falls and whether they can be changed or stopped.   Try to exercise regularly. It can help improve your strength and balance. This can help lower your risk of falling.         Practice fall safety and prevention.   Wear low-heeled shoes that fit well and give your feet good support. Talk to your doctor if you have foot problems that make this hard.  Carry a cellphone or wear a medical alert device that you can use to call for help.  Use stepladders instead of chairs to reach high objects. Don't climb if you're at risk for falls. Ask for help, if needed.  Wear the correct eyeglasses, if you need them.        Make your home safer.   Remove rugs, cords, clutter, and furniture from walkways.  Keep your house well lit. Use night-lights in hallways and bathrooms.  Install and use sturdy handrails on stairways.  Wear nonskid footwear, even inside. Don't walk barefoot or in socks without shoes.        Be safe outside.   Use handrails, curb cuts, and ramps whenever possible.  Keep your hands free by using a shoulder bag or backpack.  Try to walk in well-lit areas. Watch out for uneven ground, changes in pavement, and debris.  Be careful in the winter. Walk on the grass or gravel when sidewalks are slippery. Use de-icer on steps and walkways. Add non-slip devices to shoes.    Put grab bars and nonskid mats in your shower or tub and near the toilet. Try to use a shower chair or bath bench when bathing.   Get into a tub or shower by putting in your weaker leg first. Get out with your strong side first. Have a phone or medical alert device in the bathroom with you.   Where can you learn more?  Go to https://www.uMix.TVwise.net/patiented  Enter G117 in the search box to learn more about \"Preventing Falls: Care Instructions.\"  Current as of: " July 31, 2024  Content Version: 14.3    2024 [employer].   Care instructions adapted under license by your healthcare professional. If you have questions about a medical condition or this instruction, always ask your healthcare professional. [employer] disclaims any warranty or liability for your use of this information.    Learning About Stress  What is stress?     Stress is your body's response to a hard situation. Your body can have a physical, emotional, or mental response. Stress is a fact of life for most people, and it affects everyone differently. What causes stress for you may not be stressful for someone else.  A lot of things can cause stress. You may feel stress when you go on a job interview, take a test, or run a race. This kind of short-term stress is normal and even useful. It can help you if you need to work hard or react quickly. For example, stress can help you finish an important job on time.  Long-term stress is caused by ongoing stressful situations or events. Examples of long-term stress include long-term health problems, ongoing problems at work, or conflicts in your family. Long-term stress can harm your health.  How does stress affect your health?  When you are stressed, your body responds as though you are in danger. It makes hormones that speed up your heart, make you breathe faster, and give you a burst of energy. This is called the fight-or-flight stress response. If the stress is over quickly, your body goes back to normal and no harm is done.  But if stress happens too often or lasts too long, it can have bad effects. Long-term stress can make you more likely to get sick, and it can make symptoms of some diseases worse. If you tense up when you are stressed, you may develop neck, shoulder, or low back pain. Stress is linked to high blood pressure and heart disease.  Stress also harms your emotional health. It can make you bucio, tense, or depressed. Your  relationships may suffer, and you may not do well at work or school.  What can you do to manage stress?  You can try these things to help manage stress:   Do something active. Exercise or activity can help reduce stress. Walking is a great way to get started. Even everyday activities such as housecleaning or yard work can help.  Try yoga or marbella chi. These techniques combine exercise and meditation. You may need some training at first to learn them.  Do something you enjoy. For example, listen to music or go to a movie. Practice your hobby or do volunteer work.  Meditate. This can help you relax, because you are not worrying about what happened before or what may happen in the future.  Do guided imagery. Imagine yourself in any setting that helps you feel calm. You can use online videos, books, or a teacher to guide you.  Do breathing exercises. For example:  From a standing position, bend forward from the waist with your knees slightly bent. Let your arms dangle close to the floor.  Breathe in slowly and deeply as you return to a standing position. Roll up slowly and lift your head last.  Hold your breath for just a few seconds in the standing position.  Breathe out slowly and bend forward from the waist.  Let your feelings out. Talk, laugh, cry, and express anger when you need to. Talking with supportive friends or family, a counselor, or a bob leader about your feelings is a healthy way to relieve stress. Avoid discussing your feelings with people who make you feel worse.  Write. It may help to write about things that are bothering you. This helps you find out how much stress you feel and what is causing it. When you know this, you can find better ways to cope.  What can you do to prevent stress?  You might try some of these things to help prevent stress:  Manage your time. This helps you find time to do the things you want and need to do.  Get enough sleep. Your body recovers from the stresses of the day while  "you are sleeping.  Get support. Your family, friends, and community can make a difference in how you experience stress.  Limit your news feed. Avoid or limit time on social media or news that may make you feel stressed.  Do something active. Exercise or activity can help reduce stress. Walking is a great way to get started.  Where can you learn more?  Go to https://www.BrieFix.net/patiented  Enter N032 in the search box to learn more about \"Learning About Stress.\"  Current as of: October 24, 2023  Content Version: 14.3    2024 [employer].   Care instructions adapted under license by your healthcare professional. If you have questions about a medical condition or this instruction, always ask your healthcare professional. [employer] disclaims any warranty or liability for your use of this information.       "

## 2025-03-18 ENCOUNTER — THERAPY VISIT (OUTPATIENT)
Dept: PHYSICAL THERAPY | Facility: REHABILITATION | Age: 50
End: 2025-03-18
Payer: COMMERCIAL

## 2025-03-18 DIAGNOSIS — M25.811 IMPINGEMENT OF RIGHT SHOULDER: Primary | ICD-10-CM

## 2025-03-18 PROCEDURE — 97110 THERAPEUTIC EXERCISES: CPT | Mod: GP | Performed by: PHYSICAL THERAPIST

## 2025-04-15 ENCOUNTER — THERAPY VISIT (OUTPATIENT)
Dept: PHYSICAL THERAPY | Facility: REHABILITATION | Age: 50
End: 2025-04-15
Payer: COMMERCIAL

## 2025-04-15 DIAGNOSIS — M25.811 IMPINGEMENT OF RIGHT SHOULDER: Primary | ICD-10-CM

## 2025-04-15 PROCEDURE — 97110 THERAPEUTIC EXERCISES: CPT | Mod: GP | Performed by: PHYSICAL THERAPIST

## 2025-04-15 PROCEDURE — 97535 SELF CARE MNGMENT TRAINING: CPT | Mod: GP | Performed by: PHYSICAL THERAPIST

## 2025-04-17 ENCOUNTER — TELEPHONE (OUTPATIENT)
Dept: UROLOGY | Facility: CLINIC | Age: 50
End: 2025-04-17
Payer: COMMERCIAL

## 2025-04-17 NOTE — TELEPHONE ENCOUNTER
[facility] Call Center    Phone Message    May a detailed message be left on voicemail: yes     Reason for Call: Pt has seen Dr. Graff before  Testicle lump [N50.89]. Pt wants to get it as soon as possible. Please call pt back. Thanks.    Action Taken: [facility]     Travel Screening: Not Applicable     Date of Service:

## 2025-04-17 NOTE — TELEPHONE ENCOUNTER
4/17 left generic voicemail for patient to return call to clinic at [phone], also informed patient on voicemail that a [employer] message will be sent as well.     If patient returns call to clinic can schedule a return appointment with  on 5/1/25 at 1:15 pm in [place].     Amanda Hendrix MA on 4/17/2025 at 11:08 AM

## 2025-05-01 ENCOUNTER — OFFICE VISIT (OUTPATIENT)
Dept: UROLOGY | Facility: CLINIC | Age: 50
End: 2025-05-01
Payer: COMMERCIAL

## 2025-05-01 DIAGNOSIS — Z98.52 S/P VASECTOMY: Primary | ICD-10-CM

## 2025-05-01 NOTE — PROGRESS NOTES
[facility]   CHIEF COMPLAINT   It was my pleasure to see Jaun Lobo who is a 50 year old male for follow-up of s/p vasectomy.      HPI   Jaun Lobo is a very pleasant 50 year old male    S/P vasectomy 4/2/2024    Follow-up today due to concern for small lumps that he felt in the right and left hemiscrotum  He noted these about 2 to 3 months ago  No notable change in size since that time  These are separate from the testicle and higher up cord  No pain or tenderness    PHYSICAL EXAM  Patient is a 50 year old  male   Vitals: There were no vitals taken for this visit.  There is no height or weight on file to calculate BMI.  General Appearance Adult:   Alert, no acute distress, oriented  HENT: throat/mouth:normal, good dentition  Lungs: no respiratory distress, or pursed lip breathing  Heart: No obvious jugular venous distension present  Neuro: Alert, oriented, speech and mentation normal  Psych: affect and mood normal  Gait: Normal  : Normal phallus, normal testis bilaterally, very small 5 mm palpable nodule up in the right hemiscrotum at the level of his vasectomy and a smaller 2 to 3 mm nodule in a similar position on the left side.  These are nontender on exam     Sperm Count Semen   6/28/2024 No Sperm Seen        ASSESSMENT and PLAN  50-year-old man who is status post a vasectomy    Palpable scrotal nodules  - We discussed that these nodules up the cord at the level of the vasectomy are most certainly a benign process and not an indication or cause for concern for any possible testicular cancer  - We discussed that these either represent a scarred and healed end of a vas deferens or potentially a small sperm granuloma, but that given that these are asymptomatic and very small, no further workup or follow-up is needed at this time  - Again reviewed his postvasectomy semen analysis  - He may follow-up with me on a as needed basis      Time spent: 12 minutes spent on the date of the encounter doing chart  review, history and exam, documentation and further activities as noted above.    Keanu Graff MD   Urology  [facility]  [facility] Phone: [phone]  [facility] Phone: [phone]

## 2025-05-01 NOTE — NURSING NOTE
Jaun Lobo's goals for this visit include:   Chief Complaint   Patient presents with    RECHECK     testicular lump, per pt, self referred, records in [facility], vas 4/2/24       He requests these members of his care team be copied on today's visit information:     PCP: Harleen Ny Y    Referring Provider:  Referred Self, MD  No address on file    There were no vitals taken for this visit.    Do you need any medication refills at today's visit?     Sheba Snow on 5/1/2025 at 1:08 PM

## 2025-05-05 ENCOUNTER — TELEPHONE (OUTPATIENT)
Dept: DERMATOLOGY | Facility: CLINIC | Age: 50
End: 2025-05-05
Payer: COMMERCIAL

## 2025-05-05 NOTE — TELEPHONE ENCOUNTER
S/w pt who states he has a lesion on the left shoulder that has appeared in the past 1-2 weeks.  Spot is quarter size with irregular border and mult colored.  Denies pain or itching.    Scheduled with Priscila López on Thursday 5/8 at 12:30 pm at  [facility].    Amparo DALTON RN  [facility]  [phone]

## 2025-05-05 NOTE — TELEPHONE ENCOUNTER
[facility] Call Center    Phone Message    May a detailed message be left on voicemail: yes     Reason for Call: Appointment Intake    Referring Provider Name: SELF    Diagnosis and/or Symptoms: Rapidly changing, Odd shape, Non healing skin lesion.    States he's had skin lesions before and knows what's normal and what's not. He states this is very abnormal, changed from small spot to size of quarter on shoulder.    He is very distressed!    Routing to clinic per protocols. Patient willing to go anywhere near him. He can do Thurs/Friday this week and anytime next week)    Action Taken: Message routed to:  Other:  Skin Nurse Pool    Travel Screening: Not Applicable     Current Date of Service: 7/14/2025 with Priscila López, BRAD